# Patient Record
Sex: FEMALE | Race: WHITE | NOT HISPANIC OR LATINO | Employment: OTHER | URBAN - METROPOLITAN AREA
[De-identification: names, ages, dates, MRNs, and addresses within clinical notes are randomized per-mention and may not be internally consistent; named-entity substitution may affect disease eponyms.]

---

## 2017-03-30 ENCOUNTER — AMBULATORY - HEALTHEAST (OUTPATIENT)
Dept: LAB | Facility: CLINIC | Age: 42
End: 2017-03-30

## 2017-03-30 DIAGNOSIS — R53.83 FATIGUE: ICD-10-CM

## 2017-03-30 DIAGNOSIS — E55.9 VITAMIN D DEFICIENCY: ICD-10-CM

## 2017-03-30 DIAGNOSIS — D50.9 IRON DEFICIENCY ANEMIA: ICD-10-CM

## 2017-04-11 ENCOUNTER — OFFICE VISIT - HEALTHEAST (OUTPATIENT)
Dept: FAMILY MEDICINE | Facility: CLINIC | Age: 42
End: 2017-04-11

## 2017-04-11 DIAGNOSIS — R79.89 ELEVATED FERRITIN: ICD-10-CM

## 2017-04-11 DIAGNOSIS — R53.83 FATIGUE: ICD-10-CM

## 2017-04-11 DIAGNOSIS — F41.9 ANXIETY: ICD-10-CM

## 2017-04-18 ENCOUNTER — RECORDS - HEALTHEAST (OUTPATIENT)
Dept: ADMINISTRATIVE | Facility: OTHER | Age: 42
End: 2017-04-18

## 2017-04-19 ENCOUNTER — AMBULATORY - HEALTHEAST (OUTPATIENT)
Dept: FAMILY MEDICINE | Facility: CLINIC | Age: 42
End: 2017-04-19

## 2017-04-19 ENCOUNTER — COMMUNICATION - HEALTHEAST (OUTPATIENT)
Dept: FAMILY MEDICINE | Facility: CLINIC | Age: 42
End: 2017-04-19

## 2017-04-19 DIAGNOSIS — R63.4 WEIGHT LOSS: ICD-10-CM

## 2017-04-19 DIAGNOSIS — F41.9 ANXIETY: ICD-10-CM

## 2017-04-19 DIAGNOSIS — R19.7 DIARRHEA: ICD-10-CM

## 2017-04-28 ENCOUNTER — AMBULATORY - HEALTHEAST (OUTPATIENT)
Dept: LAB | Facility: CLINIC | Age: 42
End: 2017-04-28

## 2017-04-28 DIAGNOSIS — F41.9 ANXIETY: ICD-10-CM

## 2017-04-28 DIAGNOSIS — R74.01 ELEVATED ALT MEASUREMENT: ICD-10-CM

## 2017-04-28 DIAGNOSIS — R79.89 ELEVATED FERRITIN: ICD-10-CM

## 2017-04-28 DIAGNOSIS — R53.83 FATIGUE: ICD-10-CM

## 2017-05-03 ENCOUNTER — RECORDS - HEALTHEAST (OUTPATIENT)
Dept: ADMINISTRATIVE | Facility: OTHER | Age: 42
End: 2017-05-03

## 2017-05-18 ENCOUNTER — RECORDS - HEALTHEAST (OUTPATIENT)
Dept: ADMINISTRATIVE | Facility: OTHER | Age: 42
End: 2017-05-18

## 2017-05-19 ENCOUNTER — COMMUNICATION - HEALTHEAST (OUTPATIENT)
Dept: FAMILY MEDICINE | Facility: CLINIC | Age: 42
End: 2017-05-19

## 2017-06-13 ENCOUNTER — COMMUNICATION - HEALTHEAST (OUTPATIENT)
Dept: FAMILY MEDICINE | Facility: CLINIC | Age: 42
End: 2017-06-13

## 2017-06-13 DIAGNOSIS — F33.9 MAJOR DEPRESSION, RECURRENT (H): ICD-10-CM

## 2017-06-16 ENCOUNTER — COMMUNICATION - HEALTHEAST (OUTPATIENT)
Dept: FAMILY MEDICINE | Facility: CLINIC | Age: 42
End: 2017-06-16

## 2017-10-08 ENCOUNTER — HEALTH MAINTENANCE LETTER (OUTPATIENT)
Age: 42
End: 2017-10-08

## 2017-10-30 ENCOUNTER — COMMUNICATION - HEALTHEAST (OUTPATIENT)
Dept: FAMILY MEDICINE | Facility: CLINIC | Age: 42
End: 2017-10-30

## 2017-10-30 DIAGNOSIS — R53.83 FATIGUE: ICD-10-CM

## 2017-10-31 ENCOUNTER — HOSPITAL ENCOUNTER (OUTPATIENT)
Dept: MAMMOGRAPHY | Facility: HOSPITAL | Age: 42
Discharge: HOME | End: 2017-10-31
Attending: FAMILY MEDICINE

## 2017-10-31 DIAGNOSIS — Z12.31 VISIT FOR SCREENING MAMMOGRAM: ICD-10-CM

## 2017-11-02 ENCOUNTER — AMBULATORY - HEALTHEAST (OUTPATIENT)
Dept: LAB | Facility: CLINIC | Age: 42
End: 2017-11-02

## 2017-11-02 ENCOUNTER — COMMUNICATION - HEALTHEAST (OUTPATIENT)
Dept: FAMILY MEDICINE | Facility: CLINIC | Age: 42
End: 2017-11-02

## 2017-11-02 DIAGNOSIS — R53.83 FATIGUE: ICD-10-CM

## 2017-11-05 ENCOUNTER — COMMUNICATION - HEALTHEAST (OUTPATIENT)
Dept: FAMILY MEDICINE | Facility: CLINIC | Age: 42
End: 2017-11-05

## 2017-11-05 DIAGNOSIS — E55.9 VITAMIN D DEFICIENCY: ICD-10-CM

## 2018-01-08 ENCOUNTER — COMMUNICATION - HEALTHEAST (OUTPATIENT)
Dept: FAMILY MEDICINE | Facility: CLINIC | Age: 43
End: 2018-01-08

## 2018-01-14 ENCOUNTER — COMMUNICATION - HEALTHEAST (OUTPATIENT)
Dept: FAMILY MEDICINE | Facility: CLINIC | Age: 43
End: 2018-01-14

## 2018-01-19 ENCOUNTER — COMMUNICATION - HEALTHEAST (OUTPATIENT)
Dept: FAMILY MEDICINE | Facility: CLINIC | Age: 43
End: 2018-01-19

## 2018-01-24 ENCOUNTER — RECORDS - HEALTHEAST (OUTPATIENT)
Dept: ADMINISTRATIVE | Facility: OTHER | Age: 43
End: 2018-01-24

## 2018-01-30 ENCOUNTER — OFFICE VISIT - HEALTHEAST (OUTPATIENT)
Dept: FAMILY MEDICINE | Facility: CLINIC | Age: 43
End: 2018-01-30

## 2018-01-30 DIAGNOSIS — D64.9 ANEMIA: ICD-10-CM

## 2018-01-30 DIAGNOSIS — E55.9 VITAMIN D DEFICIENCY: ICD-10-CM

## 2018-01-30 DIAGNOSIS — Z00.00 HEALTHCARE MAINTENANCE: ICD-10-CM

## 2018-01-30 LAB
ALBUMIN SERPL-MCNC: 3.5 G/DL (ref 3.5–5)
ALP SERPL-CCNC: 72 U/L (ref 45–120)
ALT SERPL W P-5'-P-CCNC: 38 U/L (ref 0–45)
ANION GAP SERPL CALCULATED.3IONS-SCNC: 10 MMOL/L (ref 5–18)
AST SERPL W P-5'-P-CCNC: 32 U/L (ref 0–40)
BASOPHILS # BLD AUTO: 0 THOU/UL (ref 0–0.2)
BASOPHILS NFR BLD AUTO: 1 % (ref 0–2)
BILIRUB SERPL-MCNC: 0.6 MG/DL (ref 0–1)
BUN SERPL-MCNC: 15 MG/DL (ref 8–22)
CALCIUM SERPL-MCNC: 9.2 MG/DL (ref 8.5–10.5)
CHLORIDE BLD-SCNC: 105 MMOL/L (ref 98–107)
CHOLEST SERPL-MCNC: 239 MG/DL
CO2 SERPL-SCNC: 22 MMOL/L (ref 22–31)
CREAT SERPL-MCNC: 0.69 MG/DL (ref 0.6–1.1)
EOSINOPHIL # BLD AUTO: 0.2 THOU/UL (ref 0–0.4)
EOSINOPHIL NFR BLD AUTO: 3 % (ref 0–6)
ERYTHROCYTE [DISTWIDTH] IN BLOOD BY AUTOMATED COUNT: 12.5 % (ref 11–14.5)
FASTING STATUS PATIENT QL REPORTED: YES
FERRITIN SERPL-MCNC: 139 NG/ML (ref 10–130)
GFR SERPL CREATININE-BSD FRML MDRD: >60 ML/MIN/1.73M2
GLUCOSE BLD-MCNC: 84 MG/DL (ref 70–125)
HCT VFR BLD AUTO: 38.3 % (ref 35–47)
HDLC SERPL-MCNC: 67 MG/DL
HGB BLD-MCNC: 12.5 G/DL (ref 12–16)
LDLC SERPL CALC-MCNC: 141 MG/DL
LYMPHOCYTES # BLD AUTO: 1.9 THOU/UL (ref 0.8–4.4)
LYMPHOCYTES NFR BLD AUTO: 29 % (ref 20–40)
MCH RBC QN AUTO: 27.3 PG (ref 27–34)
MCHC RBC AUTO-ENTMCNC: 32.6 G/DL (ref 32–36)
MCV RBC AUTO: 84 FL (ref 80–100)
MONOCYTES # BLD AUTO: 0.6 THOU/UL (ref 0–0.9)
MONOCYTES NFR BLD AUTO: 10 % (ref 2–10)
NEUTROPHILS # BLD AUTO: 3.8 THOU/UL (ref 2–7.7)
NEUTROPHILS NFR BLD AUTO: 58 % (ref 50–70)
PLATELET # BLD AUTO: 205 THOU/UL (ref 140–440)
PMV BLD AUTO: 8.1 FL (ref 7–10)
POTASSIUM BLD-SCNC: 4.3 MMOL/L (ref 3.5–5)
PROT SERPL-MCNC: 6.9 G/DL (ref 6–8)
RBC # BLD AUTO: 4.57 MILL/UL (ref 3.8–5.4)
SODIUM SERPL-SCNC: 137 MMOL/L (ref 136–145)
TRIGL SERPL-MCNC: 153 MG/DL
WBC: 6.5 THOU/UL (ref 4–11)

## 2018-01-30 ASSESSMENT — MIFFLIN-ST. JEOR: SCORE: 1268.19

## 2018-01-31 LAB
25(OH)D3 SERPL-MCNC: 34.5 NG/ML (ref 30–80)
25(OH)D3 SERPL-MCNC: 34.5 NG/ML (ref 30–80)
HPV SOURCE: NORMAL
HUMAN PAPILLOMA VIRUS 16 DNA: NEGATIVE
HUMAN PAPILLOMA VIRUS 18 DNA: NEGATIVE
HUMAN PAPILLOMA VIRUS FINAL DIAGNOSIS: NORMAL
HUMAN PAPILLOMA VIRUS OTHER HR: NEGATIVE
SPECIMEN DESCRIPTION: NORMAL

## 2018-02-05 ENCOUNTER — AMBULATORY - HEALTHEAST (OUTPATIENT)
Dept: FAMILY MEDICINE | Facility: CLINIC | Age: 43
End: 2018-02-05

## 2018-02-05 DIAGNOSIS — E55.9 VITAMIN D DEFICIENCY: ICD-10-CM

## 2018-02-05 DIAGNOSIS — E03.9 HYPOTHYROIDISM: ICD-10-CM

## 2018-03-21 ENCOUNTER — AMBULATORY - HEALTHEAST (OUTPATIENT)
Dept: LAB | Facility: CLINIC | Age: 43
End: 2018-03-21

## 2018-03-21 ENCOUNTER — AMBULATORY - HEALTHEAST (OUTPATIENT)
Dept: FAMILY MEDICINE | Facility: CLINIC | Age: 43
End: 2018-03-21

## 2018-03-21 DIAGNOSIS — E55.9 VITAMIN D DEFICIENCY: ICD-10-CM

## 2018-03-21 DIAGNOSIS — R53.83 FATIGUE: ICD-10-CM

## 2018-03-21 DIAGNOSIS — E03.9 HYPOTHYROIDISM: ICD-10-CM

## 2018-03-21 LAB
ERYTHROCYTE [DISTWIDTH] IN BLOOD BY AUTOMATED COUNT: 12.8 % (ref 11–14.5)
HCT VFR BLD AUTO: 38.9 % (ref 35–47)
HGB BLD-MCNC: 12.9 G/DL (ref 12–16)
MCH RBC QN AUTO: 27.9 PG (ref 27–34)
MCHC RBC AUTO-ENTMCNC: 33.1 G/DL (ref 32–36)
MCV RBC AUTO: 84 FL (ref 80–100)
PLATELET # BLD AUTO: 229 THOU/UL (ref 140–440)
PMV BLD AUTO: 8.8 FL (ref 7–10)
RBC # BLD AUTO: 4.62 MILL/UL (ref 3.8–5.4)
TSH SERPL DL<=0.005 MIU/L-ACNC: 1.85 UIU/ML (ref 0.3–5)
WBC: 7.4 THOU/UL (ref 4–11)

## 2018-03-22 LAB
25(OH)D3 SERPL-MCNC: 47.6 NG/ML (ref 30–80)
25(OH)D3 SERPL-MCNC: 47.6 NG/ML (ref 30–80)

## 2018-04-12 ENCOUNTER — OFFICE VISIT - HEALTHEAST (OUTPATIENT)
Dept: FAMILY MEDICINE | Facility: CLINIC | Age: 43
End: 2018-04-12

## 2018-04-12 DIAGNOSIS — Z79.899 HIGH RISK MEDICATION USE: ICD-10-CM

## 2018-04-18 ENCOUNTER — COMMUNICATION - HEALTHEAST (OUTPATIENT)
Dept: FAMILY MEDICINE | Facility: CLINIC | Age: 43
End: 2018-04-18

## 2018-04-19 ENCOUNTER — COMMUNICATION - HEALTHEAST (OUTPATIENT)
Dept: FAMILY MEDICINE | Facility: CLINIC | Age: 43
End: 2018-04-19

## 2018-04-27 ENCOUNTER — COMMUNICATION - HEALTHEAST (OUTPATIENT)
Dept: FAMILY MEDICINE | Facility: CLINIC | Age: 43
End: 2018-04-27

## 2018-04-28 ENCOUNTER — AMBULATORY - HEALTHEAST (OUTPATIENT)
Dept: FAMILY MEDICINE | Facility: CLINIC | Age: 43
End: 2018-04-28

## 2018-04-30 ENCOUNTER — AMBULATORY - HEALTHEAST (OUTPATIENT)
Dept: FAMILY MEDICINE | Facility: CLINIC | Age: 43
End: 2018-04-30

## 2018-05-02 ENCOUNTER — AMBULATORY - HEALTHEAST (OUTPATIENT)
Dept: FAMILY MEDICINE | Facility: CLINIC | Age: 43
End: 2018-05-02

## 2018-05-02 ENCOUNTER — AMBULATORY - HEALTHEAST (OUTPATIENT)
Dept: LAB | Facility: CLINIC | Age: 43
End: 2018-05-02

## 2018-05-02 DIAGNOSIS — R53.83 FATIGUE: ICD-10-CM

## 2018-05-02 DIAGNOSIS — E55.9 VITAMIN D DEFICIENCY: ICD-10-CM

## 2018-05-03 LAB
25(OH)D3 SERPL-MCNC: 34.3 NG/ML (ref 30–80)
25(OH)D3 SERPL-MCNC: 34.3 NG/ML (ref 30–80)
TSH SERPL DL<=0.005 MIU/L-ACNC: 2.01 UIU/ML (ref 0.3–5)

## 2018-05-14 ENCOUNTER — COMMUNICATION - HEALTHEAST (OUTPATIENT)
Dept: FAMILY MEDICINE | Facility: CLINIC | Age: 43
End: 2018-05-14

## 2018-06-10 ENCOUNTER — COMMUNICATION - HEALTHEAST (OUTPATIENT)
Dept: FAMILY MEDICINE | Facility: CLINIC | Age: 43
End: 2018-06-10

## 2018-06-11 ENCOUNTER — COMMUNICATION - HEALTHEAST (OUTPATIENT)
Dept: FAMILY MEDICINE | Facility: CLINIC | Age: 43
End: 2018-06-11

## 2018-06-11 ENCOUNTER — AMBULATORY - HEALTHEAST (OUTPATIENT)
Dept: FAMILY MEDICINE | Facility: CLINIC | Age: 43
End: 2018-06-11

## 2018-07-10 ENCOUNTER — AMBULATORY - HEALTHEAST (OUTPATIENT)
Dept: FAMILY MEDICINE | Facility: CLINIC | Age: 43
End: 2018-07-10

## 2018-07-12 ENCOUNTER — OFFICE VISIT - HEALTHEAST (OUTPATIENT)
Dept: FAMILY MEDICINE | Facility: CLINIC | Age: 43
End: 2018-07-12

## 2018-07-12 DIAGNOSIS — E55.9 VITAMIN D DEFICIENCY: ICD-10-CM

## 2018-07-12 DIAGNOSIS — E03.8 OTHER SPECIFIED HYPOTHYROIDISM: ICD-10-CM

## 2018-07-12 DIAGNOSIS — Z86.0100 HISTORY OF COLONIC POLYPS: ICD-10-CM

## 2018-07-12 LAB — TSH SERPL DL<=0.005 MIU/L-ACNC: 1.8 UIU/ML (ref 0.3–5)

## 2018-07-13 LAB
25(OH)D3 SERPL-MCNC: 33.7 NG/ML (ref 30–80)
25(OH)D3 SERPL-MCNC: 33.7 NG/ML (ref 30–80)

## 2018-09-06 ENCOUNTER — COMMUNICATION - HEALTHEAST (OUTPATIENT)
Dept: FAMILY MEDICINE | Facility: CLINIC | Age: 43
End: 2018-09-06

## 2018-09-06 DIAGNOSIS — E03.9 HYPOTHYROIDISM: ICD-10-CM

## 2018-09-11 ENCOUNTER — COMMUNICATION - HEALTHEAST (OUTPATIENT)
Dept: FAMILY MEDICINE | Facility: CLINIC | Age: 43
End: 2018-09-11

## 2018-10-16 ENCOUNTER — COMMUNICATION - HEALTHEAST (OUTPATIENT)
Dept: FAMILY MEDICINE | Facility: CLINIC | Age: 43
End: 2018-10-16

## 2018-11-02 ENCOUNTER — HOSPITAL ENCOUNTER (OUTPATIENT)
Dept: MAMMOGRAPHY | Facility: CLINIC | Age: 43
Discharge: HOME | End: 2018-11-02
Attending: FAMILY MEDICINE

## 2018-11-02 DIAGNOSIS — Z12.31 VISIT FOR SCREENING MAMMOGRAM: ICD-10-CM

## 2018-11-21 ENCOUNTER — OFFICE VISIT - HEALTHEAST (OUTPATIENT)
Dept: FAMILY MEDICINE | Facility: CLINIC | Age: 43
End: 2018-11-21

## 2018-11-21 DIAGNOSIS — E03.8 OTHER SPECIFIED HYPOTHYROIDISM: ICD-10-CM

## 2018-11-21 DIAGNOSIS — E55.9 VITAMIN D DEFICIENCY: ICD-10-CM

## 2018-11-21 DIAGNOSIS — G43.709 CHRONIC MIGRAINE WITHOUT AURA WITHOUT STATUS MIGRAINOSUS, NOT INTRACTABLE: ICD-10-CM

## 2018-11-21 DIAGNOSIS — Z79.899 HIGH RISK MEDICATION USE: ICD-10-CM

## 2018-11-21 DIAGNOSIS — N92.6 ABNORMAL MENSES: ICD-10-CM

## 2018-11-21 DIAGNOSIS — Z82.49 FAMILY HISTORY OF ANEURYSM: ICD-10-CM

## 2018-11-21 LAB
ERYTHROCYTE [DISTWIDTH] IN BLOOD BY AUTOMATED COUNT: 13 % (ref 11–14.5)
HCT VFR BLD AUTO: 34.8 % (ref 35–47)
HGB BLD-MCNC: 11.7 G/DL (ref 12–16)
MCH RBC QN AUTO: 27.6 PG (ref 27–34)
MCHC RBC AUTO-ENTMCNC: 33.7 G/DL (ref 32–36)
MCV RBC AUTO: 82 FL (ref 80–100)
PLATELET # BLD AUTO: 205 THOU/UL (ref 140–440)
PMV BLD AUTO: 8.3 FL (ref 7–10)
RBC # BLD AUTO: 4.24 MILL/UL (ref 3.8–5.4)
WBC: 6 THOU/UL (ref 4–11)

## 2018-11-23 LAB
FERRITIN SERPL-MCNC: 91 NG/ML (ref 10–130)
IRON SATN MFR SERPL: 11 % (ref 20–50)
IRON SERPL-MCNC: 37 UG/DL (ref 42–175)
PROLACTIN SERPL-MCNC: 13.7 NG/ML (ref 0–20)
TIBC SERPL-MCNC: 344 UG/DL (ref 313–563)
TRANSFERRIN SERPL-MCNC: 275 MG/DL (ref 212–360)
TSH SERPL DL<=0.005 MIU/L-ACNC: 2.17 UIU/ML (ref 0.3–5)

## 2018-11-26 ENCOUNTER — AMBULATORY - HEALTHEAST (OUTPATIENT)
Dept: FAMILY MEDICINE | Facility: CLINIC | Age: 43
End: 2018-11-26

## 2018-11-26 LAB
25(OH)D3 SERPL-MCNC: 62.3 NG/ML (ref 30–80)
25(OH)D3 SERPL-MCNC: 62.3 NG/ML (ref 30–80)

## 2018-11-30 ENCOUNTER — COMMUNICATION - HEALTHEAST (OUTPATIENT)
Dept: FAMILY MEDICINE | Facility: CLINIC | Age: 43
End: 2018-11-30

## 2018-11-30 DIAGNOSIS — I67.1 BRAIN ANEURYSM: ICD-10-CM

## 2018-12-02 ENCOUNTER — COMMUNICATION - HEALTHEAST (OUTPATIENT)
Dept: FAMILY MEDICINE | Facility: CLINIC | Age: 43
End: 2018-12-02

## 2018-12-03 ENCOUNTER — COMMUNICATION - HEALTHEAST (OUTPATIENT)
Dept: FAMILY MEDICINE | Facility: CLINIC | Age: 43
End: 2018-12-03

## 2018-12-11 ENCOUNTER — RECORDS - HEALTHEAST (OUTPATIENT)
Dept: ADMINISTRATIVE | Facility: OTHER | Age: 43
End: 2018-12-11

## 2018-12-12 ENCOUNTER — RECORDS - HEALTHEAST (OUTPATIENT)
Dept: ADMINISTRATIVE | Facility: OTHER | Age: 43
End: 2018-12-12

## 2019-01-02 ENCOUNTER — RECORDS - HEALTHEAST (OUTPATIENT)
Dept: ADMINISTRATIVE | Facility: OTHER | Age: 44
End: 2019-01-02

## 2019-01-08 ENCOUNTER — OFFICE VISIT - HEALTHEAST (OUTPATIENT)
Dept: NEUROSURGERY | Facility: CLINIC | Age: 44
End: 2019-01-08

## 2019-01-08 DIAGNOSIS — I67.1 NONRUPTURED CEREBRAL ANEURYSM: ICD-10-CM

## 2019-01-25 ENCOUNTER — COMMUNICATION - HEALTHEAST (OUTPATIENT)
Dept: FAMILY MEDICINE | Facility: CLINIC | Age: 44
End: 2019-01-25

## 2019-03-09 ENCOUNTER — COMMUNICATION - HEALTHEAST (OUTPATIENT)
Dept: FAMILY MEDICINE | Facility: CLINIC | Age: 44
End: 2019-03-09

## 2019-03-14 ENCOUNTER — COMMUNICATION - HEALTHEAST (OUTPATIENT)
Dept: FAMILY MEDICINE | Facility: CLINIC | Age: 44
End: 2019-03-14

## 2019-03-25 ENCOUNTER — AMBULATORY - HEALTHEAST (OUTPATIENT)
Dept: FAMILY MEDICINE | Facility: CLINIC | Age: 44
End: 2019-03-25

## 2019-03-25 DIAGNOSIS — D50.0 IRON DEFICIENCY ANEMIA DUE TO CHRONIC BLOOD LOSS: ICD-10-CM

## 2019-03-27 ENCOUNTER — COMMUNICATION - HEALTHEAST (OUTPATIENT)
Dept: FAMILY MEDICINE | Facility: CLINIC | Age: 44
End: 2019-03-27

## 2019-03-27 DIAGNOSIS — D50.0 IRON DEFICIENCY ANEMIA DUE TO CHRONIC BLOOD LOSS: ICD-10-CM

## 2019-03-27 DIAGNOSIS — N93.9 ABNORMAL VAGINAL BLEEDING: ICD-10-CM

## 2019-04-01 ENCOUNTER — HOSPITAL ENCOUNTER (OUTPATIENT)
Dept: ULTRASOUND IMAGING | Facility: HOSPITAL | Age: 44
Discharge: HOME | End: 2019-04-01
Attending: FAMILY MEDICINE

## 2019-04-01 DIAGNOSIS — D50.0 IRON DEFICIENCY ANEMIA DUE TO CHRONIC BLOOD LOSS: ICD-10-CM

## 2019-04-08 ENCOUNTER — AMBULATORY - HEALTHEAST (OUTPATIENT)
Dept: LAB | Facility: CLINIC | Age: 44
End: 2019-04-08

## 2019-04-08 DIAGNOSIS — D50.0 IRON DEFICIENCY ANEMIA DUE TO CHRONIC BLOOD LOSS: ICD-10-CM

## 2019-04-08 LAB
ERYTHROCYTE [DISTWIDTH] IN BLOOD BY AUTOMATED COUNT: 12 % (ref 11–14.5)
HCT VFR BLD AUTO: 36.7 % (ref 35–47)
HGB BLD-MCNC: 12.3 G/DL (ref 12–16)
IRON SATN MFR SERPL: 15 % (ref 20–50)
IRON SERPL-MCNC: 56 UG/DL (ref 42–175)
MCH RBC QN AUTO: 28 PG (ref 27–34)
MCHC RBC AUTO-ENTMCNC: 33.4 G/DL (ref 32–36)
MCV RBC AUTO: 84 FL (ref 80–100)
PLATELET # BLD AUTO: 211 THOU/UL (ref 140–440)
PMV BLD AUTO: 7.9 FL (ref 7–10)
RBC # BLD AUTO: 4.37 MILL/UL (ref 3.8–5.4)
TIBC SERPL-MCNC: 369 UG/DL (ref 313–563)
TRANSFERRIN SERPL-MCNC: 295 MG/DL (ref 212–360)
WBC: 8.3 THOU/UL (ref 4–11)

## 2019-04-10 ENCOUNTER — RECORDS - HEALTHEAST (OUTPATIENT)
Dept: ADMINISTRATIVE | Facility: OTHER | Age: 44
End: 2019-04-10

## 2019-04-18 ENCOUNTER — COMMUNICATION - HEALTHEAST (OUTPATIENT)
Dept: NEUROSURGERY | Facility: CLINIC | Age: 44
End: 2019-04-18

## 2019-05-07 ENCOUNTER — COMMUNICATION - HEALTHEAST (OUTPATIENT)
Dept: NEUROSURGERY | Facility: CLINIC | Age: 44
End: 2019-05-07

## 2019-05-15 ENCOUNTER — OFFICE VISIT - HEALTHEAST (OUTPATIENT)
Dept: FAMILY MEDICINE | Facility: CLINIC | Age: 44
End: 2019-05-15

## 2019-05-15 DIAGNOSIS — Z01.818 PREOP EXAMINATION: ICD-10-CM

## 2019-05-15 DIAGNOSIS — I67.1 BRAIN ANEURYSM: ICD-10-CM

## 2019-05-15 LAB
HCG UR QL: NEGATIVE
HGB BLD-MCNC: 11.5 G/DL (ref 12–16)

## 2019-05-15 ASSESSMENT — MIFFLIN-ST. JEOR: SCORE: 1277.75

## 2019-05-20 ENCOUNTER — HOSPITAL ENCOUNTER (OUTPATIENT)
Dept: INTERVENTIONAL RADIOLOGY/VASCULAR | Facility: CLINIC | Age: 44
Discharge: HOME | End: 2019-05-20
Attending: NEUROLOGICAL SURGERY | Admitting: RADIOLOGY

## 2019-05-20 DIAGNOSIS — I67.1 NONRUPTURED CEREBRAL ANEURYSM: ICD-10-CM

## 2019-05-20 LAB
CREAT SERPL-MCNC: 0.75 MG/DL (ref 0.6–1.1)
GFR SERPL CREATININE-BSD FRML MDRD: >60 ML/MIN/1.73M2
PLATELET # BLD AUTO: 209 THOU/UL (ref 140–440)

## 2019-05-20 ASSESSMENT — MIFFLIN-ST. JEOR: SCORE: 1272.72

## 2019-05-22 ENCOUNTER — COMMUNICATION - HEALTHEAST (OUTPATIENT)
Dept: NEUROSURGERY | Facility: CLINIC | Age: 44
End: 2019-05-22

## 2019-06-25 ENCOUNTER — OFFICE VISIT - HEALTHEAST (OUTPATIENT)
Dept: NEUROSURGERY | Facility: CLINIC | Age: 44
End: 2019-06-25

## 2019-06-25 DIAGNOSIS — I67.1 NONRUPTURED CEREBRAL ANEURYSM: ICD-10-CM

## 2019-07-31 ENCOUNTER — APPOINTMENT (OUTPATIENT)
Age: 44
Setting detail: DERMATOLOGY
End: 2019-07-31

## 2019-07-31 DIAGNOSIS — Z41.9 ENCOUNTER FOR PROCEDURE FOR PURPOSES OTHER THAN REMEDYING HEALTH STATE, UNSPECIFIED: ICD-10-CM

## 2019-07-31 PROCEDURE — OTHER JUVEDERM ULTRA XC INJECTION: OTHER

## 2019-07-31 NOTE — PROCEDURE: JUVEDERM ULTRA XC INJECTION
Post-Care Instructions: -Ice provided post treatment for 2 to 5 minutes and or to take home. Patient instructed to apply ice 20 minutes on, 20 minutes off while awake for one to 1 to 2 days to reduce swelling. Avoid massage in the area. An over-the-counter antihistamine may be beneficial for the first 72 hours and was recommended to help with swelling.  Patient instructed to notify clinic if any bruising worsens, travels or becomes painful.

## 2019-07-31 NOTE — PROCEDURE: JUVEDERM ULTRA XC INJECTION
Price (Use Numbers Only, No Special Characters Or $): 897 Price (Use Numbers Only, No Special Characters Or $): 851

## 2019-07-31 NOTE — PROCEDURE: JUVEDERM ULTRA XC INJECTION
Consent: -Assessment \"Marli Aesthetics Scales\":\\nWrinkles: mild, Lines present at rest: none to mild, Folds: mild, Volume loss/skin laxity: mild.\\n-Treatment areas reviewed with patient holding a hand held mirror. The patient has realistic expectations.  \\n-Written consent obtained.  Questions answered. Patient verbalized understanding of the content.  See written informed consent on file. \\n-Topical anesthesia was achieved with 23% lidocaine, 7% tetracaine for 30 minutes.\\n-Topical anesthesia(lot#[id number]@3 exp. 12/27/19) was removed. \\n-Listerine mouth rinse provided.\\n-The skin was prepped with with alcohol and chlorhexadine prep. \\n-Aspetic technique maintained throughout procedure.\\n-The filler was administered to the treatment areas noted above. \\n-A 31g BD insuline syringe was back filled with JuveDerm Ultra to deliver micro-droplet technique where necessary.\\n-The patient tolerated the procedure well w/o incident. \\n-Vaseline applied to lips\\n-Additional product (hyaluronic ) may be necessary for optimal correction following treatment and/or maintenance. Consent: -Assessment \"Marli Aesthetics Scales\":\\nWrinkles: mild, Lines present at rest: none to mild, Folds: mild, Volume loss/skin laxity: mild.\\n-Treatment areas reviewed with patient holding a hand held mirror. The patient has realistic expectations.  \\n-Written consent obtained.  Questions answered. Patient verbalized understanding of the content.  See written informed consent on file. \\n-Topical anesthesia was achieved with 23% lidocaine, 7% tetracaine for 30 minutes.\\n-Topical anesthesia(lot#[id number]@3 exp. 12/27/19) was removed. \\n-Listerine mouth rinse provided.\\n-The skin was prepped with with alcohol and chlorhexadine prep. \\n-Aspetic technique maintained throughout procedure.\\n-The filler was administered to the treatment areas noted above. \\n-A 31g BD insuline syringe was back filled with JuveDerm Ultra to deliver micro-droplet technique where necessary.\\n-The patient tolerated the procedure well w/o incident. \\n-Vaseline applied to lips\\n-Additional product (hyaluronic ) may be necessary for optimal correction following treatment and/or maintenance.

## 2019-08-21 ENCOUNTER — APPOINTMENT (OUTPATIENT)
Age: 44
Setting detail: DERMATOLOGY
End: 2019-08-23

## 2019-08-21 DIAGNOSIS — Z41.9 ENCOUNTER FOR PROCEDURE FOR PURPOSES OTHER THAN REMEDYING HEALTH STATE, UNSPECIFIED: ICD-10-CM

## 2019-08-21 PROCEDURE — OTHER JUVEDERM ULTRA XC INJECTION: OTHER

## 2019-08-21 NOTE — PROCEDURE: JUVEDERM ULTRA XC INJECTION
Consent: -Assessment \"Marli Aesthetics Scales\":\\nWrinkles: mild, Lines present at rest: none to mild, Folds: mild, Volume loss/skin laxity: mild.\\n-Treatment areas reviewed with patient holding a hand held mirror. The patient has realistic expectations.  \\n-Written consent obtained.  Questions answered. Patient verbalized understanding of the content.  See written informed consent on file. \\n-Topical anesthesia was achieved with 23% lidocaine, 7% tetracaine for 30 minutes.\\n-Topical anesthesia(lot#[id number]@3 exp. 12/27/19) was removed. \\n-Listerine mouth rinse provided.\\n-The skin was prepped with with alcohol and chlorhexadine prep. \\n-Aspetic technique maintained throughout procedure.\\n-The filler was administered to the treatment areas noted above. \\n-A 31g BD insuline syringe was back filled with JuveDerm Ultra to deliver micro-droplet technique where necessary.\\n-The patient tolerated the procedure well w/o incident. \\n-Vaseline applied to lips\\n-Additional product (hyaluronic ) may be necessary for optimal correction following treatment and/or maintenance. Consent: -Assessment \"Marli Aesthetics Scales\":\\nWrinkles: mild, Lines present at rest: none to mild, Folds: mild, Volume loss/skin laxity: mild.\\n-Treatment areas reviewed with patient holding a hand held mirror. The patient has realistic expectations.  \\n-Written consent obtained.  Questions answered. Patient verbalized understanding of the content.  See written informed consent on file. \\n-Topical anesthesia was achieved with 23% lidocaine, 7% tetracaine for 30 minutes.\\n-Topical anesthesia(lot#[id number]@3 exp. 12/27/19) was removed. \\n-Listerine mouth rinse provided.\\n-The skin was prepped with with alcohol and chlorhexadine prep. \\n-Aspetic technique maintained throughout procedure.\\n-The filler was administered to the treatment areas noted above. \\n-A 31g BD insuline syringe was back filled with JuveDerm Ultra to deliver micro-droplet technique where necessary.\\n-The patient tolerated the procedure well w/o incident. \\n-Vaseline applied to lips\\n-Additional product (hyaluronic ) may be necessary for optimal correction following treatment and/or maintenance.

## 2019-08-21 NOTE — HPI: COSMETIC FOLLOW UP
How Did You Tolerate The Procedure?: well, without problems
What Condition Are We Treating?: treatment 7/31/19
What Procedure Did We Perform At The Last Visit?: JuveDerm Ultra lips and OCs

## 2019-09-09 ENCOUNTER — RECORDS - HEALTHEAST (OUTPATIENT)
Dept: LAB | Facility: HOSPITAL | Age: 44
End: 2019-09-09

## 2019-09-09 ENCOUNTER — RECORDS - HEALTHEAST (OUTPATIENT)
Dept: ADMINISTRATIVE | Facility: OTHER | Age: 44
End: 2019-09-09

## 2019-09-09 LAB — FERRITIN SERPL-MCNC: 67 NG/ML (ref 10–130)

## 2019-10-08 ENCOUNTER — AMBULATORY - HEALTHEAST (OUTPATIENT)
Dept: NURSING | Facility: CLINIC | Age: 44
End: 2019-10-08

## 2019-11-05 ENCOUNTER — RECORDS - HEALTHEAST (OUTPATIENT)
Dept: ADMINISTRATIVE | Facility: OTHER | Age: 44
End: 2019-11-05

## 2019-11-06 ENCOUNTER — COMMUNICATION - HEALTHEAST (OUTPATIENT)
Dept: FAMILY MEDICINE | Facility: CLINIC | Age: 44
End: 2019-11-06

## 2019-11-06 DIAGNOSIS — Z30.011 INITIATION OF OCP (BCP): ICD-10-CM

## 2019-11-12 ENCOUNTER — RECORDS - HEALTHEAST (OUTPATIENT)
Dept: MAMMOGRAPHY | Facility: CLINIC | Age: 44
End: 2019-11-12

## 2019-11-12 DIAGNOSIS — Z12.31 ENCOUNTER FOR SCREENING MAMMOGRAM FOR MALIGNANT NEOPLASM OF BREAST: ICD-10-CM

## 2019-12-27 ENCOUNTER — RECORDS - HEALTHEAST (OUTPATIENT)
Dept: ADMINISTRATIVE | Facility: OTHER | Age: 44
End: 2019-12-27

## 2019-12-31 ENCOUNTER — RECORDS - HEALTHEAST (OUTPATIENT)
Dept: ADMINISTRATIVE | Facility: OTHER | Age: 44
End: 2019-12-31

## 2020-02-23 ENCOUNTER — HEALTH MAINTENANCE LETTER (OUTPATIENT)
Age: 45
End: 2020-02-23

## 2020-02-27 ENCOUNTER — OFFICE VISIT - HEALTHEAST (OUTPATIENT)
Dept: FAMILY MEDICINE | Facility: CLINIC | Age: 45
End: 2020-02-27

## 2020-02-27 DIAGNOSIS — E55.9 VITAMIN D DEFICIENCY: ICD-10-CM

## 2020-02-27 DIAGNOSIS — Z00.00 ROUTINE HISTORY AND PHYSICAL EXAMINATION OF ADULT: ICD-10-CM

## 2020-02-27 DIAGNOSIS — Z86.0100 HISTORY OF COLONIC POLYPS: ICD-10-CM

## 2020-02-27 DIAGNOSIS — D50.8 OTHER IRON DEFICIENCY ANEMIA: ICD-10-CM

## 2020-02-27 DIAGNOSIS — Z30.011 INITIATION OF OCP (BCP): ICD-10-CM

## 2020-02-27 DIAGNOSIS — E03.9 HYPOTHYROIDISM, UNSPECIFIED TYPE: ICD-10-CM

## 2020-02-27 DIAGNOSIS — Z00.00 ROUTINE GENERAL MEDICAL EXAMINATION AT A HEALTH CARE FACILITY: ICD-10-CM

## 2020-02-27 LAB
ALBUMIN SERPL-MCNC: 3.6 G/DL (ref 3.5–5)
ALP SERPL-CCNC: 72 U/L (ref 45–120)
ALT SERPL W P-5'-P-CCNC: 13 U/L (ref 0–45)
ANION GAP SERPL CALCULATED.3IONS-SCNC: 11 MMOL/L (ref 5–18)
AST SERPL W P-5'-P-CCNC: 17 U/L (ref 0–40)
BILIRUB SERPL-MCNC: 0.4 MG/DL (ref 0–1)
BUN SERPL-MCNC: 10 MG/DL (ref 8–22)
CALCIUM SERPL-MCNC: 9.3 MG/DL (ref 8.5–10.5)
CHLORIDE BLD-SCNC: 104 MMOL/L (ref 98–107)
CHOLEST SERPL-MCNC: 213 MG/DL
CO2 SERPL-SCNC: 22 MMOL/L (ref 22–31)
CREAT SERPL-MCNC: 0.72 MG/DL (ref 0.6–1.1)
ERYTHROCYTE [DISTWIDTH] IN BLOOD BY AUTOMATED COUNT: 12.8 % (ref 11–14.5)
FASTING STATUS PATIENT QL REPORTED: YES
FERRITIN SERPL-MCNC: 48 NG/ML (ref 10–130)
GFR SERPL CREATININE-BSD FRML MDRD: >60 ML/MIN/1.73M2
GLUCOSE BLD-MCNC: 77 MG/DL (ref 70–125)
HBV SURFACE AB SERPL IA-ACNC: NEGATIVE M[IU]/ML
HCT VFR BLD AUTO: 37.1 % (ref 35–47)
HDLC SERPL-MCNC: 69 MG/DL
HGB BLD-MCNC: 12.4 G/DL (ref 12–16)
IRON SATN MFR SERPL: 16 % (ref 20–50)
IRON SERPL-MCNC: 61 UG/DL (ref 42–175)
LDLC SERPL CALC-MCNC: 106 MG/DL
MCH RBC QN AUTO: 27.3 PG (ref 27–34)
MCHC RBC AUTO-ENTMCNC: 33.4 G/DL (ref 32–36)
MCV RBC AUTO: 82 FL (ref 80–100)
PLATELET # BLD AUTO: 228 THOU/UL (ref 140–440)
PMV BLD AUTO: 8.1 FL (ref 7–10)
POTASSIUM BLD-SCNC: 4.6 MMOL/L (ref 3.5–5)
PROT SERPL-MCNC: 7 G/DL (ref 6–8)
RBC # BLD AUTO: 4.54 MILL/UL (ref 3.8–5.4)
SODIUM SERPL-SCNC: 137 MMOL/L (ref 136–145)
TIBC SERPL-MCNC: 388 UG/DL (ref 313–563)
TRANSFERRIN SERPL-MCNC: 310 MG/DL (ref 212–360)
TRIGL SERPL-MCNC: 188 MG/DL
TSH SERPL DL<=0.005 MIU/L-ACNC: 2.82 UIU/ML (ref 0.3–5)
WBC: 7.1 THOU/UL (ref 4–11)

## 2020-02-27 ASSESSMENT — MIFFLIN-ST. JEOR: SCORE: 1256.85

## 2020-02-27 ASSESSMENT — PATIENT HEALTH QUESTIONNAIRE - PHQ9: SUM OF ALL RESPONSES TO PHQ QUESTIONS 1-9: 0

## 2020-02-28 LAB
25(OH)D3 SERPL-MCNC: 63.6 NG/ML (ref 30–80)
25(OH)D3 SERPL-MCNC: 63.6 NG/ML (ref 30–80)

## 2020-05-19 ENCOUNTER — AMBULATORY - HEALTHEAST (OUTPATIENT)
Dept: INTERVENTIONAL RADIOLOGY/VASCULAR | Facility: CLINIC | Age: 45
End: 2020-05-19

## 2020-05-19 DIAGNOSIS — Z11.59 ENCOUNTER FOR SCREENING FOR OTHER VIRAL DISEASES: ICD-10-CM

## 2020-05-20 ENCOUNTER — COMMUNICATION - HEALTHEAST (OUTPATIENT)
Dept: FAMILY MEDICINE | Facility: CLINIC | Age: 45
End: 2020-05-20

## 2020-05-26 ENCOUNTER — AMBULATORY - HEALTHEAST (OUTPATIENT)
Dept: FAMILY MEDICINE | Facility: CLINIC | Age: 45
End: 2020-05-26

## 2020-05-26 DIAGNOSIS — Z11.59 ENCOUNTER FOR SCREENING FOR OTHER VIRAL DISEASES: ICD-10-CM

## 2020-05-27 ENCOUNTER — OFFICE VISIT - HEALTHEAST (OUTPATIENT)
Dept: FAMILY MEDICINE | Facility: CLINIC | Age: 45
End: 2020-05-27

## 2020-05-27 DIAGNOSIS — I67.1 BRAIN ANEURYSM: ICD-10-CM

## 2020-05-27 DIAGNOSIS — Z01.818 PRE-OP EXAM: ICD-10-CM

## 2020-05-29 ENCOUNTER — HOSPITAL ENCOUNTER (OUTPATIENT)
Dept: INTERVENTIONAL RADIOLOGY/VASCULAR | Facility: CLINIC | Age: 45
Discharge: HOME | End: 2020-05-29
Attending: NEUROLOGICAL SURGERY | Admitting: RADIOLOGY

## 2020-05-29 DIAGNOSIS — I67.1 NONRUPTURED CEREBRAL ANEURYSM: ICD-10-CM

## 2020-05-29 LAB
CREAT SERPL-MCNC: 0.73 MG/DL (ref 0.6–1.1)
GFR SERPL CREATININE-BSD FRML MDRD: >60 ML/MIN/1.73M2
HCG UR QL: NEGATIVE
HGB BLD-MCNC: 11.6 G/DL (ref 12–16)
PLATELET # BLD AUTO: 253 THOU/UL (ref 140–440)
POTASSIUM BLD-SCNC: 4.9 MMOL/L (ref 3.5–5)

## 2020-08-04 ENCOUNTER — COMMUNICATION - HEALTHEAST (OUTPATIENT)
Dept: FAMILY MEDICINE | Facility: CLINIC | Age: 45
End: 2020-08-04

## 2020-08-05 ENCOUNTER — AMBULATORY - HEALTHEAST (OUTPATIENT)
Dept: FAMILY MEDICINE | Facility: CLINIC | Age: 45
End: 2020-08-05

## 2020-08-05 DIAGNOSIS — R53.83 FATIGUE, UNSPECIFIED TYPE: ICD-10-CM

## 2020-08-11 ENCOUNTER — AMBULATORY - HEALTHEAST (OUTPATIENT)
Dept: LAB | Facility: CLINIC | Age: 45
End: 2020-08-11

## 2020-08-11 DIAGNOSIS — D50.8 OTHER IRON DEFICIENCY ANEMIA: ICD-10-CM

## 2020-08-11 DIAGNOSIS — R53.83 FATIGUE, UNSPECIFIED TYPE: ICD-10-CM

## 2020-08-11 LAB
FERRITIN SERPL-MCNC: 27 NG/ML (ref 10–130)
HGB BLD-MCNC: 12.2 G/DL (ref 12–16)

## 2020-08-13 ENCOUNTER — COMMUNICATION - HEALTHEAST (OUTPATIENT)
Dept: ADMINISTRATIVE | Facility: HOSPITAL | Age: 45
End: 2020-08-13

## 2020-09-03 ENCOUNTER — OFFICE VISIT - HEALTHEAST (OUTPATIENT)
Dept: ONCOLOGY | Facility: HOSPITAL | Age: 45
End: 2020-09-03

## 2020-09-03 DIAGNOSIS — D50.8 OTHER IRON DEFICIENCY ANEMIA: ICD-10-CM

## 2020-09-04 ENCOUNTER — INFUSION - HEALTHEAST (OUTPATIENT)
Dept: INFUSION THERAPY | Facility: HOSPITAL | Age: 45
End: 2020-09-04

## 2020-09-04 DIAGNOSIS — D50.8 OTHER IRON DEFICIENCY ANEMIA: ICD-10-CM

## 2020-09-14 ENCOUNTER — INFUSION - HEALTHEAST (OUTPATIENT)
Dept: INFUSION THERAPY | Facility: HOSPITAL | Age: 45
End: 2020-09-14

## 2020-09-14 DIAGNOSIS — D50.8 OTHER IRON DEFICIENCY ANEMIA: ICD-10-CM

## 2020-09-18 RX ORDER — SUMATRIPTAN 50 MG/1
50 TABLET, FILM COATED ORAL
Qty: 18 TABLET | Refills: 11 | Status: SHIPPED | OUTPATIENT
Start: 2020-09-18 | End: 2020-10-02

## 2020-09-18 NOTE — TELEPHONE ENCOUNTER
Pt left msg stating she was returning your call. She's asking if you would call her on her cell phone instead of the home. Cell: [phone]

## 2020-09-22 ENCOUNTER — AMBULATORY - HEALTHEAST (OUTPATIENT)
Dept: FAMILY MEDICINE | Facility: CLINIC | Age: 45
End: 2020-09-22

## 2020-09-22 ENCOUNTER — AMBULATORY - HEALTHEAST (OUTPATIENT)
Dept: NURSING | Facility: CLINIC | Age: 45
End: 2020-09-22

## 2020-09-22 DIAGNOSIS — Z23 NEED FOR HEPATITIS B VACCINATION: ICD-10-CM

## 2020-10-02 ENCOUNTER — VIRTUAL VISIT (OUTPATIENT)
Dept: NEUROLOGY | Facility: CLINIC | Age: 45
End: 2020-10-02
Payer: COMMERCIAL

## 2020-10-02 VITALS — BODY MASS INDEX: 24.8 KG/M2 | WEIGHT: 140 LBS | HEIGHT: 63 IN

## 2020-10-02 DIAGNOSIS — G47.00 INSOMNIA, UNSPECIFIED TYPE: Primary | ICD-10-CM

## 2020-10-02 DIAGNOSIS — I67.1 CEREBRAL ANEURYSM, NONRUPTURED: ICD-10-CM

## 2020-10-02 PROBLEM — R53.83 FATIGUE: Status: ACTIVE | Noted: 2020-10-02

## 2020-10-02 PROBLEM — E55.9 VITAMIN D DEFICIENCY: Status: ACTIVE | Noted: 2020-10-02

## 2020-10-02 PROCEDURE — 99214 OFFICE O/P EST MOD 30 MIN: CPT | Mod: 95 | Performed by: PSYCHIATRY & NEUROLOGY

## 2020-10-02 RX ORDER — DROSPIRENONE AND ETHINYL ESTRADIOL 0.03MG-3MG
1 KIT ORAL DAILY
COMMUNITY
End: 2021-11-24

## 2020-10-02 RX ORDER — SUMATRIPTAN 6 MG/.5ML
6 INJECTION, SOLUTION SUBCUTANEOUS PRN
Qty: 6 ML | Refills: 11 | Status: SHIPPED | OUTPATIENT
Start: 2020-10-02 | End: 2022-07-26

## 2020-10-02 RX ORDER — CYCLOBENZAPRINE HCL 5 MG
5 TABLET ORAL 3 TIMES DAILY PRN
Qty: 90 TABLET | Refills: 1 | Status: SHIPPED | OUTPATIENT
Start: 2020-10-02 | End: 2022-03-29

## 2020-10-02 RX ORDER — SUMATRIPTAN 50 MG/1
50 TABLET, FILM COATED ORAL
Qty: 18 TABLET | Refills: 11 | Status: SHIPPED | OUTPATIENT
Start: 2020-10-02 | End: 2021-10-15

## 2020-10-02 RX ORDER — ERGOCALCIFEROL 1.25 MG/1
CAPSULE, LIQUID FILLED ORAL
COMMUNITY
Start: 2020-09-24 | End: 2021-12-13

## 2020-10-02 SDOH — HEALTH STABILITY: MENTAL HEALTH: HOW OFTEN DO YOU HAVE A DRINK CONTAINING ALCOHOL?: NOT ASKED

## 2020-10-02 SDOH — HEALTH STABILITY: MENTAL HEALTH: HOW MANY STANDARD DRINKS CONTAINING ALCOHOL DO YOU HAVE ON A TYPICAL DAY?: NOT ASKED

## 2020-10-02 SDOH — HEALTH STABILITY: MENTAL HEALTH: HOW OFTEN DO YOU HAVE 6 OR MORE DRINKS ON ONE OCCASION?: NOT ASKED

## 2020-10-02 ASSESSMENT — MIFFLIN-ST. JEOR: SCORE: 1239.17

## 2020-10-02 NOTE — LETTER
"    10/2/2020         RE: Shyann Jurado  [address] [place] [id number]        Dear Colleague,    Thank you for referring your patient, Shyann Jurado, to the [facility] [place]. Please see a copy of my visit note below.    NEUROLOGY OUTPATIENT PROGRESS NOTE (VIDEO)  Oct 2, 2020     CHIEF COMPLAINT/REASON FOR VISIT/REASON FOR CONSULT  Patient presents with:  Headache    REASON FOR CONSULTATION- Headaches    Video Visit Consent  Patient is being evaluated via a billable video visit. The patient has been notified of following:   \"This video visit will be conducted via a call between you and your physician/provider. We have found that certain health care needs can be provided without the need for an in-person physical exam. This service lets us provide the care you need with a video conversation. If a prescription is necessary we can send it directly to your pharmacy. If lab work is needed we can place an order for that and you can then stop by our lab to have the test done at a later time.  If during the course of the call the physician/provider feels a video visit is not appropriate, you will not be charged for this service.  Physician has received verbal consent for a Video Visit from the patient? YES  Patient would like the video invitation sent by: Email/SMS    Video Visit Details  Type of service: Video Visit  Video Start Time: 12:28  Video End Time (time video stopped): 12:47  Originating Location (pt. Location): Patient's Home  Distant Location (provider location): [facility] [place]   Mode of Communication: Video Conference via [email or web address]      HISTORY OF PRESENT ILLNESS  Shyann Jurado is a 45 year old female seen today for headaches. She reports that her headache started about 2-1/2 years ago. They have always been sporadic once or twice a month. No clear triggers for the headaches have been identified. To be related to birth control and she was switched " from Irma to Sarah. This did not significantly help the headaches. There is some neck tension involved which could be leading to the headaches. Headaches generally are behind the right eye with some sharp pain photophobia and phonophobia. She does have a history of a cerebral aneurysm identified on the MRI done for headaches. Headaches are thought to be unrelated to the aneurysm. She is currently followed by Dr. Álvarez who plans to do yearly MRI brain to evaluate for aneurysms. Imitrex 50 mg has been beneficial in the past. She is also been using Flexeril intermittently. There is some history of iron deficiency. She has difficulty sleeping and uses melatonin and Unisom. Tizanidine did not provide any benefit and patient was switched to Flexeril. She has had some hair loss side effects with the Topamax. Propanolol tried last time could not be tolerated because of side effects and she stopped the medication. Imitrex injections have also been prescribed for abortive therapy though she mainly uses the oral tablets.    Patient returns today reports that she is using 1 tablet of Flexeril every night. Headaches have significantly improved and she only has one headache in the last 6 weeks since I saw her. She used a combination of propanolol Flexeril Imitrex and caffeine as abortive therapy which worked well for her. Has gained some weight on the propanolol. Currently is off the propanolol on every day basis. Reports that her headache was at the time of the menstrual cycle. She is planning on seeing her OB/GYN may be change of birth control. Complains of some spotting. She is sleeping better with the Flexeril.    10/4/20  Patient returns today.  She reports that in the summer months she did not have any headaches no recently headaches have come back.  Headaches unchanged in nature.  The headache is still behind the right eye.  She wonders if the headaches might be related to her cycle being a bit off though is not  "completely sure what caused it.  There was some stress involved because her daughter tested positive for COVID though that should have made the headaches worse during summertime.  She did have one headache in September this started with right-sided neck tension and was slightly more severe than her baseline headaches.  She is using propanolol Flexeril Imitrex and caffeine for abortive therapy.  This combination seems to work for her.  Is not taking any preventive medication at this point.  Is using Flexeril to help with sleep.  Has rarely (about 2 times) needed to use the Imitrex injection.    She did receive a diagnostic angiogram and her aneurysm is stable.  This plans to do another angiogram in 1 year.    Previous history is reviewed and this is unchanged.    PAST MEDICAL/SURGICAL HISTORY  No past medical history on file.  Patient Active Problem List   Diagnosis     Vitamin D deficiency     Iron deficiency anemia     Insomnia     Hypothyroidism     History of colonic polyps     Fatigue     Chronic migraine     Brain aneurysm   Significant for high cholesterol, migraine headaches, depression, iron deficiency      FAMILY HISTORY  Family History   Problem Relation Age of Onset     Migraines Mother    Reviewed and negative for neurological conditions    SOCIAL HISTORY  Social History     Tobacco Use     Smoking status: Never Smoker     Smokeless tobacco: Never Used   Substance Use Topics     Alcohol use: Not Currently     Drug use: None       SYSTEMS REVIEW  Twelve-system ROS was done and other than the HPI this was negative.     MEDICATIONS       drospirenone-ethinyl estradiol (ADRIÁN) 3-0.03 MG tablet,        vitamin D2 (ERGOCALCIFEROL) 84640 units (1250 mcg) capsule, TK 1 C PO WEEKLY    No current facility-administered medications on file prior to visit.        PHYSICAL EXAMINATION  VITALS: Ht 1.6 m (5' 3\")   Wt 63.5 kg (140 lb)   BMI 24.80 kg/m    PHYSICAL EXAM  Exam was limited due to video " encounter.    Vitals-Unable to do on video  GENERAL -Health appearing, No apparent distress  EYES- No scleral icterus, no eyelid droop, Pupils symmetric  HEENT - Normocephalic, atraumatic, Hearing grossly intact; Oral mucosa moist and pink in color. External Ears and nose intact.   Neck - soft/flexible with normal ROM on visual inspection.  PULM - Good spontaneous respiratory effort;  CV- No edema on visual inspection  MSK- Gait - see Neuro section; Strength and tone- see Neuro section; Range of motion grossly intact.  Psych- Normal mood and affect. Good judgment and insight.     Neurological  Mental status - Patient is awake and oriented. Attention span is normal. Language is fluent and follows commands appropriately.   Cranial nerves - Pupils are symmetric; EOMI, NLF symmetric  Motor - There is no pronator drift. Antigravity in all 4 ext.  Tone - No evidence of rigidity on visual inspection. No tremor.  Reflexes - Unable to do on video  Sensation - Unable to do on Video  Coordination - Finger to nose without dysmetria.   Gait and station - Romberg is negative. Gait is steady      PREVIOUS EXAM  GENERAL: Healthy appearing, alert, no acute distress, normal habitus.  CARDIOVASCULAR: Ext warm and well perfused. Pulses present. NEUROLOGICAL: Patient is awake and oriented to self, place and time. Attention span is normal. Memory grossly intact. Language is fluent and follows commands appropriately. Appropriate fund of knowledge. Cranial nerves 2-12 are intact. There is no pronator drift. Motor exam shows 5/5 strength in all extremities. Tone is symmetric bilaterally in upper and lower extremities. Finger to nose is without dysmetria. Gait is normal and the patient is able to do tandem walk.    DIAGNOSTICS  DSA  Conically-shaped right internal carotid artery superior hypophyseal   aneurysm measures 1.3 mm deep by 1.7 mm x 2.1 mm across. The aneurysm neck   measures up to 2.1 mm in maximum dimension. There is very mild  fusiform   ectasia of the paraclinoid segment   giving rise to this more focal saccular aneurysm.    DSA 2020  1. 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    2. No evidence of new intracranial aneurysm.    Results were discussed with the patient and conveyed to the neurosurgical team immediately following the procedure.    Evaluation and stenosis determination based on NASCET criteria.    MRI  HEAD MRI:  1.  Negative brain MRI. No acute intracranial finding. No evidence for recent  ischemia, intracranial hemorrhage, or mass.     HEAD MRA:  1.  Approximately 2 mm outpouching in the region of the right carotid cave may  reflect a small aneurysm. This is near the dural rings and could be intradural  in location.  2.  Otherwise negative brain MRA.        RELEVANT LABS  Ferritin 67    IMPRESSION/REPORT/PLAN  Insomnia, unspecified type  Chronic migraine  Cerebral aneurysm, nonruptured      This is a 45 year old female chronic migraines, unrelated cerebral aneurysm, insomnia. Headaches have improved to less than one headache a month with using Flexeril at nighttime. She can continue that treatment. She can use Imitrex, Flexeril, propanolol, caffeine for abortive therapy. I am not sure how much the propanolol is helping as abortive therapy.  We will refill her medications.  Encouraged her to keep a log to see if she can find a pattern to see why the headaches might be getting worse at this point.  I will see her back in 6 months.  Further cerebral aneurysm she would need yearly scans though I think a MRA would be sufficient and she does not need a full diagnostic angiogram.  She is following up with neurosurgery regarding these aneurysms.    Other recommendations in the past include:-In terms of her birth control encouraged her to talk to her OB/GYN doctor. The progesterone is probably what is causing  the spotting that she is having. We did discuss the changes in both control might make the headaches worse.  Patient seems to be sensitive to medication and Botox could be an option. CGRP antagonist could also be used if her headaches are more often.      -     SUMAtriptan (IMITREX) 6 MG/0.5ML injection; Inject 0.5 mLs (6 mg) Subcutaneous as needed for migraine (Headache)  -     SUMAtriptan (IMITREX) 50 MG tablet; Take 1 tablet (50 mg) by mouth at onset of headache for migraine  -     cyclobenzaprine (FLEXERIL) 5 MG tablet; Take 1 tablet (5 mg) by mouth 3 times daily as needed for muscle spasms      Return in about 6 months (around 4/2/2021) for Virtual or clinic.    Over 25 minutes were spent coordinating the care for the patient today.  More than 50% of the time was spent counseling, educating the patient.    King Taylor MD  Neurologist  [facility]  Tel:- [phone]    This note was dictated using voice recognition software.  Any grammatical or context distortions are unintentional and inherent to the software.        Again, thank you for allowing me to participate in the care of your patient.        Sincerely,        King Taylor MD

## 2020-10-30 ENCOUNTER — TELEPHONE (OUTPATIENT)
Dept: NEUROLOGY | Facility: CLINIC | Age: 45
End: 2020-10-30

## 2020-10-30 NOTE — TELEPHONE ENCOUNTER
Pt left msg requesting a call back to discuss Botox. She states nothing else is working for her. [phone]

## 2020-11-02 RX ORDER — NORTRIPTYLINE HCL 10 MG
CAPSULE ORAL
Qty: 180 CAPSULE | Refills: 1 | Status: SHIPPED | OUTPATIENT
Start: 2020-11-02 | End: 2020-11-23

## 2020-11-02 NOTE — TELEPHONE ENCOUNTER
I have prescribed nortriptyline. Ok to proceed with Botox. Can see her once PA is done.   If she has side effects to nortriptyline she can stop it.

## 2020-11-05 ENCOUNTER — AMBULATORY - HEALTHEAST (OUTPATIENT)
Dept: NURSING | Facility: CLINIC | Age: 45
End: 2020-11-05

## 2020-11-23 ENCOUNTER — OFFICE VISIT (OUTPATIENT)
Dept: NEUROLOGY | Facility: CLINIC | Age: 45
End: 2020-11-23
Payer: COMMERCIAL

## 2020-11-23 VITALS
HEIGHT: 63 IN | BODY MASS INDEX: 25.69 KG/M2 | HEART RATE: 79 BPM | DIASTOLIC BLOOD PRESSURE: 81 MMHG | SYSTOLIC BLOOD PRESSURE: 120 MMHG | WEIGHT: 145 LBS

## 2020-11-23 DIAGNOSIS — I67.1 CEREBRAL ANEURYSM, NONRUPTURED: ICD-10-CM

## 2020-11-23 DIAGNOSIS — G47.00 INSOMNIA, UNSPECIFIED TYPE: ICD-10-CM

## 2020-11-23 PROCEDURE — 64615 CHEMODENERV MUSC MIGRAINE: CPT | Performed by: PSYCHIATRY & NEUROLOGY

## 2020-11-23 PROCEDURE — 99214 OFFICE O/P EST MOD 30 MIN: CPT | Mod: 25 | Performed by: PSYCHIATRY & NEUROLOGY

## 2020-11-23 ASSESSMENT — MIFFLIN-ST. JEOR: SCORE: 1261.85

## 2020-11-23 NOTE — LETTER
11/23/2020         RE: Shyann Jurado  [address] [place] [id number]        Dear Colleague,    Thank you for referring your patient, Shyann Jurado, to the [facility] [place]. Please see a copy of my visit note below.    NEUROLOGY OUTPATIENT PROGRESS NOTE  Nov 23, 2020     CHIEF COMPLAINT/REASON FOR VISIT/REASON FOR CONSULT  Patient presents with:  Botox    REASON FOR CONSULTATION- Headaches    HISTORY OF PRESENT ILLNESS  Shyann Jurado is a 45 year old female seen today for headaches. She reports that her headache started about 2-1/2 years ago. They have always been sporadic once or twice a month. No clear triggers for the headaches have been identified. To be related to birth control and she was switched from Irma to Sarah. This did not significantly help the headaches. There is some neck tension involved which could be leading to the headaches. Headaches generally are behind the right eye with some sharp pain photophobia and phonophobia. She does have a history of a cerebral aneurysm identified on the MRI done for headaches. Headaches are thought to be unrelated to the aneurysm. She is currently followed by Dr. Álvarez who plans to do yearly MRI brain to evaluate for aneurysms. Imitrex 50 mg has been beneficial in the past. She is also been using Flexeril intermittently. There is some history of iron deficiency. She has difficulty sleeping and uses melatonin and Unisom. Tizanidine did not provide any benefit and patient was switched to Flexeril. She has had some hair loss side effects with the Topamax. Propanolol tried last time could not be tolerated because of side effects and she stopped the medication. Imitrex injections have also been prescribed for abortive therapy though she mainly uses the oral tablets.    Patient returns today reports that she is using 1 tablet of Flexeril every night. Headaches have significantly improved and she only has one headache in the last  6 weeks since I saw her. She used a combination of propanolol Flexeril Imitrex and caffeine as abortive therapy which worked well for her. Has gained some weight on the propanolol. Currently is off the propanolol on every day basis. Reports that her headache was at the time of the menstrual cycle. She is planning on seeing her OB/GYN may be change of birth control. Complains of some spotting. She is sleeping better with the Flexeril.    10/4/20  Patient returns today.  She reports that in the summer months she did not have any headaches no recently headaches have come back.  Headaches unchanged in nature.  The headache is still behind the right eye.  She wonders if the headaches might be related to her cycle being a bit off though is not completely sure what caused it.  There was some stress involved because her daughter tested positive for COVID though that should have made the headaches worse during summertime.  She did have one headache in September this started with right-sided neck tension and was slightly more severe than her baseline headaches.  She is using propanolol Flexeril Imitrex and caffeine for abortive therapy.  This combination seems to work for her.  Is not taking any preventive medication at this point.  Is using Flexeril to help with sleep.  Has rarely (about 2 times) needed to use the Imitrex injection.    She did receive a diagnostic angiogram and her aneurysm is stable.  This plans to do another angiogram in 1 year.    11/23/20  Patient returns today.  Reports that for the last 2 weeks she has been having a continuous headache.  She was prescribed nortriptyline when she had called the clinic which she has not tried so far.  She does not want to be on medications.  Is interested in getting the Botox done today.  She also gets cosmetic Botox for her forehead.  She reports this was a month ago when she got 10 units.  She thinks her some of her headaches might be hormonal in nature.  She continues  to use Leksell and Imitrex for abortive therapy.  We talked about her being premenopause.  She is going to stop her birth control.  She is thinking about hysterectomy.  Her mother needed to get hysterectomy done.  We talked about that the migraines might get worse stopping the birth control or after menopause.  She does not need to get the hysterectomy just because of the headaches.  There is no clear evidence that the hysterectomy will help the headaches.  Headaches otherwise unchanged nature.  She has a few bad headaches since she was last seen.    She is sleeping well at this point with the Flexeril at nighttime.  No aneurysm symptoms.    Previous history is reviewed and this is unchanged.    PAST MEDICAL/SURGICAL HISTORY  History reviewed. No pertinent past medical history.  Patient Active Problem List   Diagnosis     Vitamin D deficiency     Iron deficiency anemia     Insomnia     Hypothyroidism     History of colonic polyps     Fatigue     Chronic migraine     Brain aneurysm   Significant for high cholesterol, migraine headaches, depression, iron deficiency      FAMILY HISTORY  Family History   Problem Relation Age of Onset     Migraines Mother    Reviewed and negative for neurological conditions    SOCIAL HISTORY  Social History     Tobacco Use     Smoking status: Never Smoker     Smokeless tobacco: Never Used   Substance Use Topics     Alcohol use: Not Currently     Drug use: None       SYSTEMS REVIEW  Twelve-system ROS was done and other than the HPI this was negative.     MEDICATIONS       cyclobenzaprine (FLEXERIL) 5 MG tablet, Take 1 tablet (5 mg) by mouth 3 times daily as needed for muscle spasms       drospirenone-ethinyl estradiol (ADRIÁN) 3-0.03 MG tablet,        SUMAtriptan (IMITREX) 50 MG tablet, Take 1 tablet (50 mg) by mouth at onset of headache for migraine       SUMAtriptan (IMITREX) 6 MG/0.5ML injection, Inject 0.5 mLs (6 mg) Subcutaneous as needed for migraine (Headache)       vitamin D2  "(ERGOCALCIFEROL) 51328 units (1250 mcg) capsule, TK 1 C PO WEEKLY    No current facility-administered medications on file prior to visit.        PHYSICAL EXAMINATION  VITALS: /81   Pulse 79   Ht 1.6 m (5' 3\")   Wt 65.8 kg (145 lb)   BMI 25.69 kg/m    GENERAL: Healthy appearing, alert, no acute distress, normal habitus.  CARDIOVASCULAR: Ext warm and well perfused. Pulses present. NEUROLOGICAL: Patient is awake and oriented to self, place and time. Attention span is normal. Memory grossly intact. Language is fluent and follows commands appropriately. Appropriate fund of knowledge. Cranial nerves 2-12 are intact. There is no pronator drift. Motor exam shows 5/5 strength in all extremities. Tone is symmetric bilaterally in upper and lower extremities. Finger to nose is without dysmetria. Gait is normal and the patient is able to do tandem walk.    DIAGNOSTICS  DSA  Conically-shaped right internal carotid artery superior hypophyseal   aneurysm measures 1.3 mm deep by 1.7 mm x 2.1 mm across. The aneurysm neck   measures up to 2.1 mm in maximum dimension. There is very mild fusiform   ectasia of the paraclinoid segment   giving rise to this more focal saccular aneurysm.    DSA 2020  1. 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    2. No evidence of new intracranial aneurysm.    Results were discussed with the patient and conveyed to the neurosurgical team immediately following the procedure.    Evaluation and stenosis determination based on NASCET criteria.    MRI  HEAD MRI:  1.  Negative brain MRI. No acute intracranial finding. No evidence for recent  ischemia, intracranial hemorrhage, or mass.     HEAD MRA:  1.  Approximately 2 mm outpouching in the region of the right carotid cave may  reflect a small aneurysm. This is near the dural rings and could be intradural  in location.  2.  " Otherwise negative brain MRA.        RELEVANT LABS  Ferritin 67    IMPRESSION/REPORT/PLAN  Insomnia, unspecified type  Chronic migraine  Cerebral aneurysm, nonruptured      This is a 45 year old female chronic migraines, unrelated cerebral aneurysm, insomnia.  Headaches have worsened again and this could be hormonal.  She can use Imitrex, Flexeril, propanolol, caffeine for abortive therapy. I am not sure how much the propanolol is helping as abortive therapy.  We will start with Botox today to see how she does.  She does get cosmetic Botox and encouraged her to not do it at different dates to prevent clogging of resistance.  She should keep a log of her headaches to see if the Botox is helping or not.  Can use Flexeril for insomnia nighttime.  Further cerebral aneurysm she would need yearly scans though I think a MRA would be sufficient and she does not need a full diagnostic angiogram.  She is following up with neurosurgery regarding these aneurysms.    Other recommendations in the past include:-In terms of her birth control encouraged her to talk to her OB/GYN doctor. The progesterone is probably what is causing the spotting that she is having. We did discuss the changes in both control might make the headaches worse.  Patient seems to be sensitive to medication and Botox could be an option. CGRP antagonist could also be used if her headaches are more often.  She should discuss the hysterectomy with her gynecologist.  It is not needed for the migraines.      -     SUMAtriptan (IMITREX) 6 MG/0.5ML injection; Inject 0.5 mLs (6 mg) Subcutaneous as needed for migraine (Headache)  -     SUMAtriptan (IMITREX) 50 MG tablet; Take 1 tablet (50 mg) by mouth at onset of headache for migraine  -     cyclobenzaprine (FLEXERIL) 5 MG tablet; Take 1 tablet (5 mg) by mouth 3 times daily as needed for muscle spasms  - Propanolol as needed for abortive therapy of headaches.      Return in about 3 months (around 2/23/2021) for  Botox.    Over 25 minutes were spent coordinating the care for the patient today.  More than 50% of the time was spent counseling, educating the patient.    King Taylor MD  Neurologist  [facility] [place] [facility]  Tel:- [phone]    This note was dictated using voice recognition software.  Any grammatical or context distortions are unintentional and inherent to the software.        Again, thank you for allowing me to participate in the care of your patient.        Sincerely,        King Taylor MD

## 2020-11-23 NOTE — PROGRESS NOTES
NEUROLOGY OUTPATIENT PROGRESS NOTE  Nov 23, 2020     CHIEF COMPLAINT/REASON FOR VISIT/REASON FOR CONSULT  Patient presents with:  Botox    REASON FOR CONSULTATION- Headaches    HISTORY OF PRESENT ILLNESS  Shyann Jurado is a 45 year old female seen today for headaches. She reports that her headache started about 2-1/2 years ago. They have always been sporadic once or twice a month. No clear triggers for the headaches have been identified. To be related to birth control and she was switched from Irma to Sarah. This did not significantly help the headaches. There is some neck tension involved which could be leading to the headaches. Headaches generally are behind the right eye with some sharp pain photophobia and phonophobia. She does have a history of a cerebral aneurysm identified on the MRI done for headaches. Headaches are thought to be unrelated to the aneurysm. She is currently followed by Dr. Álvarez who plans to do yearly MRI brain to evaluate for aneurysms. Imitrex 50 mg has been beneficial in the past. She is also been using Flexeril intermittently. There is some history of iron deficiency. She has difficulty sleeping and uses melatonin and Unisom. Tizanidine did not provide any benefit and patient was switched to Flexeril. She has had some hair loss side effects with the Topamax. Propanolol tried last time could not be tolerated because of side effects and she stopped the medication. Imitrex injections have also been prescribed for abortive therapy though she mainly uses the oral tablets.    Patient returns today reports that she is using 1 tablet of Flexeril every night. Headaches have significantly improved and she only has one headache in the last 6 weeks since I saw her. She used a combination of propanolol Flexeril Imitrex and caffeine as abortive therapy which worked well for her. Has gained some weight on the propanolol. Currently is off the propanolol on every day basis. Reports that her headache  was at the time of the menstrual cycle. She is planning on seeing her OB/GYN may be change of birth control. Complains of some spotting. She is sleeping better with the Flexeril.    10/4/20  Patient returns today.  She reports that in the summer months she did not have any headaches no recently headaches have come back.  Headaches unchanged in nature.  The headache is still behind the right eye.  She wonders if the headaches might be related to her cycle being a bit off though is not completely sure what caused it.  There was some stress involved because her daughter tested positive for COVID though that should have made the headaches worse during summertime.  She did have one headache in September this started with right-sided neck tension and was slightly more severe than her baseline headaches.  She is using propanolol Flexeril Imitrex and caffeine for abortive therapy.  This combination seems to work for her.  Is not taking any preventive medication at this point.  Is using Flexeril to help with sleep.  Has rarely (about 2 times) needed to use the Imitrex injection.    She did receive a diagnostic angiogram and her aneurysm is stable.  This plans to do another angiogram in 1 year.    11/23/20  Patient returns today.  Reports that for the last 2 weeks she has been having a continuous headache.  She was prescribed nortriptyline when she had called the clinic which she has not tried so far.  She does not want to be on medications.  Is interested in getting the Botox done today.  She also gets cosmetic Botox for her forehead.  She reports this was a month ago when she got 10 units.  She thinks her some of her headaches might be hormonal in nature.  She continues to use Leksell and Imitrex for abortive therapy.  We talked about her being premenopause.  She is going to stop her birth control.  She is thinking about hysterectomy.  Her mother needed to get hysterectomy done.  We talked about that the migraines might get  "worse stopping the birth control or after menopause.  She does not need to get the hysterectomy just because of the headaches.  There is no clear evidence that the hysterectomy will help the headaches.  Headaches otherwise unchanged nature.  She has a few bad headaches since she was last seen.    She is sleeping well at this point with the Flexeril at nighttime.  No aneurysm symptoms.    Previous history is reviewed and this is unchanged.    PAST MEDICAL/SURGICAL HISTORY  History reviewed. No pertinent past medical history.  Patient Active Problem List   Diagnosis     Vitamin D deficiency     Iron deficiency anemia     Insomnia     Hypothyroidism     History of colonic polyps     Fatigue     Chronic migraine     Brain aneurysm   Significant for high cholesterol, migraine headaches, depression, iron deficiency      FAMILY HISTORY  Family History   Problem Relation Age of Onset     Migraines Mother    Reviewed and negative for neurological conditions    SOCIAL HISTORY  Social History     Tobacco Use     Smoking status: Never Smoker     Smokeless tobacco: Never Used   Substance Use Topics     Alcohol use: Not Currently     Drug use: None       SYSTEMS REVIEW  Twelve-system ROS was done and other than the HPI this was negative.     MEDICATIONS       cyclobenzaprine (FLEXERIL) 5 MG tablet, Take 1 tablet (5 mg) by mouth 3 times daily as needed for muscle spasms       drospirenone-ethinyl estradiol (ADRIÁN) 3-0.03 MG tablet,        SUMAtriptan (IMITREX) 50 MG tablet, Take 1 tablet (50 mg) by mouth at onset of headache for migraine       SUMAtriptan (IMITREX) 6 MG/0.5ML injection, Inject 0.5 mLs (6 mg) Subcutaneous as needed for migraine (Headache)       vitamin D2 (ERGOCALCIFEROL) 13063 units (1250 mcg) capsule, TK 1 C PO WEEKLY    No current facility-administered medications on file prior to visit.        PHYSICAL EXAMINATION  VITALS: /81   Pulse 79   Ht 1.6 m (5' 3\")   Wt 65.8 kg (145 lb)   BMI 25.69 kg/m  "   GENERAL: Healthy appearing, alert, no acute distress, normal habitus.  CARDIOVASCULAR: Ext warm and well perfused. Pulses present. NEUROLOGICAL: Patient is awake and oriented to self, place and time. Attention span is normal. Memory grossly intact. Language is fluent and follows commands appropriately. Appropriate fund of knowledge. Cranial nerves 2-12 are intact. There is no pronator drift. Motor exam shows 5/5 strength in all extremities. Tone is symmetric bilaterally in upper and lower extremities. Finger to nose is without dysmetria. Gait is normal and the patient is able to do tandem walk.    DIAGNOSTICS  DSA  Conically-shaped right internal carotid artery superior hypophyseal   aneurysm measures 1.3 mm deep by 1.7 mm x 2.1 mm across. The aneurysm neck   measures up to 2.1 mm in maximum dimension. There is very mild fusiform   ectasia of the paraclinoid segment   giving rise to this more focal saccular aneurysm.    DSA 2020  1. 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    2. No evidence of new intracranial aneurysm.    Results were discussed with the patient and conveyed to the neurosurgical team immediately following the procedure.    Evaluation and stenosis determination based on NASCET criteria.    MRI  HEAD MRI:  1.  Negative brain MRI. No acute intracranial finding. No evidence for recent  ischemia, intracranial hemorrhage, or mass.     HEAD MRA:  1.  Approximately 2 mm outpouching in the region of the right carotid cave may  reflect a small aneurysm. This is near the dural rings and could be intradural  in location.  2.  Otherwise negative brain MRA.        RELEVANT LABS  Ferritin 67    IMPRESSION/REPORT/PLAN  Insomnia, unspecified type  Chronic migraine  Cerebral aneurysm, nonruptured      This is a 45 year old female chronic migraines, unrelated cerebral aneurysm, insomnia.   Headaches have worsened again and this could be hormonal.  She can use Imitrex, Flexeril, propanolol, caffeine for abortive therapy. I am not sure how much the propanolol is helping as abortive therapy.  We will start with Botox today to see how she does.  She does get cosmetic Botox and encouraged her to not do it at different dates to prevent clogging of resistance.  She should keep a log of her headaches to see if the Botox is helping or not.  Can use Flexeril for insomnia nighttime.  Further cerebral aneurysm she would need yearly scans though I think a MRA would be sufficient and she does not need a full diagnostic angiogram.  She is following up with neurosurgery regarding these aneurysms.    Other recommendations in the past include:-In terms of her birth control encouraged her to talk to her OB/GYN doctor. The progesterone is probably what is causing the spotting that she is having. We did discuss the changes in both control might make the headaches worse.  Patient seems to be sensitive to medication and Botox could be an option. CGRP antagonist could also be used if her headaches are more often.  She should discuss the hysterectomy with her gynecologist.  It is not needed for the migraines.      -     SUMAtriptan (IMITREX) 6 MG/0.5ML injection; Inject 0.5 mLs (6 mg) Subcutaneous as needed for migraine (Headache)  -     SUMAtriptan (IMITREX) 50 MG tablet; Take 1 tablet (50 mg) by mouth at onset of headache for migraine  -     cyclobenzaprine (FLEXERIL) 5 MG tablet; Take 1 tablet (5 mg) by mouth 3 times daily as needed for muscle spasms  - Propanolol as needed for abortive therapy of headaches.      Return in about 3 months (around 2/23/2021) for Botox.    Over 25 minutes were spent coordinating the care for the patient today.  More than 50% of the time was spent counseling, educating the patient.    King Taylor MD  Neurologist  [facility]  Tel:- [phone]    This  note was dictated using voice recognition software.  Any grammatical or context distortions are unintentional and inherent to the software.

## 2020-11-23 NOTE — NURSING NOTE
Chief Complaint   Patient presents with     Botox     Gricelda Rowley CMA on 11/23/2020 at 8:51 AM

## 2020-11-30 NOTE — PROCEDURES
NEUROLOGY PROCEDURE NOTE  Nov 23, 2020      Chronic migraine Botox injection    CONSENT: The procedure was explained to the patient. The risks and benefits of the procedure and the alternatives were discussed with the patient. The patient was given an opportunity to ask any questions she had about the procedure. A verbal consent was obtained from the patient. A written consent is available in the chart.    PROCEDURE SUMMARY:   The following muscles were identified after palpating for landmarks and the overlying skin was cleansed with alcohol. These muscles were then injected using a 30 guage- half  inch needle with the following doses of onabotulinumtoxin A. A 5 unit/ 0.1 ml dilution was used for the injections. A 2 x 100 unit vial was used.    1. Corrugators 5 units each side (not done).  2. Procerus 5 units (not done).  3. Frontalis 10 units each side (not done).  4. Temporalis 20 units each side.  5. Occipitalis 15 units each side.  6. Paraspinals 10 units each side.  7. Trapezius 15 units each side.    Botox not injected in the forehead as she also gets cosmetic Botox in the forehead.     Units Injected: 120 Units  Units Discarded: 80 Units  Lot Number and Expiration: [id number]; 8/2023    The patient tolerated the procedure without any immediate complaints and will call the Neurology clinic if she has any problems or side effects from the medication. The patient will follow up in the Neurology clinic as previously decided.      King Taylor MD  Neurologist  [facility]  [phone]

## 2020-12-12 ENCOUNTER — COMMUNICATION - HEALTHEAST (OUTPATIENT)
Dept: FAMILY MEDICINE | Facility: CLINIC | Age: 45
End: 2020-12-12

## 2020-12-12 DIAGNOSIS — E55.9 VITAMIN D DEFICIENCY: ICD-10-CM

## 2021-01-19 ENCOUNTER — HOSPITAL ENCOUNTER (OUTPATIENT)
Dept: MAMMOGRAPHY | Facility: CLINIC | Age: 46
Discharge: HOME | End: 2021-01-19
Attending: FAMILY MEDICINE

## 2021-01-19 DIAGNOSIS — Z12.31 VISIT FOR SCREENING MAMMOGRAM: ICD-10-CM

## 2021-01-25 ENCOUNTER — TELEPHONE (OUTPATIENT)
Dept: NEUROLOGY | Facility: CLINIC | Age: 46
End: 2021-01-25

## 2021-01-25 NOTE — TELEPHONE ENCOUNTER
Prior Authorization Retail Medication Request    Medication/Dose:   ICD code (if different than what is on RX):   Previously Tried and Failed:    Rationale:      Insurance Name:    Insurance ID:        Pharmacy Information (if different than what is on RX)  Name:    Phone:

## 2021-01-27 NOTE — TELEPHONE ENCOUNTER
PA Initiation    Medication: Rimegepant Sulfate (NURTEC) 75 MG TBDP   Insurance Company: [employer] - Phone [phone] Fax [phone]  Pharmacy Filling the Rx: [id number] - [place], [place] - [address] [place] [address] E AT [place] [address] & [facility]  Filling Pharmacy Phone: [phone]  Filling Pharmacy Fax: [phone]  Start Date: 1/27/2021

## 2021-01-28 NOTE — TELEPHONE ENCOUNTER
Prior Authorization Approval    Authorization Effective Date: 1/25/2021  Authorization Expiration Date: 1/25/2022  Medication: Rimegepant Sulfate (NURTEC) 75 MG TBDP--APPROVED  Approved Dose/Quantity:   Reference #: [id number], [id number]   Insurance Company: [employer] - Phone [phone] Fax [phone]  Expected CoPay:       CoPay Card Available:      Foundation Assistance Needed:    Which Pharmacy is filling the prescription (Not needed for infusion/clinic administered): [id number] - [place], [place] - [address] [place] [address] [address] [facility] [address] [facility]  Pharmacy Notified: Yes  Patient Notified: Yes **Instructed pharmacy to notify patient when script is ready to /ship.**

## 2021-02-08 ENCOUNTER — COMMUNICATION - HEALTHEAST (OUTPATIENT)
Dept: FAMILY MEDICINE | Facility: CLINIC | Age: 46
End: 2021-02-08

## 2021-02-16 DIAGNOSIS — Z11.59 ENCOUNTER FOR SCREENING FOR OTHER VIRAL DISEASES: ICD-10-CM

## 2021-02-21 ENCOUNTER — HEALTH MAINTENANCE LETTER (OUTPATIENT)
Age: 46
End: 2021-02-21

## 2021-02-22 ENCOUNTER — OFFICE VISIT - HEALTHEAST (OUTPATIENT)
Dept: FAMILY MEDICINE | Facility: CLINIC | Age: 46
End: 2021-02-22

## 2021-02-22 ENCOUNTER — AMBULATORY - HEALTHEAST (OUTPATIENT)
Dept: LAB | Facility: CLINIC | Age: 46
End: 2021-02-22

## 2021-02-22 DIAGNOSIS — Z11.59 ENCOUNTER FOR SCREENING FOR OTHER VIRAL DISEASES: ICD-10-CM

## 2021-02-22 DIAGNOSIS — I67.1 BRAIN ANEURYSM: ICD-10-CM

## 2021-02-22 DIAGNOSIS — Z01.818 PREOP EXAMINATION: ICD-10-CM

## 2021-02-22 DIAGNOSIS — H02.403 PTOSIS OF EYELID, BILATERAL: ICD-10-CM

## 2021-02-22 DIAGNOSIS — Z30.011 INITIATION OF OCP (BCP): ICD-10-CM

## 2021-02-22 LAB
HCG UR QL: NEGATIVE
HGB BLD-MCNC: 11.8 G/DL (ref 12–16)

## 2021-02-22 ASSESSMENT — MIFFLIN-ST. JEOR: SCORE: 1276.69

## 2021-02-22 ASSESSMENT — PATIENT HEALTH QUESTIONNAIRE - PHQ9: SUM OF ALL RESPONSES TO PHQ QUESTIONS 1-9: 0

## 2021-02-24 ENCOUNTER — OFFICE VISIT (OUTPATIENT)
Dept: NEUROLOGY | Facility: CLINIC | Age: 46
End: 2021-02-24
Payer: COMMERCIAL

## 2021-02-24 VITALS
HEIGHT: 63 IN | SYSTOLIC BLOOD PRESSURE: 106 MMHG | BODY MASS INDEX: 25.69 KG/M2 | DIASTOLIC BLOOD PRESSURE: 61 MMHG | HEART RATE: 82 BPM | WEIGHT: 145 LBS

## 2021-02-24 PROCEDURE — 99214 OFFICE O/P EST MOD 30 MIN: CPT | Performed by: PSYCHIATRY & NEUROLOGY

## 2021-02-24 ASSESSMENT — MIFFLIN-ST. JEOR: SCORE: 1261.85

## 2021-02-24 NOTE — PROCEDURES
NEUROLOGY PROCEDURE NOTE  Feb 24, 2021      Chronic migraine Botox injection    CONSENT: The procedure was explained to the patient. The risks and benefits of the procedure and the alternatives were discussed with the patient. The patient was given an opportunity to ask any questions she had about the procedure. A verbal consent was obtained from the patient. A written consent is available in the chart.    PROCEDURE SUMMARY:   The following muscles were identified after palpating for landmarks and the overlying skin was cleansed with alcohol. These muscles were then injected using a 30 guage- half  inch needle with the following doses of onabotulinumtoxin A. A 5 unit/ 0.1 ml dilution was used for the injections. A 2 x 100 unit vial was used.    1. Corrugators 5 units each side (not done).  2. Procerus 5 units (not done).  3. Frontalis 10 units each side (not done).  4. Temporalis 20 units each side.  5. Occipitalis 15 units each side.  6. Paraspinals 10 units each side.  7. Trapezius 15 units each side.    Botox is getting a blepharoplasty and will hold off injecting in the forehead.    Units Injected: 120 Units  Units Discarded: 80 Units  Lot Number and Expiration: [id number]; 10/2023    The patient tolerated the procedure without any immediate complaints and will call the Neurology clinic if she has any problems or side effects from the medication. The patient will follow up in the Neurology clinic as previously decided.      King Taylor MD  Neurologist  [facility]  [phone]

## 2021-02-24 NOTE — NURSING NOTE
Chief Complaint   Patient presents with     Botox     Botox is through buy and bill.     Gricelda Rowley CMA on 2/24/2021 at 7:59 AM

## 2021-02-24 NOTE — LETTER
2/24/2021         RE: Shyann Jurado  [address] [place] [id number]        Dear Colleague,    Thank you for referring your patient, Shyann Jurado, to the [facility] [place]. Please see a copy of my visit note below.    NEUROLOGY OUTPATIENT PROGRESS NOTE  Feb 24, 2021     CHIEF COMPLAINT/REASON FOR VISIT/REASON FOR CONSULT  Patient presents with:  Botox    REASON FOR CONSULTATION- Headaches    HISTORY OF PRESENT ILLNESS  Shyann Jurado is a 45 year old female seen today for headaches. She reports that her headache started about 2-1/2 years ago. They have always been sporadic once or twice a month. No clear triggers for the headaches have been identified. To be related to birth control and she was switched from Irma to Sarah. This did not significantly help the headaches. There is some neck tension involved which could be leading to the headaches. Headaches generally are behind the right eye with some sharp pain photophobia and phonophobia. She does have a history of a cerebral aneurysm identified on the MRI done for headaches. Headaches are thought to be unrelated to the aneurysm. She is currently followed by Dr. Álvarez who plans to do yearly MRI brain to evaluate for aneurysms. Imitrex 50 mg has been beneficial in the past. She is also been using Flexeril intermittently. There is some history of iron deficiency. She has difficulty sleeping and uses melatonin and Unisom. Tizanidine did not provide any benefit and patient was switched to Flexeril. She has had some hair loss side effects with the Topamax. Propanolol tried last time could not be tolerated because of side effects and she stopped the medication. Imitrex injections have also been prescribed for abortive therapy though she mainly uses the oral tablets.    Patient returns today reports that she is using 1 tablet of Flexeril every night. Headaches have significantly improved and she only has one headache in the last  6 weeks since I saw her. She used a combination of propanolol Flexeril Imitrex and caffeine as abortive therapy which worked well for her. Has gained some weight on the propanolol. Currently is off the propanolol on every day basis. Reports that her headache was at the time of the menstrual cycle. She is planning on seeing her OB/GYN may be change of birth control. Complains of some spotting. She is sleeping better with the Flexeril.    10/4/20  Patient returns today.  She reports that in the summer months she did not have any headaches no recently headaches have come back.  Headaches unchanged in nature.  The headache is still behind the right eye.  She wonders if the headaches might be related to her cycle being a bit off though is not completely sure what caused it.  There was some stress involved because her daughter tested positive for COVID though that should have made the headaches worse during summertime.  She did have one headache in September this started with right-sided neck tension and was slightly more severe than her baseline headaches.  She is using propanolol Flexeril Imitrex and caffeine for abortive therapy.  This combination seems to work for her.  Is not taking any preventive medication at this point.  Is using Flexeril to help with sleep.  Has rarely (about 2 times) needed to use the Imitrex injection.    She did receive a diagnostic angiogram and her aneurysm is stable.  This plans to do another angiogram in 1 year.    11/23/20  Patient returns today.  Reports that for the last 2 weeks she has been having a continuous headache.  She was prescribed nortriptyline when she had called the clinic which she has not tried so far.  She does not want to be on medications.  Is interested in getting the Botox done today.  She also gets cosmetic Botox for her forehead.  She reports this was a month ago when she got 10 units.  She thinks her some of her headaches might be hormonal in nature.  She continues  to use [place]ell and Imitrex for abortive therapy.  We talked about her being premenopause.  She is going to stop her birth control.  She is thinking about hysterectomy.  Her mother needed to get hysterectomy done.  We talked about that the migraines might get worse stopping the birth control or after menopause.  She does not need to get the hysterectomy just because of the headaches.  There is no clear evidence that the hysterectomy will help the headaches.  Headaches otherwise unchanged nature.  She has a few bad headaches since she was last seen.    She is sleeping well at this point with the Flexeril at nighttime.  No aneurysm symptoms.    2/24/21  Patient returns today.  Reports that she is having 2 headache days a month with the Botox.  Denies any side effects.  Feels that her neck is less tight with the Botox.  She has stopped the propanolol.  She is no longer using the Imitrex oral tablets.  She still needs to use injections and has to use that twice.  Nurtec has been working well for abortive therapy.  Reports no aneurysm-like symptoms  Scheduled for bilateral blepharoplasty surgery next week.  Does not want us injecting in the forehead.  Is using her cyclobenzaprine at night and on as-needed basis.  Has talked to her OB/GYN and feels that hysterectomy will make things worse and that is on hold for right now.    Previous history is reviewed and this is unchanged.    PAST MEDICAL/SURGICAL HISTORY  History reviewed. No pertinent past medical history.  Patient Active Problem List   Diagnosis     Vitamin D deficiency     Iron deficiency anemia     Insomnia     Hypothyroidism     History of colonic polyps     Fatigue     Chronic migraine     Brain aneurysm   Significant for high cholesterol, migraine headaches, depression, iron deficiency      FAMILY HISTORY  Family History   Problem Relation Age of Onset     Migraines Mother    Reviewed and negative for neurological conditions    SOCIAL HISTORY  Social History  "    Tobacco Use     Smoking status: Never Smoker     Smokeless tobacco: Never Used   Substance Use Topics     Alcohol use: Not Currently     Drug use: None       SYSTEMS REVIEW  Twelve-system ROS was done and other than the HPI this was negative.     MEDICATIONS  cyclobenzaprine (FLEXERIL) 5 MG tablet, Take 1 tablet (5 mg) by mouth 3 times daily as needed for muscle spasms  drospirenone-ethinyl estradiol (ADRIÁN) 3-0.03 MG tablet,   SUMAtriptan (IMITREX) 50 MG tablet, Take 1 tablet (50 mg) by mouth at onset of headache for migraine  SUMAtriptan (IMITREX) 6 MG/0.5ML injection, Inject 0.5 mLs (6 mg) Subcutaneous as needed for migraine (Headache)  vitamin D2 (ERGOCALCIFEROL) 18917 units (1250 mcg) capsule, TK 1 C PO WEEKLY    Botulinum Toxin Type A (BOTOX) 200 units injection 200 Units         PHYSICAL EXAMINATION  VITALS: /61   Pulse 82   Ht 1.6 m (5' 3\")   Wt 65.8 kg (145 lb)   BMI 25.69 kg/m    GENERAL: Healthy appearing, alert, no acute distress, normal habitus.  CARDIOVASCULAR: Ext warm and well perfused. Pulses present. NEUROLOGICAL: Patient is awake and oriented to self, place and time. Attention span is normal. Memory grossly intact. Language is fluent and follows commands appropriately. Appropriate fund of knowledge. Cranial nerves 2-12 are intact. There is no pronator drift. Motor exam shows 5/5 strength in all extremities. Tone is symmetric bilaterally in upper and lower extremities. Finger to nose is without dysmetria. Gait is normal and the patient is able to do tandem walk.    DIAGNOSTICS  DSA  Conically-shaped right internal carotid artery superior hypophyseal   aneurysm measures 1.3 mm deep by 1.7 mm x 2.1 mm across. The aneurysm neck   measures up to 2.1 mm in maximum dimension. There is very mild fusiform   ectasia of the paraclinoid segment   giving rise to this more focal saccular aneurysm.    DSA 2020 1. Stable appearance of the 1.3 x 1.7 x 2.1 mm right internal carotid artery superior " hypophyseal aneurysm with a 2.1 mm neck with adjacent mild parent vessel ectasia.    2. No evidence of new intracranial aneurysm.    Results were discussed with the patient and conveyed to the neurosurgical team immediately following the procedure.    Evaluation and stenosis determination based on NASCET criteria.    MRI  HEAD MRI:  1.  Negative brain MRI. No acute intracranial finding. No evidence for recent  ischemia, intracranial hemorrhage, or mass.     HEAD MRA:  1.  Approximately 2 mm outpouching in the region of the right carotid cave may  reflect a small aneurysm. This is near the dural rings and could be intradural  in location.  2.  Otherwise negative brain MRA.        RELEVANT LABS  Ferritin 67    IMPRESSION/REPORT/PLAN  Insomnia, unspecified type  Chronic migraine  Cerebral aneurysm, nonruptured  Blepharoplasty surgery  Neck tightness.  Question hormonal migraines    This is a 45 year old female chronic migraines, unrelated cerebral aneurysm, insomnia.  Headaches have improved with the Botox.  There might be a hormonal component as previously discussed with the patient.  She is using Nurtec and has stopped using Imitrex, Flexeril, propanolol, caffeine combination.  She still needs the Imitrex injections.  I did refill her Nurtec.  We will continue the Botox today.  We will not inject in the forehead since she is getting blepharoplasty surgery next week.    Cosmetic Botox and therapeutic Botox needs to be done the same day to prevent resistance.  She can use Flexeril at nighttime on as-needed basis for insomnia.    Further cerebral aneurysm she would need yearly scans though I think a MRA would be sufficient and she does not need a full diagnostic angiogram.  She is following up with neurosurgery regarding these aneurysms.    Her headaches might be hormonal and previously options were discussed with the patient.  She can further follow-up with her OB/GYN regarding this.    -     SUMAtriptan (IMITREX) 6  MG/0.5ML injection; Inject 0.5 mLs (6 mg) Subcutaneous as needed for migraine (Headache)  -     cyclobenzaprine (FLEXERIL) 5 MG tablet; Take 1 tablet (5 mg) by mouth 3 times daily as needed for muscle spasms  -     Rimegepant Sulfate 75 MG TBDP; Take 1 tablet by mouth as needed (Headaches)    Return in about 2 months (around 4/24/2021) for Virtual or clinic.    Over 30 minutes were spent coordinating the care for the patient on the day of the encounter.  This includes previsit, during visit and post visit activities as documented above.  (Activities include but not inclusive of reviewing chart, reviewing outside records, reviewing labs and imaging study results as well as the images, patient visit time including getting history and exam,  use if applicable, review of test results with the patient and coming up with a plan in a shared model, counseling patient and family, education and answering patient questions, EMR , EMR diagnosis entry and problem list management, medication reconciliation and prescription management if applicable, paperwork if applicable, printing documents and documentation of the visit activities.)  MDM: -2 chronic problems with prescription management      King Taylor MD  Neurologist  [facility]  Tel:- [phone]    This note was dictated using voice recognition software.  Any grammatical or context distortions are unintentional and inherent to the software.        Again, thank you for allowing me to participate in the care of your patient.        Sincerely,        King Taylor MD

## 2021-02-24 NOTE — PROGRESS NOTES
NEUROLOGY OUTPATIENT PROGRESS NOTE  Feb 24, 2021     CHIEF COMPLAINT/REASON FOR VISIT/REASON FOR CONSULT  Patient presents with:  Botox    REASON FOR CONSULTATION- Headaches    HISTORY OF PRESENT ILLNESS  Shyann Jurado is a 45 year old female seen today for headaches. She reports that her headache started about 2-1/2 years ago. They have always been sporadic once or twice a month. No clear triggers for the headaches have been identified. To be related to birth control and she was switched from Irma to Sarah. This did not significantly help the headaches. There is some neck tension involved which could be leading to the headaches. Headaches generally are behind the right eye with some sharp pain photophobia and phonophobia. She does have a history of a cerebral aneurysm identified on the MRI done for headaches. Headaches are thought to be unrelated to the aneurysm. She is currently followed by Dr. Álvarez who plans to do yearly MRI brain to evaluate for aneurysms. Imitrex 50 mg has been beneficial in the past. She is also been using Flexeril intermittently. There is some history of iron deficiency. She has difficulty sleeping and uses melatonin and Unisom. Tizanidine did not provide any benefit and patient was switched to Flexeril. She has had some hair loss side effects with the Topamax. Propanolol tried last time could not be tolerated because of side effects and she stopped the medication. Imitrex injections have also been prescribed for abortive therapy though she mainly uses the oral tablets.    Patient returns today reports that she is using 1 tablet of Flexeril every night. Headaches have significantly improved and she only has one headache in the last 6 weeks since I saw her. She used a combination of propanolol Flexeril Imitrex and caffeine as abortive therapy which worked well for her. Has gained some weight on the propanolol. Currently is off the propanolol on every day basis. Reports that her headache  was at the time of the menstrual cycle. She is planning on seeing her OB/GYN may be change of birth control. Complains of some spotting. She is sleeping better with the Flexeril.    10/4/20  Patient returns today.  She reports that in the summer months she did not have any headaches no recently headaches have come back.  Headaches unchanged in nature.  The headache is still behind the right eye.  She wonders if the headaches might be related to her cycle being a bit off though is not completely sure what caused it.  There was some stress involved because her daughter tested positive for COVID though that should have made the headaches worse during summertime.  She did have one headache in September this started with right-sided neck tension and was slightly more severe than her baseline headaches.  She is using propanolol Flexeril Imitrex and caffeine for abortive therapy.  This combination seems to work for her.  Is not taking any preventive medication at this point.  Is using Flexeril to help with sleep.  Has rarely (about 2 times) needed to use the Imitrex injection.    She did receive a diagnostic angiogram and her aneurysm is stable.  This plans to do another angiogram in 1 year.    11/23/20  Patient returns today.  Reports that for the last 2 weeks she has been having a continuous headache.  She was prescribed nortriptyline when she had called the clinic which she has not tried so far.  She does not want to be on medications.  Is interested in getting the Botox done today.  She also gets cosmetic Botox for her forehead.  She reports this was a month ago when she got 10 units.  She thinks her some of her headaches might be hormonal in nature.  She continues to use Le[place]ell and Imitrex for abortive therapy.  We talked about her being premenopause.  She is going to stop her birth control.  She is thinking about hysterectomy.  Her mother needed to get hysterectomy done.  We talked about that the migraines might get  worse stopping the birth control or after menopause.  She does not need to get the hysterectomy just because of the headaches.  There is no clear evidence that the hysterectomy will help the headaches.  Headaches otherwise unchanged nature.  She has a few bad headaches since she was last seen.    She is sleeping well at this point with the Flexeril at nighttime.  No aneurysm symptoms.    2/24/21  Patient returns today.  Reports that she is having 2 headache days a month with the Botox.  Denies any side effects.  Feels that her neck is less tight with the Botox.  She has stopped the propanolol.  She is no longer using the Imitrex oral tablets.  She still needs to use injections and has to use that twice.  Nurtec has been working well for abortive therapy.  Reports no aneurysm-like symptoms  Scheduled for bilateral blepharoplasty surgery next week.  Does not want us injecting in the forehead.  Is using her cyclobenzaprine at night and on as-needed basis.  Has talked to her OB/GYN and feels that hysterectomy will make things worse and that is on hold for right now.    Previous history is reviewed and this is unchanged.    PAST MEDICAL/SURGICAL HISTORY  History reviewed. No pertinent past medical history.  Patient Active Problem List   Diagnosis     Vitamin D deficiency     Iron deficiency anemia     Insomnia     Hypothyroidism     History of colonic polyps     Fatigue     Chronic migraine     Brain aneurysm   Significant for high cholesterol, migraine headaches, depression, iron deficiency      FAMILY HISTORY  Family History   Problem Relation Age of Onset     Migraines Mother    Reviewed and negative for neurological conditions    SOCIAL HISTORY  Social History     Tobacco Use     Smoking status: Never Smoker     Smokeless tobacco: Never Used   Substance Use Topics     Alcohol use: Not Currently     Drug use: None       SYSTEMS REVIEW  Twelve-system ROS was done and other than the HPI this was negative.  "    MEDICATIONS  cyclobenzaprine (FLEXERIL) 5 MG tablet, Take 1 tablet (5 mg) by mouth 3 times daily as needed for muscle spasms  drospirenone-ethinyl estradiol (ADRIÁN) 3-0.03 MG tablet,   SUMAtriptan (IMITREX) 50 MG tablet, Take 1 tablet (50 mg) by mouth at onset of headache for migraine  SUMAtriptan (IMITREX) 6 MG/0.5ML injection, Inject 0.5 mLs (6 mg) Subcutaneous as needed for migraine (Headache)  vitamin D2 (ERGOCALCIFEROL) 35898 units (1250 mcg) capsule, TK 1 C PO WEEKLY    Botulinum Toxin Type A (BOTOX) 200 units injection 200 Units         PHYSICAL EXAMINATION  VITALS: /61   Pulse 82   Ht 1.6 m (5' 3\")   Wt 65.8 kg (145 lb)   BMI 25.69 kg/m    GENERAL: Healthy appearing, alert, no acute distress, normal habitus.  CARDIOVASCULAR: Ext warm and well perfused. Pulses present. NEUROLOGICAL: Patient is awake and oriented to self, place and time. Attention span is normal. Memory grossly intact. Language is fluent and follows commands appropriately. Appropriate fund of knowledge. Cranial nerves 2-12 are intact. There is no pronator drift. Motor exam shows 5/5 strength in all extremities. Tone is symmetric bilaterally in upper and lower extremities. Finger to nose is without dysmetria. Gait is normal and the patient is able to do tandem walk.    DIAGNOSTICS  DSA  Conically-shaped right internal carotid artery superior hypophyseal   aneurysm measures 1.3 mm deep by 1.7 mm x 2.1 mm across. The aneurysm neck   measures up to 2.1 mm in maximum dimension. There is very mild fusiform   ectasia of the paraclinoid segment   giving rise to this more focal saccular aneurysm.    DSA 2020  1. 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    2. No evidence of new intracranial aneurysm.    Results were discussed with the patient and conveyed to the neurosurgical team immediately following the " procedure.    Evaluation and stenosis determination based on NASCET criteria.    MRI  HEAD MRI:  1.  Negative brain MRI. No acute intracranial finding. No evidence for recent  ischemia, intracranial hemorrhage, or mass.     HEAD MRA:  1.  Approximately 2 mm outpouching in the region of the right carotid cave may  reflect a small aneurysm. This is near the dural rings and could be intradural  in location.  2.  Otherwise negative brain MRA.        RELEVANT LABS  Ferritin 67    IMPRESSION/REPORT/PLAN  Insomnia, unspecified type  Chronic migraine  Cerebral aneurysm, nonruptured  Blepharoplasty surgery  Neck tightness.  Question hormonal migraines    This is a 45 year old female chronic migraines, unrelated cerebral aneurysm, insomnia.  Headaches have improved with the Botox.  There might be a hormonal component as previously discussed with the patient.  She is using Nurtec and has stopped using Imitrex, Flexeril, propanolol, caffeine combination.  She still needs the Imitrex injections.  I did refill her Nurtec.  We will continue the Botox today.  We will not inject in the forehead since she is getting blepharoplasty surgery next week.    Cosmetic Botox and therapeutic Botox needs to be done the same day to prevent resistance.  She can use Flexeril at nighttime on as-needed basis for insomnia.    Further cerebral aneurysm she would need yearly scans though I think a MRA would be sufficient and she does not need a full diagnostic angiogram.  She is following up with neurosurgery regarding these aneurysms.    Her headaches might be hormonal and previously options were discussed with the patient.  She can further follow-up with her OB/GYN regarding this.    -     SUMAtriptan (IMITREX) 6 MG/0.5ML injection; Inject 0.5 mLs (6 mg) Subcutaneous as needed for migraine (Headache)  -     cyclobenzaprine (FLEXERIL) 5 MG tablet; Take 1 tablet (5 mg) by mouth 3 times daily as needed for muscle spasms  -     Rimegepant Sulfate 75 MG  TBDP; Take 1 tablet by mouth as needed (Headaches)    Return in about 2 months (around 4/24/2021) for Virtual or clinic.    Over 30 minutes were spent coordinating the care for the patient on the day of the encounter.  This includes previsit, during visit and post visit activities as documented above.  (Activities include but not inclusive of reviewing chart, reviewing outside records, reviewing labs and imaging study results as well as the images, patient visit time including getting history and exam,  use if applicable, review of test results with the patient and coming up with a plan in a shared model, counseling patient and family, education and answering patient questions, EMR , EMR diagnosis entry and problem list management, medication reconciliation and prescription management if applicable, paperwork if applicable, printing documents and documentation of the visit activities.)  MDM: -2 chronic problems with prescription management      King Taylor MD  Neurologist  [facility]  Tel:- [phone]    This note was dictated using voice recognition software.  Any grammatical or context distortions are unintentional and inherent to the software.

## 2021-02-26 ENCOUNTER — COMMUNICATION - HEALTHEAST (OUTPATIENT)
Dept: ONCOLOGY | Facility: HOSPITAL | Age: 46
End: 2021-02-26

## 2021-02-26 ENCOUNTER — AMBULATORY - HEALTHEAST (OUTPATIENT)
Dept: LAB | Facility: CLINIC | Age: 46
End: 2021-02-26

## 2021-02-26 DIAGNOSIS — Z11.59 ENCOUNTER FOR SCREENING FOR OTHER VIRAL DISEASES: ICD-10-CM

## 2021-02-27 LAB
SARS-COV-2 PCR COMMENT: NORMAL
SARS-COV-2 RNA SPEC QL NAA+PROBE: NEGATIVE
SARS-COV-2 VIRUS SPECIMEN SOURCE: NORMAL

## 2021-02-27 RX ORDER — IBUPROFEN 800 MG/1
800 TABLET, FILM COATED ORAL EVERY 8 HOURS PRN
COMMUNITY

## 2021-02-27 RX ORDER — LANOLIN ALCOHOL/MO/W.PET/CERES
1 CREAM (GRAM) TOPICAL
COMMUNITY

## 2021-02-28 ENCOUNTER — COMMUNICATION - HEALTHEAST (OUTPATIENT)
Dept: SCHEDULING | Facility: CLINIC | Age: 46
End: 2021-02-28

## 2021-03-01 ENCOUNTER — COMMUNICATION - HEALTHEAST (OUTPATIENT)
Dept: SCHEDULING | Facility: CLINIC | Age: 46
End: 2021-03-01

## 2021-03-01 ENCOUNTER — ANESTHESIA EVENT (OUTPATIENT)
Dept: SURGERY | Facility: CLINIC | Age: 46
End: 2021-03-01
Payer: COMMERCIAL

## 2021-03-01 NOTE — ANESTHESIA PREPROCEDURE EVALUATION
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   Allergies   Allergen Reactions     Erythromycin Nausea and Vomiting and Other (See Comments)     Comment: Stomach Pain, Description:        Topiramate      Hair loss      Social History     Tobacco Use     Smoking status: Never Smoker     Smokeless tobacco: Never Used   Substance Use Topics     Alcohol use: Yes     Comment: 1-2/ month      Wt Readings from Last 1 Encounters:   21 65.8 kg (145 lb)        Anesthesia Evaluation   Pt has had prior anesthetic.     History of anesthetic complications   awareness under anesthesia .    ROS/MED HX  ENT/Pulmonary:    (-) asthma and sleep apnea   Neurologic: Comment: 1. 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    2. No evidence of new intracranial aneurysm.    Results were discussed with the patient and conveyed to the neurosurgical team immediately following the procedure.    Evaluation and stenosis determination based on NASCET criteria.  _____________________________________________  Emigdio Crooks M.D.  Interventional Neuroradiologist  [facility]  Office: [phone]  Pager: [phone]  Cell: [phone]    CPT codes included for physician reference only: 75629 (50 modifier), 15760 (50 modifier), 53774, 99153 x1  Result  Narrative  [facility]  INTERVENTIONAL NEURORADIOLOGY  5/29/2020 1:42 PM        (+) migraines,     Cardiovascular:     (+) Dyslipidemia -----    METS/Exercise Tolerance: >4 METS    Hematologic:       Musculoskeletal:       GI/Hepatic:    (-) GERD   Renal/Genitourinary:       Endo:     (+) thyroid problem,     Psychiatric/Substance Use:     (+) psychiatric history depression     Infectious Disease:       Malignancy:       Other:            Physical Exam    Airway        Mallampati: II   TM distance: > 3 FB   Neck ROM: full   Mouth opening: > 3 cm    Respiratory Devices and Support         Dental       (+) caps      Cardiovascular          Rhythm and rate: regular     Pulmonary           breath sounds clear to auscultation           OUTSIDE LABS:  CBC: No results found for: WBC, HGB, HCT, PLT  BMP: No results found for: NA, POTASSIUM, CHLORIDE, CO2, BUN, CR, GLC  COAGS: No results found for: PTT, INR, FIBR  POC: No results found for: BGM, HCG, HCGS  HEPATIC: No results found for: ALBUMIN, PROTTOTAL, ALT, AST, GGT, ALKPHOS, BILITOTAL, BILIDIRECT, MARY  OTHER: No results found for: PH, LACT, A1C, DEMETRIUS, PHOS, MAG, LIPASE, AMYLASE, TSH, T4, T3, CRP, SED    Anesthesia Plan    ASA Status:  2      Anesthesia Type: General.     - Airway: LMA              Consents    Anesthesia Plan(s) and associated risks, benefits, and realistic alternatives discussed. Questions answered and patient/representative(s) expressed understanding.     - Discussed with:  Patient         Postoperative Care       PONV prophylaxis: Ondansetron (or other 5HT-3), Dexamethasone or Solumedrol     Comments:    Primary propofol anesthesia             Julia Moody MD

## 2021-03-02 ENCOUNTER — HOSPITAL ENCOUNTER (OUTPATIENT)
Facility: CLINIC | Age: 46
Discharge: HOME | End: 2021-03-02
Attending: OPHTHALMOLOGY | Admitting: OPHTHALMOLOGY
Payer: COMMERCIAL

## 2021-03-02 ENCOUNTER — ANESTHESIA (OUTPATIENT)
Dept: SURGERY | Facility: CLINIC | Age: 46
End: 2021-03-02
Payer: COMMERCIAL

## 2021-03-02 VITALS
SYSTOLIC BLOOD PRESSURE: 110 MMHG | DIASTOLIC BLOOD PRESSURE: 76 MMHG | RESPIRATION RATE: 12 BRPM | BODY MASS INDEX: 26.58 KG/M2 | WEIGHT: 150 LBS | HEIGHT: 63 IN | TEMPERATURE: 96.4 F | HEART RATE: 71 BPM | OXYGEN SATURATION: 100 %

## 2021-03-02 DIAGNOSIS — H57.813 BROW PTOSIS, BILATERAL: Primary | ICD-10-CM

## 2021-03-02 LAB — HCG UR QL: NEGATIVE

## 2021-03-02 PROCEDURE — 250N000011 HC RX IP 250 OP 636: Performed by: NURSE ANESTHETIST, CERTIFIED REGISTERED

## 2021-03-02 PROCEDURE — 710N000009 HC RECOVERY PHASE 1, LEVEL 1, PER MIN: Performed by: OPHTHALMOLOGY

## 2021-03-02 PROCEDURE — 250N000009 HC RX 250: Performed by: NURSE ANESTHETIST, CERTIFIED REGISTERED

## 2021-03-02 PROCEDURE — 250N000025 HC SEVOFLURANE, PER MIN: Performed by: OPHTHALMOLOGY

## 2021-03-02 PROCEDURE — 272N000001 HC OR GENERAL SUPPLY STERILE: Performed by: OPHTHALMOLOGY

## 2021-03-02 PROCEDURE — 250N000009 HC RX 250: Performed by: OPHTHALMOLOGY

## 2021-03-02 PROCEDURE — 370N000017 HC ANESTHESIA TECHNICAL FEE, PER MIN: Performed by: OPHTHALMOLOGY

## 2021-03-02 PROCEDURE — 258N000003 HC RX IP 258 OP 636: Performed by: NURSE ANESTHETIST, CERTIFIED REGISTERED

## 2021-03-02 PROCEDURE — 710N000012 HC RECOVERY PHASE 2, PER MINUTE: Performed by: OPHTHALMOLOGY

## 2021-03-02 PROCEDURE — 250N000011 HC RX IP 250 OP 636: Performed by: ANESTHESIOLOGY

## 2021-03-02 PROCEDURE — 360N000076 HC SURGERY LEVEL 3, PER MIN: Performed by: OPHTHALMOLOGY

## 2021-03-02 PROCEDURE — 81025 URINE PREGNANCY TEST: CPT | Performed by: ANESTHESIOLOGY

## 2021-03-02 PROCEDURE — 250N000013 HC RX MED GY IP 250 OP 250 PS 637: Performed by: OPHTHALMOLOGY

## 2021-03-02 PROCEDURE — 999N000141 HC STATISTIC PRE-PROCEDURE NURSING ASSESSMENT: Performed by: OPHTHALMOLOGY

## 2021-03-02 RX ORDER — NALOXONE HYDROCHLORIDE 0.4 MG/ML
0.4 INJECTION, SOLUTION INTRAMUSCULAR; INTRAVENOUS; SUBCUTANEOUS
Status: DISCONTINUED | OUTPATIENT
Start: 2021-03-02 | End: 2021-03-02 | Stop reason: HOSPADM

## 2021-03-02 RX ORDER — BUPIVACAINE HYDROCHLORIDE 5 MG/ML
INJECTION, SOLUTION EPIDURAL; INTRACAUDAL
Status: DISCONTINUED
Start: 2021-03-02 | End: 2021-03-02 | Stop reason: HOSPADM

## 2021-03-02 RX ORDER — MEPERIDINE HYDROCHLORIDE 25 MG/ML
12.5 INJECTION INTRAMUSCULAR; INTRAVENOUS; SUBCUTANEOUS
Status: DISCONTINUED | OUTPATIENT
Start: 2021-03-02 | End: 2021-03-02 | Stop reason: HOSPADM

## 2021-03-02 RX ORDER — SODIUM CHLORIDE, SODIUM LACTATE, POTASSIUM CHLORIDE, CALCIUM CHLORIDE 600; 310; 30; 20 MG/100ML; MG/100ML; MG/100ML; MG/100ML
INJECTION, SOLUTION INTRAVENOUS CONTINUOUS
Status: DISCONTINUED | OUTPATIENT
Start: 2021-03-02 | End: 2021-03-02 | Stop reason: HOSPADM

## 2021-03-02 RX ORDER — ERYTHROMYCIN 5 MG/G
OINTMENT OPHTHALMIC PRN
Status: DISCONTINUED | OUTPATIENT
Start: 2021-03-02 | End: 2021-03-02 | Stop reason: HOSPADM

## 2021-03-02 RX ORDER — NALOXONE HYDROCHLORIDE 0.4 MG/ML
0.2 INJECTION, SOLUTION INTRAMUSCULAR; INTRAVENOUS; SUBCUTANEOUS
Status: DISCONTINUED | OUTPATIENT
Start: 2021-03-02 | End: 2021-03-02 | Stop reason: HOSPADM

## 2021-03-02 RX ORDER — FENTANYL CITRATE 50 UG/ML
25-50 INJECTION, SOLUTION INTRAMUSCULAR; INTRAVENOUS
Status: DISCONTINUED | OUTPATIENT
Start: 2021-03-02 | End: 2021-03-02 | Stop reason: HOSPADM

## 2021-03-02 RX ORDER — HYDROMORPHONE HYDROCHLORIDE 1 MG/ML
.3-.5 INJECTION, SOLUTION INTRAMUSCULAR; INTRAVENOUS; SUBCUTANEOUS EVERY 10 MIN PRN
Status: DISCONTINUED | OUTPATIENT
Start: 2021-03-02 | End: 2021-03-02 | Stop reason: HOSPADM

## 2021-03-02 RX ORDER — ERYTHROMYCIN 5 MG/G
OINTMENT OPHTHALMIC
Status: DISCONTINUED
Start: 2021-03-02 | End: 2021-03-02 | Stop reason: HOSPADM

## 2021-03-02 RX ORDER — PROPOFOL 10 MG/ML
INJECTION, EMULSION INTRAVENOUS CONTINUOUS PRN
Status: DISCONTINUED | OUTPATIENT
Start: 2021-03-02 | End: 2021-03-02

## 2021-03-02 RX ORDER — SODIUM CHLORIDE, SODIUM LACTATE, POTASSIUM CHLORIDE, CALCIUM CHLORIDE 600; 310; 30; 20 MG/100ML; MG/100ML; MG/100ML; MG/100ML
INJECTION, SOLUTION INTRAVENOUS CONTINUOUS PRN
Status: DISCONTINUED | OUTPATIENT
Start: 2021-03-02 | End: 2021-03-02

## 2021-03-02 RX ORDER — TETRACAINE HYDROCHLORIDE 5 MG/ML
SOLUTION OPHTHALMIC
Status: DISCONTINUED
Start: 2021-03-02 | End: 2021-03-02 | Stop reason: HOSPADM

## 2021-03-02 RX ORDER — ONDANSETRON 2 MG/ML
4 INJECTION INTRAMUSCULAR; INTRAVENOUS EVERY 30 MIN PRN
Status: DISCONTINUED | OUTPATIENT
Start: 2021-03-02 | End: 2021-03-02 | Stop reason: HOSPADM

## 2021-03-02 RX ORDER — ONDANSETRON 2 MG/ML
INJECTION INTRAMUSCULAR; INTRAVENOUS PRN
Status: DISCONTINUED | OUTPATIENT
Start: 2021-03-02 | End: 2021-03-02

## 2021-03-02 RX ORDER — ACETAMINOPHEN 500 MG
500 TABLET ORAL EVERY 6 HOURS PRN
COMMUNITY
End: 2024-01-31

## 2021-03-02 RX ORDER — PROPOFOL 10 MG/ML
INJECTION, EMULSION INTRAVENOUS PRN
Status: DISCONTINUED | OUTPATIENT
Start: 2021-03-02 | End: 2021-03-02

## 2021-03-02 RX ORDER — ONDANSETRON 4 MG/1
4 TABLET, ORALLY DISINTEGRATING ORAL EVERY 30 MIN PRN
Status: DISCONTINUED | OUTPATIENT
Start: 2021-03-02 | End: 2021-03-02 | Stop reason: HOSPADM

## 2021-03-02 RX ORDER — ERYTHROMYCIN 5 MG/G
OINTMENT OPHTHALMIC 3 TIMES DAILY
Qty: 3.5 G | Refills: 1 | Status: SHIPPED | OUTPATIENT
Start: 2021-03-02 | End: 2021-09-23

## 2021-03-02 RX ORDER — DEXAMETHASONE SODIUM PHOSPHATE 4 MG/ML
INJECTION, SOLUTION INTRA-ARTICULAR; INTRALESIONAL; INTRAMUSCULAR; INTRAVENOUS; SOFT TISSUE PRN
Status: DISCONTINUED | OUTPATIENT
Start: 2021-03-02 | End: 2021-03-02

## 2021-03-02 RX ORDER — LIDOCAINE HYDROCHLORIDE 20 MG/ML
INJECTION, SOLUTION INFILTRATION; PERINEURAL PRN
Status: DISCONTINUED | OUTPATIENT
Start: 2021-03-02 | End: 2021-03-02

## 2021-03-02 RX ORDER — MAGNESIUM HYDROXIDE 1200 MG/15ML
LIQUID ORAL PRN
Status: DISCONTINUED | OUTPATIENT
Start: 2021-03-02 | End: 2021-03-02 | Stop reason: HOSPADM

## 2021-03-02 RX ADMIN — PHENYLEPHRINE HYDROCHLORIDE 100 MCG: 10 INJECTION INTRAVENOUS at 09:38

## 2021-03-02 RX ADMIN — LIDOCAINE HYDROCHLORIDE 20 MG: 20 INJECTION, SOLUTION INFILTRATION; PERINEURAL at 08:53

## 2021-03-02 RX ADMIN — ACETAMINOPHEN AND CODEINE PHOSPHATE 1 TABLET: 300; 30 TABLET ORAL at 11:05

## 2021-03-02 RX ADMIN — ONDANSETRON 4 MG: 2 INJECTION INTRAMUSCULAR; INTRAVENOUS at 09:18

## 2021-03-02 RX ADMIN — PHENYLEPHRINE HYDROCHLORIDE 50 MCG: 10 INJECTION INTRAVENOUS at 09:28

## 2021-03-02 RX ADMIN — FENTANYL CITRATE 50 MCG: 0.05 INJECTION, SOLUTION INTRAMUSCULAR; INTRAVENOUS at 10:54

## 2021-03-02 RX ADMIN — MIDAZOLAM 2 MG: 1 INJECTION INTRAMUSCULAR; INTRAVENOUS at 08:52

## 2021-03-02 RX ADMIN — SODIUM CHLORIDE, POTASSIUM CHLORIDE, SODIUM LACTATE AND CALCIUM CHLORIDE: 600; 310; 30; 20 INJECTION, SOLUTION INTRAVENOUS at 08:51

## 2021-03-02 RX ADMIN — PROPOFOL 200 MCG/KG/MIN: 10 INJECTION, EMULSION INTRAVENOUS at 08:56

## 2021-03-02 RX ADMIN — DEXAMETHASONE SODIUM PHOSPHATE 4 MG: 4 INJECTION, SOLUTION INTRA-ARTICULAR; INTRALESIONAL; INTRAMUSCULAR; INTRAVENOUS; SOFT TISSUE at 09:39

## 2021-03-02 RX ADMIN — PHENYLEPHRINE HYDROCHLORIDE 100 MCG: 10 INJECTION INTRAVENOUS at 09:46

## 2021-03-02 RX ADMIN — PROPOFOL 200 MG: 10 INJECTION, EMULSION INTRAVENOUS at 08:53

## 2021-03-02 ASSESSMENT — MIFFLIN-ST. JEOR: SCORE: 1276.59

## 2021-03-02 NOTE — DISCHARGE INSTRUCTIONS
Same Day Surgery Discharge Instructions for  Sedation and General Anesthesia       It's not unusual to feel dizzy, light-headed or faint for up to 24 hours after surgery or while taking pain medication.  If you have these symptoms: sit for a few minutes before standing and have someone assist you when you get up to walk or use the bathroom.      You should rest and relax for the next 24 hours. We recommend you make arrangements to have an adult stay with you for at least 24 hours after your discharge.  Avoid hazardous and strenuous activity.      DO NOT DRIVE any vehicle or operate mechanical equipment for 24 hours following the end of your surgery.  Even though you may feel normal, your reactions may be affected by the medication you have received.      Do not drink alcoholic beverages for 24 hours following surgery.       Slowly progress to your regular diet as you feel able. It's not unusual to feel nauseated and/or vomit after receiving anesthesia.  If you develop these symptoms, drink clear liquids (apple juice, ginger ale, broth, 7-up, etc. ) until you feel better.  If your nausea and vomiting persists for 24 hours, please notify your surgeon.        All narcotic pain medications, along with inactivity and anesthesia, can cause constipation. Drinking plenty of liquids and increasing fiber intake will help.      For any questions of a medical nature, call your surgeon.      Do not make important decisions for 24 hours.      If you had general anesthesia, you may have a sore throat for a couple of days related to the breathing tube used during surgery.  You may use Cepacol lozenges to help with this discomfort.  If it worsens or if you develop a fever, contact your surgeon.       If you feel your pain is not well managed with the pain medications prescribed by your surgeon, please contact your surgeon's office to let them know so they can address your concerns.       CoVid 19 Information    We want to give you  information regarding Covid. Please consult your primary care provider with any questions you might have.     Patient who have symptoms (cough, fever, or shortness of breath), need to isolate for 7 days from when symptoms started OR 72 hours after fever resolves (without fever reducing medications) AND improvement of respiratory symptoms (whichever is longer).      Isolate yourself at home (in own room/own bathroom if possible)    Do Not allow any visitors    Do Not go to work or school    Do Not go to [employer],  centers, shopping, or other public places.    Do Not shake hands.    Avoid close and intimate contact with others (hugging, kissing).    Follow CDC recommendations for household cleaning of frequently touched services.     After the initial 7 days, continue to isolate yourself from household members as much as possible. To continue decrease the risk of community spread and exposure, you and any members of your household should limit activities in public for 14 days after starting home isolation.     You can reference the following CDC link for helpful home isolation/care tips:  https://www.cdc.gov/coronavirus/2019-ncov/downloads/10Things.pdf    Protect Others:    Cover Your Mouth and Nose with a mask, disposable tissue or wash cloth to avoid spreading germs to others.    Wash your hands and face frequently with soap and water    Call Your Primary Doctor If: Breathing difficulty develops or you become worse.    For more information about COVID19 and options for caring for yourself at home, please visit the CDC website at https://www.cdc.gov/coronavirus/2019-ncov/about/steps-when-sick.html  For more options for care at [facility], please visit our website at [email or web address]          [facility]   Eyelid/Orbital Surgery Discharge Instructions  Dr. Chayo Peña     ICE COMPRESSES  Immediately following surgery, you should begin to apply ice  compresses.  Apply a cold gel pack or wrap a clean washcloth around a cup of crushed ice in a plastic bag (a bag of frozen peas also works well) and hold the cold compresses directly against the closed eyelid (s).Apply cold pack for a minimum of six times daily for no longer than 15 minutes at a time. Continue cold compresses every day until the bruising and swelling begin to subside.  This can vary for each patient, but three days may be common.    HOT COMPRESSES  After your swelling and bruising have begun to subside, hot compresses should be applied.  Take a clean washcloth and wring it out in hot water (as warm as you can tolerate comfortably).  Hold this warm compress against the closed eyelid(s) at least six times per day for 15 minutes.  This should be continued for about two weeks.    OINTMENT  You may be given some ointment when you leave the hospital.  Apply this ointment as directed per pharmacy label for 7 days.  Expect some blurring of vision from the ointment.     ACTIVITY  Avoid heavy lifting or vigorous exercise for one week after surgery.  You may resume regular activities as tolerated.  You may shower and wash your hair on the day after surgery; be careful to avoid soaking the wounds or getting shampoo in your eyes. While your eyes are still swollen, it is recommended you sleep on your back and elevate your head with 2-3 pillows.    MEDICATION  If the doctor has given you some medications to take after surgery, please take these according to the instructions on the bottle.  Pain medications may make you drowsy so do not drive, operate heavy machinery, or use alcohol while taking it.  When you feel that you do not need the prescription pain medication, you may substitute Extra Strength Tylenol for mild pain by also following the directions on the bottle.    If you were taking Aspirin prior to your surgery, you may resume this medication tomorrow.    If you were on an anticoagulant, you may resume  taking it with the next scheduled dose.      WHAT TO EXPECT  You should expect some slight oozing of blood from the incision site over the first two to three days after surgery.  Swelling and bruising will occur for one to two weeks or longer.  You may also experience itching and tearing during the first several weeks after surgery.  This is part of the normal healing process    QUESTIONS  Please feel free to contact the office, should you have any questions that are not answered above.  The phone number is [phone].  Please call immediately if you are unable to establish vision in the operative eye, you are experiencing heavy bleeding that will not stop with gentle pressure or you have any signs of an infection (greenish/yellow discharge or progressive redness).    [facility]  [address]  [place], [place] [address] [phone]    **If you have questions or concerns about your procedure,  call Dr. Peña at [phone]**

## 2021-03-02 NOTE — ANESTHESIA CARE TRANSFER NOTE
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    Anesthesia Type:   General     Note:    Oropharynx: oropharynx clear of all foreign objects  Level of Consciousness: drowsy  Oxygen Supplementation: nasal cannula  Level of Supplemental Oxygen (L/min / FiO2): 2  Independent Airway: airway patency satisfactory and stable  Dentition: dentition unchanged  Vital Signs Stable: post-procedure vital signs reviewed and stable  Report to RN Given: handoff report given  Patient transferred to: PACU    Handoff Report: Identifed the Patient, Identified the Reponsible Provider, Reviewed the pertinent medical history, Discussed the surgical course, Reviewed Intra-OP anesthesia mangement and issues during anesthesia, Set expectations for post-procedure period and Allowed opportunity for questions and acknowledgement of understanding      Vitals: (Last set prior to Anesthesia Care Transfer)  CRNA VITALS  3/2/2021 0947 - 3/2/2021 1028      3/2/2021             Pulse:  87    SpO2:  100 %    Resp Rate (observed):  15        Electronically Signed By: MARINA Lui CRNA  March 2, 2021  10:28 AM

## 2021-03-02 NOTE — OP NOTE
Pre Operative Diagnosis:   1. Mechanical ptosis related to brow ptosis  2. Mechanical ptosis related to dermatochalasis  3. Right uper eyelid ptosis (involutional ptosis) (please with result from upneeq)      Post-operative Diagnosis: Same as above      Procedure(s):   1. Brow ptosis repair (scalp incision temporal browplasty)  2. Upper eyelid blepharoplasty  3. Internal ptosis repair (MMCR 8.0 right)    Surgeon: Chayo Peña MD      Anesthesia: General anesthesia care with local anesthetic      Blood loss: 5 cc    Complications: None      Specimens: None      Drains: None    Findings: See operative report     Operative Procedure:    The patient has bilateral upper dermatochalasis, upper eyelid ptosis as well as mild to moderate temporal brow ptosis. After the sites were marked and informed consent obtained, the patient was brought to the operating room.  Anesthesia was administered.  The conjoint tendon, upper eyelid creases, and supraorbital notches had been marked pre-operatively.  Blepharoplasty incisions were also marked with a surgical marking pen and calipers with care taken to leave adequate anterior lamella to prevent post operatively lagophthalmos. Scalp incisions (approximately at the hairline) straddling the conjoint tendon were also marked bilaterally. Local anesthetic was injected into the planned incision lines and upper eyelids and forehead.     An incision was made along the blepharoplasty markings using a #15 blade, and the monopolar cautery was used to excise a flap of skin and orbicularis. The temporal orbicularis was excised as this has a tendency to pull down the temporal brow. The same was performed on the opposite side. Dissection was then carried out between the orbicularis muscle and the orbital septum to the superior orbital rim, temporally. Dissection was carried out along the surface of the orbital septum superiorly, beneath the brow fat. The periosteum was then incised. The freer  periosteal elevator was then used to elevate the periosteum superior to the superior orbital rim to provide release in this area. This was performed medial to the conjoint tendon with the periosteal elevator. The same dissection was then carried out on the opposite side between the orbicularis muscle and the orbital septum to the superior orbital rim, staying beneath the brow fat. The superior orbital rim was then identified and the needle tip cautery is used to make an incision through the periosteum. The freer periosteal elevator is then used to elevate the periosteum superior to the superior orbital rim, medial to the conjoint tendon.     Attention was then turned to the scalp marking.  An incision was made along the hairline incision in the area that straddles the conjoint tendon. Dissection was carried out with Adames and Metzenbaum scissors to the deep temporalis fascia. Dissection was carried out medial to the conjoint tendon to enter the subperiosteal space. The Metzenbaum scissors were then use to dissect along the surface of the deep temporalis fascia inferiorly toward the superior orbital rim. The conjoint tendon was then released between these two dissection planes staying beneath the branches of the facial nerve.  The same dissection was then performed on the opposite side. The incision was made with the 15 blade, and then dissection was carried out with Metzenbaum scissors bluntly to the deep temporalis fascia. Dissection was then carried out medial to the conjoint tendon to reach the subperiosteal space. The Metzenbaum scissors were then used to dissect along the surface of the deep temporalis fascia. This provides release of the conjoint tendon as well as the attachments of the brow to the superior orbital rim. A subperiosteal plane was also creased along the forehead medial the conjoint tendon each side.     A 4-0 Vicryl suture was then used to engage the superficial temporalis fascia inferiorly.  This then engaged the deep temporalis fascia superiorly. Tying this suture elevated the brow. The same was performed on the other side.       Attention was then turned to the internal ptosis repair.  Attention was then directed to the internal ptosis repair. A 4-0 silk suture was placed through the gray line of the upper eyelid.  The eyelid was everted over a Desmarres retractor.  The cautery was used to marlen 4.0 mm from the tarsal plate at the medial and lateral 1/3 of the tarsal plate.  The conjunctiva was elevated with two toothed forceps at the marks, and the Putterman clamp was placed to allow for a total of 8.0 mm of resection.  A 6-0 double armed suture was passed beneath the clamp from lateral to medial and reversed.  A #15 blade was used in a metal on metal fashion to excise the conjunctiva.  The suture were confirmed to be intact and were tied.  Hemostasis was confirmed. This was performed on the right.     The blepharoplasty incisions were then closed with a combination of interrupted and running 6-0 Prolene sutures.  The scalp incisions were closed with  5-0 Prolene in a running fashion. At the conclusion of the case, the brows appear to be in good position. Antibiotic ointment is placed over the incisions.The patient will return in one week for suture removal. The patient was extubated and transferred to recovery in stable condition.      Chayo Peña

## 2021-03-18 ENCOUNTER — COMMUNICATION - HEALTHEAST (OUTPATIENT)
Dept: NEUROSURGERY | Facility: CLINIC | Age: 46
End: 2021-03-18

## 2021-03-18 ENCOUNTER — AMBULATORY - HEALTHEAST (OUTPATIENT)
Dept: NEUROSURGERY | Facility: CLINIC | Age: 46
End: 2021-03-18

## 2021-03-18 DIAGNOSIS — I67.1 NONRUPTURED CEREBRAL ANEURYSM: ICD-10-CM

## 2021-03-19 ENCOUNTER — COMMUNICATION - HEALTHEAST (OUTPATIENT)
Dept: NEUROSURGERY | Facility: CLINIC | Age: 46
End: 2021-03-19

## 2021-03-21 ENCOUNTER — COMMUNICATION - HEALTHEAST (OUTPATIENT)
Dept: FAMILY MEDICINE | Facility: CLINIC | Age: 46
End: 2021-03-21

## 2021-03-24 ENCOUNTER — AMBULATORY - HEALTHEAST (OUTPATIENT)
Dept: NURSING | Facility: CLINIC | Age: 46
End: 2021-03-24

## 2021-03-25 ENCOUNTER — AMBULATORY - HEALTHEAST (OUTPATIENT)
Dept: INFUSION THERAPY | Facility: HOSPITAL | Age: 46
End: 2021-03-25

## 2021-03-25 DIAGNOSIS — D50.8 OTHER IRON DEFICIENCY ANEMIA: ICD-10-CM

## 2021-03-25 LAB
BASOPHILS # BLD AUTO: 0 THOU/UL (ref 0–0.2)
BASOPHILS NFR BLD AUTO: 1 % (ref 0–2)
EOSINOPHIL # BLD AUTO: 0.1 THOU/UL (ref 0–0.4)
EOSINOPHIL NFR BLD AUTO: 2 % (ref 0–6)
ERYTHROCYTE [DISTWIDTH] IN BLOOD BY AUTOMATED COUNT: 12.6 % (ref 11–14.5)
FERRITIN SERPL-MCNC: 666 NG/ML (ref 10–130)
HCT VFR BLD AUTO: 39.6 % (ref 35–47)
HGB BLD-MCNC: 12.7 G/DL (ref 12–16)
IMM GRANULOCYTES # BLD: 0.1 THOU/UL
IMM GRANULOCYTES NFR BLD: 1 %
LYMPHOCYTES # BLD AUTO: 2.2 THOU/UL (ref 0.8–4.4)
LYMPHOCYTES NFR BLD AUTO: 26 % (ref 20–40)
MCH RBC QN AUTO: 28.8 PG (ref 27–34)
MCHC RBC AUTO-ENTMCNC: 32.1 G/DL (ref 32–36)
MCV RBC AUTO: 90 FL (ref 80–100)
MONOCYTES # BLD AUTO: 0.8 THOU/UL (ref 0–0.9)
MONOCYTES NFR BLD AUTO: 9 % (ref 2–10)
NEUTROPHILS # BLD AUTO: 5.3 THOU/UL (ref 2–7.7)
NEUTROPHILS NFR BLD AUTO: 62 % (ref 50–70)
PLATELET # BLD AUTO: 234 THOU/UL (ref 140–440)
PMV BLD AUTO: 10.8 FL (ref 8.5–12.5)
RBC # BLD AUTO: 4.41 MILL/UL (ref 3.8–5.4)
WBC: 8.6 THOU/UL (ref 4–11)

## 2021-03-26 ENCOUNTER — COMMUNICATION - HEALTHEAST (OUTPATIENT)
Dept: ONCOLOGY | Facility: HOSPITAL | Age: 46
End: 2021-03-26

## 2021-04-14 ENCOUNTER — AMBULATORY - HEALTHEAST (OUTPATIENT)
Dept: NURSING | Facility: CLINIC | Age: 46
End: 2021-04-14

## 2021-04-17 ENCOUNTER — HEALTH MAINTENANCE LETTER (OUTPATIENT)
Age: 46
End: 2021-04-17

## 2021-04-20 ENCOUNTER — VIRTUAL VISIT (OUTPATIENT)
Dept: NEUROLOGY | Facility: CLINIC | Age: 46
End: 2021-04-20
Payer: COMMERCIAL

## 2021-04-20 VITALS — HEIGHT: 63 IN | BODY MASS INDEX: 25.52 KG/M2 | WEIGHT: 144 LBS

## 2021-04-20 DIAGNOSIS — G47.00 INSOMNIA, UNSPECIFIED TYPE: ICD-10-CM

## 2021-04-20 DIAGNOSIS — I67.1 CEREBRAL ANEURYSM, NONRUPTURED: ICD-10-CM

## 2021-04-20 PROCEDURE — 99214 OFFICE O/P EST MOD 30 MIN: CPT | Mod: 95 | Performed by: PSYCHIATRY & NEUROLOGY

## 2021-04-20 RX ORDER — CEFUROXIME AXETIL 250 MG/1
TABLET ORAL
COMMUNITY
Start: 2020-07-14 | End: 2022-09-07

## 2021-04-20 ASSESSMENT — MIFFLIN-ST. JEOR: SCORE: 1252.31

## 2021-04-20 NOTE — PROGRESS NOTES
"NEUROLOGY OUTPATIENT PROGRESS NOTE (VIDEO)  Apr 20, 2021     CHIEF COMPLAINT/REASON FOR VISIT/REASON FOR CONSULT  Patient presents with:  Migraines: Pt states migraines are better, no having migraines as frequent.  Video Visit: Nolviat/If difficulties call [phone]    REASON FOR CONSULTATION- Headaches    Video Visit Consent  Patient is being evaluated via a billable video visit. The patient has been notified of following:   \"This video visit will be conducted via a call between you and your physician/provider. We have found that certain health care needs can be provided without the need for an in-person physical exam. This service lets us provide the care you need with a video conversation. If a prescription is necessary we can send it directly to your pharmacy. If lab work is needed we can place an order for that and you can then stop by our lab to have the test done at a later time.  If during the course of the call the physician/provider feels a video visit is not appropriate, you will not be charged for this service.  Physician has received verbal consent for a Video Visit from the patient? YES  Patient would like the video invitation sent by: Email/DriverTech    Video Visit Details  Type of service: Video Visit  Video Start Time: 1:00  Video End Time (time video stopped): 1:15  Originating Location (pt. Location): Patient's Home  Distant Location (provider location): [facility] [place]   Mode of Communication: Video Conference via [employer] User [place]    HISTORY OF PRESENT ILLNESS  Shyann Jurado is a 46 year old female seen today for headaches. She reports that her headache started about 2-1/2 years ago. They have always been sporadic once or twice a month. No clear triggers for the headaches have been identified. To be related to birth control and she was switched from Irma to Sarah. This did not significantly help the headaches. There is some neck tension involved which could be leading to the " headaches. Headaches generally are behind the right eye with some sharp pain photophobia and phonophobia. She does have a history of a cerebral aneurysm identified on the MRI done for headaches. Headaches are thought to be unrelated to the aneurysm. She is currently followed by Dr. Álvarez who plans to do yearly MRI brain to evaluate for aneurysms. Imitrex 50 mg has been beneficial in the past. She is also been using Flexeril intermittently. There is some history of iron deficiency. She has difficulty sleeping and uses melatonin and Unisom. Tizanidine did not provide any benefit and patient was switched to Flexeril. She has had some hair loss side effects with the Topamax. Propanolol tried last time could not be tolerated because of side effects and she stopped the medication. Imitrex injections have also been prescribed for abortive therapy though she mainly uses the oral tablets.    Patient returns today reports that she is using 1 tablet of Flexeril every night. Headaches have significantly improved and she only has one headache in the last 6 weeks since I saw her. She used a combination of propanolol Flexeril Imitrex and caffeine as abortive therapy which worked well for her. Has gained some weight on the propanolol. Currently is off the propanolol on every day basis. Reports that her headache was at the time of the menstrual cycle. She is planning on seeing her OB/GYN may be change of birth control. Complains of some spotting. She is sleeping better with the Flexeril.    10/4/20  Patient returns today.  She reports that in the summer months she did not have any headaches no recently headaches have come back.  Headaches unchanged in nature.  The headache is still behind the right eye.  She wonders if the headaches might be related to her cycle being a bit off though is not completely sure what caused it.  There was some stress involved because her daughter tested positive for COVID though that should have made  the headaches worse during summertime.  She did have one headache in September this started with right-sided neck tension and was slightly more severe than her baseline headaches.  She is using propanolol Flexeril Imitrex and caffeine for abortive therapy.  This combination seems to work for her.  Is not taking any preventive medication at this point.  Is using Flexeril to help with sleep.  Has rarely (about 2 times) needed to use the Imitrex injection.    She did receive a diagnostic angiogram and her aneurysm is stable.  This plans to do another angiogram in 1 year.    11/23/20  Patient returns today.  Reports that for the last 2 weeks she has been having a continuous headache.  She was prescribed nortriptyline when she had called the clinic which she has not tried so far.  She does not want to be on medications.  Is interested in getting the Botox done today.  She also gets cosmetic Botox for her forehead.  She reports this was a month ago when she got 10 units.  She thinks her some of her headaches might be hormonal in nature.  She continues to use Leksell and Imitrex for abortive therapy.  We talked about her being premenopause.  She is going to stop her birth control.  She is thinking about hysterectomy.  Her mother needed to get hysterectomy done.  We talked about that the migraines might get worse stopping the birth control or after menopause.  She does not need to get the hysterectomy just because of the headaches.  There is no clear evidence that the hysterectomy will help the headaches.  Headaches otherwise unchanged nature.  She has a few bad headaches since she was last seen.    She is sleeping well at this point with the Flexeril at nighttime.  No aneurysm symptoms.    2/24/21  Patient returns today.  Reports that she is having 2 headache days a month with the Botox.  Denies any side effects.  Feels that her neck is less tight with the Botox.  She has stopped the propanolol.  She is no longer using the  Imitrex oral tablets.  She still needs to use injections and has to use that twice.  Nurtec has been working well for abortive therapy.  Reports no aneurysm-like symptoms  Scheduled for bilateral blepharoplasty surgery next week.  Does not want us injecting in the forehead.  Is using her cyclobenzaprine at night and on as-needed basis.  Has talked to her OB/GYN and feels that hysterectomy will make things worse and that is on hold for right now.    4/20/21  Patient returns today.  She reports one bad migraine and 3 minor headaches in the last 2 months.  Overall Botox has been working really well.  Reports no side effects.  She has had a blepharoplasty surgery reports some scalp numbness behind her hairline.  Nurtec works really well for abortive therapy.  Imitrex injections occasionally she will have to use for more severe headaches.  Reports no aneurysm symptoms.  Has MRI scheduled for May.  Flexeril has been used as needed.  She stopped the propanolol.  Denies any other hormonal issues.    Previous history is reviewed and this is unchanged.    PAST MEDICAL/SURGICAL HISTORY  Past Medical History:   Diagnosis Date     Brain aneurysm      Carotid artery aneurysm (H)      Chronic headaches      Fatigue      Hx of colonic polyps      Hypothyroid      Iron deficiency anemia      Ptosis of eyelid, bilateral      Vitamin D deficiency      Patient Active Problem List   Diagnosis     Vitamin D deficiency     Iron deficiency anemia     Insomnia     Hypothyroidism     History of colonic polyps     Fatigue     Chronic migraine     Brain aneurysm   Significant for high cholesterol, migraine headaches, depression, iron deficiency      FAMILY HISTORY  Family History   Problem Relation Age of Onset     Migraines Mother    Reviewed and negative for neurological conditions    SOCIAL HISTORY  Social History     Tobacco Use     Smoking status: Never Smoker     Smokeless tobacco: Never Used   Substance Use Topics     Alcohol use: Yes      Comment: rare     Drug use: Never       SYSTEMS REVIEW  Twelve-system ROS was done and other than the HPI this was negative.     MEDICATIONS  acetaminophen (TYLENOL) 500 MG tablet, Take 500 mg by mouth every 6 hours as needed for mild pain  cyclobenzaprine (FLEXERIL) 5 MG tablet, Take 1 tablet (5 mg) by mouth 3 times daily as needed for muscle spasms  doxylamine (UNISOM) 25 MG TABS tablet, Take 25 mg by mouth nightly as needed  drospirenone-ethinyl estradiol (ADRIÁN) 3-0.03 MG tablet, Take 1 tablet by mouth daily   erythromycin (ROMYCIN) 5 MG/GM ophthalmic ointment, Place into both eyes 3 times daily  ibuprofen (ADVIL/MOTRIN) 800 MG tablet, Take 800 mg by mouth every 8 hours as needed for moderate pain  melatonin 3 MG tablet, Take 1 mg by mouth nightly as needed for sleep  Rimegepant Sulfate 75 MG TBDP, Take 1 tablet by mouth as needed (Headaches)  SUMAtriptan (IMITREX STATDOSE) 6 MG/0.5ML pen injector kit,   SUMAtriptan (IMITREX) 50 MG tablet, Take 1 tablet (50 mg) by mouth at onset of headache for migraine  SUMAtriptan (IMITREX) 6 MG/0.5ML injection, Inject 0.5 mLs (6 mg) Subcutaneous as needed for migraine (Headache)  vitamin D2 (ERGOCALCIFEROL) 00066 units (1250 mcg) capsule, TK 1 C PO WEEKLY    Botulinum Toxin Type A (BOTOX) 200 units injection 200 Units         PHYSICAL EXAM  Exam was limited due to video encounter.    Vitals-Unable to do on video  GENERAL -Health appearing, No apparent distress  EYES- No scleral icterus, no eyelid droop, Pupils symmetric  HEENT - Normocephalic, atraumatic, Hearing grossly intact; Oral mucosa moist and pink in color. External Ears and nose intact.   Neck - soft/flexible with normal ROM on visual inspection.  PULM - Good spontaneous respiratory effort;  CV- No edema on visual inspection  MSK- Gait - see Neuro section; Strength and tone- see Neuro section; Range of motion grossly intact.  Psych- Normal mood and affect. Good judgment and insight.     Neurological  Mental  "status - Patient is awake and oriented. Attention span is normal. Language is fluent and follows commands appropriately.   Cranial nerves - Pupils are symmetric; EOMI, NLF symmetric  Motor - There is no pronator drift. Antigravity in all 4 ext.  Tone - No evidence of rigidity on visual inspection. No tremor.  Reflexes - Unable to do on video  Sensation - Unable to do on Video  Coordination - Finger to nose without dysmetria.   Gait and station - Romberg is negative. Gait is steady      PREVIOUS PHYSICAL EXAMINATION  VITALS: Ht 1.6 m (5' 3\")   Wt 65.3 kg (144 lb)   BMI 25.51 kg/m    GENERAL: Healthy appearing, alert, no acute distress, normal habitus.  CARDIOVASCULAR: Ext warm and well perfused. Pulses present. NEUROLOGICAL: Patient is awake and oriented to self, place and time. Attention span is normal. Memory grossly intact. Language is fluent and follows commands appropriately. Appropriate fund of knowledge. Cranial nerves 2-12 are intact. There is no pronator drift. Motor exam shows 5/5 strength in all extremities. Tone is symmetric bilaterally in upper and lower extremities. Finger to nose is without dysmetria. Gait is normal and the patient is able to do tandem walk.    DIAGNOSTICS  DSA  Conically-shaped right internal carotid artery superior hypophyseal   aneurysm measures 1.3 mm deep by 1.7 mm x 2.1 mm across. The aneurysm neck   measures up to 2.1 mm in maximum dimension. There is very mild fusiform   ectasia of the paraclinoid segment   giving rise to this more focal saccular aneurysm.    DSA 2020  1. 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    2. No evidence of new intracranial aneurysm.    Results were discussed with the patient and conveyed to the neurosurgical team immediately following the procedure.    Evaluation and stenosis determination based on NASCET criteria.    MRI  HEAD " MRI:  1.  Negative brain MRI. No acute intracranial finding. No evidence for recent  ischemia, intracranial hemorrhage, or mass.     HEAD MRA:  1.  Approximately 2 mm outpouching in the region of the right carotid cave may  reflect a small aneurysm. This is near the dural rings and could be intradural  in location.  2.  Otherwise negative brain MRA.        RELEVANT LABS  Ferritin 67    IMPRESSION/REPORT/PLAN  Insomnia, unspecified type  Chronic migraine  Cerebral aneurysm, nonruptured  Blepharoplasty surgery  Neck tightness.  Question hormonal migraines    This is a 46 year old female chronic migraines, unrelated cerebral aneurysm, insomnia.  Headaches have improved with the Botox.  There might be a hormonal component as previously discussed with the patient.  She is using Nurtec and has stopped using Imitrex, Flexeril, propanolol, caffeine combination.  She still needs the Imitrex injections.  I did refill her Nurtec.  We will continue the Botox during next visit since beneficial.  Could consider injecting the forehead, since she is already had a blepharoplasty surgery,  to see if she can get even more benefit, though I will let her decide.    Cosmetic Botox and therapeutic Botox needs to be done the same day to prevent resistance.  She can use Flexeril at nighttime on as-needed basis for insomnia and neck stiffness.    Further cerebral aneurysm she would need yearly scans though I think a MRA would be sufficient and she does not need a full diagnostic angiogram.  MRA is scheduled in May.  She is following up with neurosurgery regarding these aneurysms.    Her headaches might be hormonal and previously options were discussed with the patient.  She can further follow-up with her OB/GYN regarding this.    -     SUMAtriptan (IMITREX) 6 MG/0.5ML injection; Inject 0.5 mLs (6 mg) Subcutaneous as needed for migraine (Headache)  -     cyclobenzaprine (FLEXERIL) 5 MG tablet; Take 1 tablet (5 mg) by mouth 3 times daily as  needed for muscle spasms  -     Rimegepant Sulfate 75 MG TBDP; Take 1 tablet by mouth as needed (Headaches)    Return for 1 month Botox and 3 months meds, Virtual or clinic.    Over 30 minutes were spent coordinating the care for the patient on the day of the encounter.  This includes previsit, during visit and post visit activities as documented above.  (Activities include but not inclusive of reviewing chart, reviewing outside records, reviewing labs and imaging study results as well as the images, patient visit time including getting history and exam,  use if applicable, review of test results with the patient and coming up with a plan in a shared model, counseling patient and family, education and answering patient questions, EMR , EMR diagnosis entry and problem list management, medication reconciliation and prescription management if applicable, paperwork if applicable, printing documents and documentation of the visit activities.)  MDM: -2 chronic problems with prescription management      King Taylor MD  Neurologist  [facility]  Tel:- [phone]    This note was dictated using voice recognition software.  Any grammatical or context distortions are unintentional and inherent to the software.

## 2021-04-20 NOTE — NURSING NOTE
Chief Complaint   Patient presents with     Migraines     Pt states migraines are better, no having migraines as frequent.     Video Visit     Mychart/If difficulties call [phone]     Radha Khan LPN on 4/20/2021 at 12:50 PM

## 2021-04-20 NOTE — LETTER
"    4/20/2021         RE: Shyann Jurado  [address] [place] [id number]        Dear Colleague,    Thank you for referring your patient, Shyann Jurado, to the [facility] [place]. Please see a copy of my visit note below.    NEUROLOGY OUTPATIENT PROGRESS NOTE (VIDEO)  Apr 20, 2021     CHIEF COMPLAINT/REASON FOR VISIT/REASON FOR CONSULT  Patient presents with:  Migraines: Pt states migraines are better, no having migraines as frequent.  Video Visit: Mychart/If difficulties call [phone]    REASON FOR CONSULTATION- Headaches    Video Visit Consent  Patient is being evaluated via a billable video visit. The patient has been notified of following:   \"This video visit will be conducted via a call between you and your physician/provider. We have found that certain health care needs can be provided without the need for an in-person physical exam. This service lets us provide the care you need with a video conversation. If a prescription is necessary we can send it directly to your pharmacy. If lab work is needed we can place an order for that and you can then stop by our lab to have the test done at a later time.  If during the course of the call the physician/provider feels a video visit is not appropriate, you will not be charged for this service.  Physician has received verbal consent for a Video Visit from the patient? YES  Patient would like the video invitation sent by: Email/Waspit    Video Visit Details  Type of service: Video Visit  Video Start Time: 1:00  Video End Time (time video stopped): 1:15  Originating Location (pt. Location): Patient's Home  Distant Location (provider location): [facility] [place]   Mode of Communication: Video Conference via Genalyte    HISTORY OF PRESENT ILLNESS  Shyann Jurado is a 46 year old female seen today for headaches. She reports that her headache started about 2-1/2 years ago. They have always been sporadic once or " twice a month. No clear triggers for the headaches have been identified. To be related to birth control and she was switched from Irma to Sarah. This did not significantly help the headaches. There is some neck tension involved which could be leading to the headaches. Headaches generally are behind the right eye with some sharp pain photophobia and phonophobia. She does have a history of a cerebral aneurysm identified on the MRI done for headaches. Headaches are thought to be unrelated to the aneurysm. She is currently followed by Dr. Álvarez who plans to do yearly MRI brain to evaluate for aneurysms. Imitrex 50 mg has been beneficial in the past. She is also been using Flexeril intermittently. There is some history of iron deficiency. She has difficulty sleeping and uses melatonin and Unisom. Tizanidine did not provide any benefit and patient was switched to Flexeril. She has had some hair loss side effects with the Topamax. Propanolol tried last time could not be tolerated because of side effects and she stopped the medication. Imitrex injections have also been prescribed for abortive therapy though she mainly uses the oral tablets.    Patient returns today reports that she is using 1 tablet of Flexeril every night. Headaches have significantly improved and she only has one headache in the last 6 weeks since I saw her. She used a combination of propanolol Flexeril Imitrex and caffeine as abortive therapy which worked well for her. Has gained some weight on the propanolol. Currently is off the propanolol on every day basis. Reports that her headache was at the time of the menstrual cycle. She is planning on seeing her OB/GYN may be change of birth control. Complains of some spotting. She is sleeping better with the Flexeril.    10/4/20  Patient returns today.  She reports that in the summer months she did not have any headaches no recently headaches have come back.  Headaches unchanged in nature.  The headache is  still behind the right eye.  She wonders if the headaches might be related to her cycle being a bit off though is not completely sure what caused it.  There was some stress involved because her daughter tested positive for COVID though that should have made the headaches worse during summertime.  She did have one headache in September this started with right-sided neck tension and was slightly more severe than her baseline headaches.  She is using propanolol Flexeril Imitrex and caffeine for abortive therapy.  This combination seems to work for her.  Is not taking any preventive medication at this point.  Is using Flexeril to help with sleep.  Has rarely (about 2 times) needed to use the Imitrex injection.    She did receive a diagnostic angiogram and her aneurysm is stable.  This plans to do another angiogram in 1 year.    11/23/20  Patient returns today.  Reports that for the last 2 weeks she has been having a continuous headache.  She was prescribed nortriptyline when she had called the clinic which she has not tried so far.  She does not want to be on medications.  Is interested in getting the Botox done today.  She also gets cosmetic Botox for her forehead.  She reports this was a month ago when she got 10 units.  She thinks her some of her headaches might be hormonal in nature.  She continues to use Leksell and Imitrex for abortive therapy.  We talked about her being premenopause.  She is going to stop her birth control.  She is thinking about hysterectomy.  Her mother needed to get hysterectomy done.  We talked about that the migraines might get worse stopping the birth control or after menopause.  She does not need to get the hysterectomy just because of the headaches.  There is no clear evidence that the hysterectomy will help the headaches.  Headaches otherwise unchanged nature.  She has a few bad headaches since she was last seen.    She is sleeping well at this point with the Flexeril at nighttime.  No  aneurysm symptoms.    2/24/21  Patient returns today.  Reports that she is having 2 headache days a month with the Botox.  Denies any side effects.  Feels that her neck is less tight with the Botox.  She has stopped the propanolol.  She is no longer using the Imitrex oral tablets.  She still needs to use injections and has to use that twice.  Nurtec has been working well for abortive therapy.  Reports no aneurysm-like symptoms  Scheduled for bilateral blepharoplasty surgery next week.  Does not want us injecting in the forehead.  Is using her cyclobenzaprine at night and on as-needed basis.  Has talked to her OB/GYN and feels that hysterectomy will make things worse and that is on hold for right now.    4/20/21  Patient returns today.  She reports one bad migraine and 3 minor headaches in the last 2 months.  Overall Botox has been working really well.  Reports no side effects.  She has had a blepharoplasty surgery reports some scalp numbness behind her hairline.  Nurtec works really well for abortive therapy.  Imitrex injections occasionally she will have to use for more severe headaches.  Reports no aneurysm symptoms.  Has MRI scheduled for May.  Flexeril has been used as needed.  She stopped the propanolol.  Denies any other hormonal issues.    Previous history is reviewed and this is unchanged.    PAST MEDICAL/SURGICAL HISTORY  Past Medical History:   Diagnosis Date     Brain aneurysm      Carotid artery aneurysm (H)      Chronic headaches      Fatigue      Hx of colonic polyps      Hypothyroid      Iron deficiency anemia      Ptosis of eyelid, bilateral      Vitamin D deficiency      Patient Active Problem List   Diagnosis     Vitamin D deficiency     Iron deficiency anemia     Insomnia     Hypothyroidism     History of colonic polyps     Fatigue     Chronic migraine     Brain aneurysm   Significant for high cholesterol, migraine headaches, depression, iron deficiency      FAMILY HISTORY  Family History    Problem Relation Age of Onset     Migraines Mother    Reviewed and negative for neurological conditions    SOCIAL HISTORY  Social History     Tobacco Use     Smoking status: Never Smoker     Smokeless tobacco: Never Used   Substance Use Topics     Alcohol use: Yes     Comment: rare     Drug use: Never       SYSTEMS REVIEW  Twelve-system ROS was done and other than the HPI this was negative.     MEDICATIONS  acetaminophen (TYLENOL) 500 MG tablet, Take 500 mg by mouth every 6 hours as needed for mild pain  cyclobenzaprine (FLEXERIL) 5 MG tablet, Take 1 tablet (5 mg) by mouth 3 times daily as needed for muscle spasms  doxylamine (UNISOM) 25 MG TABS tablet, Take 25 mg by mouth nightly as needed  drospirenone-ethinyl estradiol (ADRIÁN) 3-0.03 MG tablet, Take 1 tablet by mouth daily   erythromycin (ROMYCIN) 5 MG/GM ophthalmic ointment, Place into both eyes 3 times daily  ibuprofen (ADVIL/MOTRIN) 800 MG tablet, Take 800 mg by mouth every 8 hours as needed for moderate pain  melatonin 3 MG tablet, Take 1 mg by mouth nightly as needed for sleep  Rimegepant Sulfate 75 MG TBDP, Take 1 tablet by mouth as needed (Headaches)  SUMAtriptan (IMITREX STATDOSE) 6 MG/0.5ML pen injector kit,   SUMAtriptan (IMITREX) 50 MG tablet, Take 1 tablet (50 mg) by mouth at onset of headache for migraine  SUMAtriptan (IMITREX) 6 MG/0.5ML injection, Inject 0.5 mLs (6 mg) Subcutaneous as needed for migraine (Headache)  vitamin D2 (ERGOCALCIFEROL) 45725 units (1250 mcg) capsule, TK 1 C PO WEEKLY    Botulinum Toxin Type A (BOTOX) 200 units injection 200 Units         PHYSICAL EXAM  Exam was limited due to video encounter.    Vitals-Unable to do on video  GENERAL -Health appearing, No apparent distress  EYES- No scleral icterus, no eyelid droop, Pupils symmetric  HEENT - Normocephalic, atraumatic, Hearing grossly intact; Oral mucosa moist and pink in color. External Ears and nose intact.   Neck - soft/flexible with normal ROM on visual  "inspection.  PULM - Good spontaneous respiratory effort;  CV- No edema on visual inspection  MSK- Gait - see Neuro section; Strength and tone- see Neuro section; Range of motion grossly intact.  Psych- Normal mood and affect. Good judgment and insight.     Neurological  Mental status - Patient is awake and oriented. Attention span is normal. Language is fluent and follows commands appropriately.   Cranial nerves - Pupils are symmetric; EOMI, NLF symmetric  Motor - There is no pronator drift. Antigravity in all 4 ext.  Tone - No evidence of rigidity on visual inspection. No tremor.  Reflexes - Unable to do on video  Sensation - Unable to do on Video  Coordination - Finger to nose without dysmetria.   Gait and station - Romberg is negative. Gait is steady      PREVIOUS PHYSICAL EXAMINATION  VITALS: Ht 1.6 m (5' 3\")   Wt 65.3 kg (144 lb)   BMI 25.51 kg/m    GENERAL: Healthy appearing, alert, no acute distress, normal habitus.  CARDIOVASCULAR: Ext warm and well perfused. Pulses present. NEUROLOGICAL: Patient is awake and oriented to self, place and time. Attention span is normal. Memory grossly intact. Language is fluent and follows commands appropriately. Appropriate fund of knowledge. Cranial nerves 2-12 are intact. There is no pronator drift. Motor exam shows 5/5 strength in all extremities. Tone is symmetric bilaterally in upper and lower extremities. Finger to nose is without dysmetria. Gait is normal and the patient is able to do tandem walk.    DIAGNOSTICS  DSA  Conically-shaped right internal carotid artery superior hypophyseal   aneurysm measures 1.3 mm deep by 1.7 mm x 2.1 mm across. The aneurysm neck   measures up to 2.1 mm in maximum dimension. There is very mild fusiform   ectasia of the paraclinoid segment   giving rise to this more focal saccular aneurysm.    DSA 2020  1. Stable appearance of the 1.3 x 1.7 x 2.1 mm right internal carotid artery superior hypophyseal aneurysm with a 2.1 mm neck with " adjacent mild parent vessel ectasia.    2. No evidence of new intracranial aneurysm.    Results were discussed with the patient and conveyed to the neurosurgical team immediately following the procedure.    Evaluation and stenosis determination based on NASCET criteria.    MRI  HEAD MRI:  1.  Negative brain MRI. No acute intracranial finding. No evidence for recent  ischemia, intracranial hemorrhage, or mass.     HEAD MRA:  1.  Approximately 2 mm outpouching in the region of the right carotid cave may  reflect a small aneurysm. This is near the dural rings and could be intradural  in location.  2.  Otherwise negative brain MRA.        RELEVANT LABS  Ferritin 67    IMPRESSION/REPORT/PLAN  Insomnia, unspecified type  Chronic migraine  Cerebral aneurysm, nonruptured  Blepharoplasty surgery  Neck tightness.  Question hormonal migraines    This is a 46 year old female chronic migraines, unrelated cerebral aneurysm, insomnia.  Headaches have improved with the Botox.  There might be a hormonal component as previously discussed with the patient.  She is using Nurtec and has stopped using Imitrex, Flexeril, propanolol, caffeine combination.  She still needs the Imitrex injections.  I did refill her Nurtec.  We will continue the Botox during next visit since beneficial.  Could consider injecting the forehead, since she is already had a blepharoplasty surgery,  to see if she can get even more benefit, though I will let her decide.    Cosmetic Botox and therapeutic Botox needs to be done the same day to prevent resistance.  She can use Flexeril at nighttime on as-needed basis for insomnia and neck stiffness.    Further cerebral aneurysm she would need yearly scans though I think a MRA would be sufficient and she does not need a full diagnostic angiogram.  MRA is scheduled in May.  She is following up with neurosurgery regarding these aneurysms.    Her headaches might be hormonal and previously options were discussed with the  patient.  She can further follow-up with her OB/GYN regarding this.    -     SUMAtriptan (IMITREX) 6 MG/0.5ML injection; Inject 0.5 mLs (6 mg) Subcutaneous as needed for migraine (Headache)  -     cyclobenzaprine (FLEXERIL) 5 MG tablet; Take 1 tablet (5 mg) by mouth 3 times daily as needed for muscle spasms  -     Rimegepant Sulfate 75 MG TBDP; Take 1 tablet by mouth as needed (Headaches)    Return for 1 month Botox and 3 months meds, Virtual or clinic.    Over 30 minutes were spent coordinating the care for the patient on the day of the encounter.  This includes previsit, during visit and post visit activities as documented above.  (Activities include but not inclusive of reviewing chart, reviewing outside records, reviewing labs and imaging study results as well as the images, patient visit time including getting history and exam,  use if applicable, review of test results with the patient and coming up with a plan in a shared model, counseling patient and family, education and answering patient questions, EMR , EMR diagnosis entry and problem list management, medication reconciliation and prescription management if applicable, paperwork if applicable, printing documents and documentation of the visit activities.)  MDM: -2 chronic problems with prescription management      King Taylor MD  Neurologist  [facility]  Tel:- [phone]    This note was dictated using voice recognition software.  Any grammatical or context distortions are unintentional and inherent to the software.        Again, thank you for allowing me to participate in the care of your patient.        Sincerely,        King Taylor MD

## 2021-05-05 ENCOUNTER — HOSPITAL ENCOUNTER (OUTPATIENT)
Dept: MRI IMAGING | Facility: HOSPITAL | Age: 46
Discharge: HOME | End: 2021-05-05
Attending: NEUROLOGICAL SURGERY
Payer: COMMERCIAL

## 2021-05-05 DIAGNOSIS — I67.1 NONRUPTURED CEREBRAL ANEURYSM: ICD-10-CM

## 2021-05-07 ENCOUNTER — OFFICE VISIT - HEALTHEAST (OUTPATIENT)
Dept: NEUROSURGERY | Facility: CLINIC | Age: 46
End: 2021-05-07

## 2021-05-07 DIAGNOSIS — I67.1 NONRUPTURED CEREBRAL ANEURYSM: ICD-10-CM

## 2021-05-19 ENCOUNTER — OFFICE VISIT - HEALTHEAST (OUTPATIENT)
Dept: FAMILY MEDICINE | Facility: CLINIC | Age: 46
End: 2021-05-19

## 2021-05-19 DIAGNOSIS — E03.9 HYPOTHYROIDISM, UNSPECIFIED TYPE: ICD-10-CM

## 2021-05-19 DIAGNOSIS — D50.8 OTHER IRON DEFICIENCY ANEMIA: ICD-10-CM

## 2021-05-19 DIAGNOSIS — E55.9 VITAMIN D DEFICIENCY: ICD-10-CM

## 2021-05-19 DIAGNOSIS — I67.1 BRAIN ANEURYSM: ICD-10-CM

## 2021-05-19 DIAGNOSIS — Z00.00 ROUTINE GENERAL MEDICAL EXAMINATION AT A HEALTH CARE FACILITY: ICD-10-CM

## 2021-05-19 DIAGNOSIS — Z86.0100 HISTORY OF COLONIC POLYPS: ICD-10-CM

## 2021-05-19 LAB
ALBUMIN SERPL-MCNC: 3.9 G/DL (ref 3.5–5)
ALP SERPL-CCNC: 68 U/L (ref 45–120)
ALT SERPL W P-5'-P-CCNC: 20 U/L (ref 0–45)
ANION GAP SERPL CALCULATED.3IONS-SCNC: 13 MMOL/L (ref 5–18)
AST SERPL W P-5'-P-CCNC: 19 U/L (ref 0–40)
BILIRUB SERPL-MCNC: 0.6 MG/DL (ref 0–1)
BUN SERPL-MCNC: 14 MG/DL (ref 8–22)
CALCIUM SERPL-MCNC: 9.1 MG/DL (ref 8.5–10.5)
CHLORIDE BLD-SCNC: 104 MMOL/L (ref 98–107)
CHOLEST SERPL-MCNC: 214 MG/DL
CO2 SERPL-SCNC: 22 MMOL/L (ref 22–31)
CREAT SERPL-MCNC: 0.79 MG/DL (ref 0.6–1.1)
ERYTHROCYTE [DISTWIDTH] IN BLOOD BY AUTOMATED COUNT: 12.8 % (ref 11–14.5)
FASTING STATUS PATIENT QL REPORTED: YES
GFR SERPL CREATININE-BSD FRML MDRD: >60 ML/MIN/1.73M2
GLUCOSE BLD-MCNC: 72 MG/DL (ref 70–125)
HCT VFR BLD AUTO: 37.7 % (ref 35–47)
HDLC SERPL-MCNC: 68 MG/DL
HGB BLD-MCNC: 12.3 G/DL (ref 12–16)
LDLC SERPL CALC-MCNC: 118 MG/DL
MCH RBC QN AUTO: 28.5 PG (ref 27–34)
MCHC RBC AUTO-ENTMCNC: 32.6 G/DL (ref 32–36)
MCV RBC AUTO: 87 FL (ref 80–100)
PLATELET # BLD AUTO: 236 THOU/UL (ref 140–440)
PMV BLD AUTO: 9.9 FL (ref 7–10)
POTASSIUM BLD-SCNC: 4.1 MMOL/L (ref 3.5–5)
PROT SERPL-MCNC: 7 G/DL (ref 6–8)
RBC # BLD AUTO: 4.32 MILL/UL (ref 3.8–5.4)
SODIUM SERPL-SCNC: 139 MMOL/L (ref 136–145)
TRIGL SERPL-MCNC: 142 MG/DL
TSH SERPL DL<=0.005 MIU/L-ACNC: 2.47 UIU/ML (ref 0.3–5)
WBC: 8.6 THOU/UL (ref 4–11)

## 2021-05-19 ASSESSMENT — MIFFLIN-ST. JEOR: SCORE: 1241.54

## 2021-05-20 LAB
25(OH)D3 SERPL-MCNC: 59.4 NG/ML (ref 30–80)
25(OH)D3 SERPL-MCNC: 59.4 NG/ML (ref 30–80)
HPV SOURCE: NORMAL
HUMAN PAPILLOMA VIRUS 16 DNA: NEGATIVE
HUMAN PAPILLOMA VIRUS 18 DNA: NEGATIVE
HUMAN PAPILLOMA VIRUS FINAL DIAGNOSIS: NORMAL
HUMAN PAPILLOMA VIRUS OTHER HR: NEGATIVE
SPECIMEN DESCRIPTION: NORMAL
THYROID PEROXIDASE ANTIBODIES - HISTORICAL: <3 IU/ML (ref 0–5.6)

## 2021-05-26 ENCOUNTER — OFFICE VISIT (OUTPATIENT)
Dept: NEUROLOGY | Facility: CLINIC | Age: 46
End: 2021-05-26
Payer: COMMERCIAL

## 2021-05-26 VITALS
HEIGHT: 63 IN | WEIGHT: 143 LBS | BODY MASS INDEX: 25.34 KG/M2 | SYSTOLIC BLOOD PRESSURE: 114 MMHG | DIASTOLIC BLOOD PRESSURE: 75 MMHG | HEART RATE: 63 BPM

## 2021-05-26 VITALS
SYSTOLIC BLOOD PRESSURE: 158 MMHG | OXYGEN SATURATION: 100 % | TEMPERATURE: 98.2 F | HEART RATE: 89 BPM | DIASTOLIC BLOOD PRESSURE: 60 MMHG

## 2021-05-26 PROCEDURE — 64615 CHEMODENERV MUSC MIGRAINE: CPT | Performed by: PSYCHIATRY & NEUROLOGY

## 2021-05-26 ASSESSMENT — PATIENT HEALTH QUESTIONNAIRE - PHQ9: SUM OF ALL RESPONSES TO PHQ QUESTIONS 1-9: 0

## 2021-05-26 ASSESSMENT — MIFFLIN-ST. JEOR: SCORE: 1247.77

## 2021-05-26 NOTE — PROCEDURES
NEUROLOGY PROCEDURE NOTE  May 26, 2021      Chronic migraine Botox injection    CONSENT: The procedure was explained to the patient. The risks and benefits of the procedure and the alternatives were discussed with the patient. The patient was given an opportunity to ask any questions she had about the procedure. A verbal consent was obtained from the patient. A written consent is available in the chart.    PRE-PROCEDURE  Diagnosis: Chronic migraine  Headache frequency before the use of Botox:- 30 headache days per month  Headache frequency with Botox use:-2-3 headache days per month    EXAM  GENERAL: Healthy appearing, alert, no acute distress, normal habitus.  NEUROLOGICAL:  Patient is alert with fluent language.  Extraocular movements are intact.  Facial movements are present and symmetric.  No arm drift.    PROCEDURE SUMMARY:   The following muscles were identified after palpating for landmarks and the overlying skin was cleansed with alcohol. These muscles were then injected using a 30 guage- half  inch needle with the following doses of onabotulinumtoxin A. A 5 unit/ 0.1 ml dilution was used for the injections. A 2 x 100 unit vial was used.    1. Corrugators 5 units each side (not done).  2. Procerus 5 units (not done).  3. Frontalis 10 units each side (not done).  4. Temporalis 20 units each side.  5. Occipitalis 15 units each side.  6. Paraspinals 15 units each side.  7. Trapezius 25 units each side.    Patient has had a blepharoplasty and will hold off injecting in the forehead.    Units Injected: 150 Units  Units Discarded: 50 Units  Lot Number and Expiration: [id number]; 10/2023    The patient tolerated the procedure without any immediate complaints and will call the Neurology clinic if she has any problems or side effects from the medication. The patient will follow up in the Neurology clinic as previously decided.      King Taylor MD  Neurologist  [facility]-  [place]  [phone]

## 2021-05-26 NOTE — NURSING NOTE
Chief Complaint   Patient presents with     Botox Migraine     Botox for migraines     Garfield Pressley MA on 5/26/2021 at 12:16 PM

## 2021-05-26 NOTE — LETTER
5/26/2021         RE: Shyann Jurado  [address] [place] [id number]        Dear Colleague,    Thank you for referring your patient, Shyann Jurado, to the [facility]UR[facility]IN[facility] [place]. Please see a copy of my visit note below.    See procedure note.       Again, thank you for allowing me to participate in the care of your patient.        Sincerely,        King Taylor MD

## 2021-05-27 VITALS
HEART RATE: 73 BPM | HEIGHT: 63 IN | SYSTOLIC BLOOD PRESSURE: 107 MMHG | WEIGHT: 142.5 LBS | TEMPERATURE: 97.9 F | BODY MASS INDEX: 25.25 KG/M2 | DIASTOLIC BLOOD PRESSURE: 73 MMHG | RESPIRATION RATE: 16 BRPM

## 2021-05-27 VITALS
TEMPERATURE: 98.2 F | HEART RATE: 88 BPM | OXYGEN SATURATION: 100 % | DIASTOLIC BLOOD PRESSURE: 77 MMHG | SYSTOLIC BLOOD PRESSURE: 118 MMHG

## 2021-05-27 ASSESSMENT — PATIENT HEALTH QUESTIONNAIRE - PHQ9: SUM OF ALL RESPONSES TO PHQ QUESTIONS 1-9: 0

## 2021-05-27 NOTE — TELEPHONE ENCOUNTER
Denial letter says she has not tried and failed any of theses:    Anabela Birch  Drospoerinone-Ethinyl 3-0.02    Letter also states that it cannot be failed due to lack of effect.  It must be due to allergic reaction.

## 2021-05-27 NOTE — TELEPHONE ENCOUNTER
Patient would like to schedule MRA. Order says for it to be done in July. Patient states that it was supposed to be done in May. Please review.

## 2021-05-27 NOTE — TELEPHONE ENCOUNTER
Patient informed.  FYI, she tends to get migraines with generic meds.  She also thinks she may have tried Giavni in the past also.  She will try this one and let us know.  She still has a couple of months of Irma left.

## 2021-05-27 NOTE — TELEPHONE ENCOUNTER
Please call patient to let he know her prior auth for YAO birth control denied.  Please see if she wishes to pay out of pocket or ask her to check with her insurance on what birth control is covered and I am happy to send a script. Also, psychical due yearly, thanks.

## 2021-05-27 NOTE — TELEPHONE ENCOUNTER
Shyann's MRA is due in July but can be moved up if she is having issues or concerns.  Slo Hart RN, CNRN

## 2021-05-28 NOTE — PATIENT INSTRUCTIONS - HE
Per neurosurgery, they recommend not taking phentermine.     No ibuprofen, aspirin, or 5 HTP until after the procedure.      Shyann, please ask the neurosurgeon if you can take the Imitrex as needed or the 5 HTP because of your aneurysm.    Let neurosurgeon know about your headache after your last plane decent and ask about safety of future plane travel.     Follow up in 3-6 months for a fasting physical.

## 2021-05-28 NOTE — PROCEDURES
[facility] Post Procedure    Procedure: Cerebral angiogram     Radiologist: Wes Robles    Contrast: 80 ml omnipaque; 20 ml visi  Fluoro Time: 4.7 minutes  Air Kerma: 1463 mGy    Medications: Versed 2.5 mg IV                        Fentanyl 125 mcg IV  Sedation Time: 45 minutes    EBL: minimal  Complications: none    Preliminary findings: (see dictation for full detail)  2 mm maximum diameter right superior hypophyseal aneurysm    Assess/Plan:   Routine post procedure monitoring   Bedrest x 4 hours  Discharge when meets criteria   Report to Dr. Laura Robles

## 2021-05-28 NOTE — H&P
[facility] History and Physical Note    Procedure Requested: Cerebral angiogram   Requesting Provider: Dr. Sanchez    I have performed an assessment and examined the patient, as necessary, to update the patient's current status that may have changed since the prior History and Physical.  The History & Physical has been reviewed and the patient's status is unchanged.      [facility]: Interventional Radiology   [phone]

## 2021-05-28 NOTE — TELEPHONE ENCOUNTER
Writer spoke with patient regarding questions with her MRA. She does have an appointment with Dr. Sanchez on June 25, 2019 and would prefer to keep this appointment rather than seeing him in July. Per Sol's previous message, the MRA can be moved up if she is having issues or concerns. Patient did decide to keep the appointment in June and will schedule the MRA before she sees Dr. Sanchez.  Etienne White LPN

## 2021-05-28 NOTE — PROGRESS NOTES
Preoperative Exam    Scheduled Procedure: Cerebral Angiogram  Surgery Date:  5/20/19  Surgery Location: , fax [phone]    Surgeon:  Dr Sanchez ordering Physician    Assessment/Plan:     1. Preop examination     2. Brain aneurysm         Surgical Procedure Risk: Low (reported cardiac risk generally < 1%)  Have you had prior anesthesia?: Yes  Have you or any family members had a previous anesthesia reaction:  Yes:  Patient not sensitive to anesthesia and needs more than others to have adequate sedation / anesthia  Do you or any family members have a history of a clotting or bleeding disorder?: No  Cardiac Risk Assessment: no increased risk for major cardiac complications    Patient approved for surgery with general or local anesthesia.   Functional Status: Independent  Patient plans to recover at home with family.     Per neurosurgery, they recommend not taking phentermine.     No ibuprofen, aspirin, or 5 HTP until after the procedure.      Shyann, please ask the neurosurgeon if you can take the Imitrex as needed or the 5 HTP because of your aneurysm.    Let neurosurgeon know about your headache after your last plane decent and ask about safety of future plane travel.     Follow up in 3-6 months for a fasting physical.     Subjective:      Shyann Jurado is a 44 y.o. female who presents for a preoperative consultation for a 4 vessel cerebral angiogram on 5/30 at [facility] following an aneurysm.  Patient with headaches for 1 year to 1 /2 years.  Had brain MRA in Nov. 2018.  See below report.  Told by neurosurgeon to stop Phentermine.The patient reports that 2 weeks ago, she had a sudden onset of a severe headache after an airplane descent. She states that she drank caffeine and took ibuprofen and that headache improved about 4 hours later.     Brain MRA: Approximately 2 mm outpouching in the region of the right carotid cave may  reflect a small aneurysm. This is near the dural rings and could  be intradural  in location.    ROS  All other systems reviewed and are negative, other than those listed in the HPI.    PMFSH  Migraines   Family history of aneurysms    Pertinent History  Do you have difficulty breathing or chest pain after walking up a flight of stairs: No  History of obstructive sleep apnea: No  Steroid use in the last 6 months: No  Frequent Aspirin/NSAID use: No  Prior Blood Transfusion: No  Prior Blood Transfusion Reaction: No  If for some reason prior to, during or after the procedure, if it is medically indicated, would you be willing to have a blood transfusion?:  There is no transfusion refusal.    Current Outpatient Medications   Medication Sig Dispense Refill     drospirenone-ethinyl estradiol (ADRIÁN) 3-0.03 mg per tablet Take 1 tablet by mouth daily.       doxylamine (UNISON) 25 mg tablet Take 25 mg by mouth at bedtime as needed for sleep.       drospirenone-ethinyl estradiol (YAO) 3-0.02 mg per tablet Take 1 tablet by mouth daily. TAKE 1 TABLET BY MOUTH OF ACTIVE PILLS DAILY FOR 4 MONTHS THEN TAKE 1 PILL-FREE WEEK FOR PERIOD THEN REPEAT 120 tablet 3     ergocalciferol (ERGOCALCIFEROL) 50,000 unit capsule TAKE ONE CAPSULE BY MOUTH TWO TIMES A WEEK (Patient taking differently: TAKE ONE CAPSULE BY MOUTH weekly) 24 capsule 3     ferrous sulfate 325 (65 FE) MG tablet Take 1 tablet (325 mg total) by mouth daily with breakfast. 90 tablet 1     ibuprofen (ADVIL,MOTRIN) 800 MG tablet Give 800mg now       melatonin 3 mg Tab tablet Take 3 mg by mouth at bedtime as needed.              phentermine 15 MG capsule Take 1-2 capsules daily.. 60 capsule 0     SUMAtriptan (IMITREX) 50 MG tablet Take 1 tablet (50 mg total) by mouth every 2 (two) hours as needed for migraine (max of 200 mg per day). 30 tablet 2     No current facility-administered medications for this visit.         Allergies   Allergen Reactions     Erythromycin Nausea And Vomiting     Topamax [Topiramate]      Hair loss       Patient  Active Problem List   Diagnosis     Major Depression, Recurrent     Vitamin D Deficiency     Fatigue     Overweight     High risk medication use     Iron deficiency anemia- possible IRIDA     Hypothyroid     History of colonic polyps       Past Medical History:   Diagnosis Date     Anemia        Past Surgical History:   Procedure Laterality Date     FINGER TENDON REPAIR  1991     [place]       [place] COLONOSCOPY FLX DX W/COLLJ SPEC WHEN PFRMD       abd pain; normal per pt     [place] RECTUM SURGERY PROCEDURE UNLISTED  , 2007    4th degree laceration from , repeat procedure for repair       Social History     Socioeconomic History     Marital status:      Spouse name: Not on file     Number of children: 2     Years of education: Not on file     Highest education level: Not on file   Occupational History     Occupation: Homemaker   Social Needs     Financial resource strain: Not on file     Food insecurity:     Worry: Not on file     Inability: Not on file     Transportation needs:     Medical: Not on file     Non-medical: Not on file   Tobacco Use     Smoking status: Never Smoker     Smokeless tobacco: Never Used   Substance and Sexual Activity     Alcohol use: Yes     Comment: social drink 1-2/month     Drug use: No     Sexual activity: Never   Lifestyle     Physical activity:     Days per week: Not on file     Minutes per session: Not on file     Stress: Not on file   Relationships     Social connections:     Talks on phone: Not on file     Gets together: Not on file     Attends L[employer]-day service: Not on file     Active member of club or organization: Not on file     Attends meetings of clubs or organizations: Not on file     Relationship status: Not on file     Intimate partner violence:     Fear of current or ex partner: Not on file     Emotionally abused: Not on file     Physically abused: Not on file     Forced sexual activity: Not on file   Other Topics Concern     Not on file  "  Social History Narrative     Not on file       Patient Care Team:  Dahlia Walton MD as PCP - General (Family Medicine)          Objective:     Vitals:    05/15/19 1532   BP: 101/65   Pulse: 75   Resp: 16   SpO2: 99%   Weight: 150 lb 9.6 oz (68.3 kg)   Height: 5' 2.72\" (1.593 m)         Physical Exam:  /65 (Patient Site: Left Arm, Patient Position: Sitting, Cuff Size: Adult Regular)   Pulse 75   Resp 16   Ht 5' 2.72\" (1.593 m)   Wt 150 lb 9.6 oz (68.3 kg)   SpO2 99%   BMI 26.92 kg/m      General Appearance:    Alert, cooperative, no distress, appears stated age   Head:    Normocephalic, without obvious abnormality, atraumatic   Eyes:    PERRL, conjunctiva/corneas clear, EOM's intact, fundi     benign, both eyes   Ears:    Normal TM's and external ear canals, both ears   Nose:   Nares normal, septum midline, mucosa normal, no drainage     or sinus tenderness   Throat:   Lips, mucosa, and tongue normal; teeth and gums normal   Neck:   Supple, symmetrical, trachea midline, no adenopathy;     thyroid:  no enlargement/tenderness/nodules; no carotid    bruit or JVD   Back:     Symmetric, no curvature, ROM normal, no CVA tenderness   Lungs:     Clear to auscultation bilaterally, respirations unlabored        Heart:    Regular rate and rhythm, S1 and S2 normal, no murmur, rub    or gallop       Abdomen:     Soft, non-tender, bowel sounds active all four quadrants,     no masses, no organomegaly           Extremities:   Extremities normal, atraumatic, no cyanosis or edema   Pulses:   2+ and symmetric all extremities       Lymph nodes:   Cervical, supraclavicular, and axillary nodes normal   Neurologic:   CNII-XII intact, normal strength, sensation and reflexes     throughout       There are no Patient Instructions on file for this visit.    Labs:  Labs pending at this time.  Results will be reviewed when available.    Immunization History   Administered Date(s) Administered     Hep A, Adult IM (19yr & " older) 04/11/2007, 01/07/2008     Influenza, inj, historic,unspecified 11/20/2012     Influenza, seasonal,quad inj 36+ mos 09/09/2015, 10/24/2016     Influenza,live, Nasal Laiv4 10/25/2014     Influenza,seasonal quad, PF, 36+MOS 11/08/2018     Influenza,seasonal, Inj IIV3 11/21/2005, 10/17/2006, 10/03/2013     Td, Adult, Absorbed 06/27/2005     Tdap 04/14/2014       ADDITIONAL HISTORY SUMMARIZED (2): None.  DECISION TO OBTAIN EXTRA INFORMATION (1): None.   RADIOLOGY TESTS (1): 11/30/2018 Brain MRA reviewed; conclusion: Approximately 2 mm outpouching in the region of the right carotid cave may  reflect a small aneurysm. This is near the dural rings and could be intradural in location. Otherwise negative brain MRA.    LABS (1): None.  MEDICINE TESTS (1): None.  INDEPENDENT REVIEW (2 each): None.     Total data points: 1    The visit lasted a total of 20 minutes face to face with the patient. Over 50% of the time was spent counseling and educating the patient about headaches and aneurysms.    IRadha, am scribing for and in the presence of, Dr. Walton at  5/15/19 . 3:52pm    IDr. Walton, personally performed the services described in this documentation, as scribed by Radha Smith in my presence, and it is both accurate and complete.          Electronically signed by Dahlia Walton MD 05/15/19 3:20 PM

## 2021-05-28 NOTE — PROGRESS NOTES
"[facility]  Pre-Sedation Assessment    Reason for procedure: Cerebral aneurysm      HPI: Shyann Jurado is a 44 year old female with headaches for 1-1.5 years and family history of cerebral aneurysms in her mother and maternal aunt and uncle (SAH). Underwent brain MRI in 2018 showing right carotid cave aneurysm. Recently had a sudden onset of a headache during plane decent which improved with caffeine and ibuprofen. Followed by Dr. Sanchez who is recommending diagnostic cerebral angiogram.     History and Physical Reviewed: yes 5/15/2019 Dr. Dahlia Walton    Past Medical History:   Diagnosis Date     Anemia        Past Surgical History:   Procedure Laterality Date     FINGER TENDON REPAIR  1991     [place]  DELIVERY ONLY       [place] COLONOSCOPY FLX DX W/COLLJ SPEC WHEN PFRMD       abd pain; normal per pt     [place] RECTUM SURGERY PROCEDURE UNLISTED  , 2007    4th degree laceration from , repeat procedure for repair       Allergies:  Allergies   Allergen Reactions     Erythromycin Nausea And Vomiting     Topamax [Topiramate]      Hair loss       Exam:   /71 (Patient Position: Sitting)   Pulse 75   Temp 98.6  F (37  C) (Oral)   Resp 18   Ht 5' 3\" (1.6 m)   Wt 148 lb 8 oz (67.4 kg)   SpO2 100%   BMI 26.31 kg/m      General: resting comfortably in bed, no apparent distress  Neuro: alert and oriented x 4, speech is clear, moves all extremities   Cardiac: regular rate and rhythm  Lungs: clear to auscultation, non-labored breathing   Mallampati score: Class 1. Full visibility of tonsils, uvula, and soft palate  ASA: ASA I A normal healthy patient   Previous reaction to sedation/anesthesia: no  Sedation based on assessment: moderate  NPO since: midnight       Labs:   Lab Results   Component Value Date    HGB 11.5 (L) 05/15/2019     Lab Results   Component Value Date     2019     Lab Results   Component Value Date    CREATININE 0.69 2018     No results found " for: INR, PROTIME    5/15/2019 urine pregnancy: negative     Imaging:   [facility]  HEAD MRI WITHOUT IV CONTRAST  HEAD MRA WITHOUT IV CONTRAST  11/29/2018 7:00 PM  CONCLUSION:  HEAD MRI:  1.  Negative brain MRI. No acute intracranial finding. No evidence for recent  ischemia, intracranial hemorrhage, or mass.     HEAD MRA:  1.  Approximately 2 mm outpouching in the region of the right carotid cave may  reflect a small aneurysm. This is near the dural rings and could be intradural  in location.  2.  Otherwise negative brain MRA.      Impression: 44 year old female with incidental cerebral aneurysm. Family history of cerebral aneurysm; one with SAH. Patient is adequately NPO and medically stable to proceed with planned procedure using moderate intravenous sedation.    Plan: Cerebral angiogram     Cerebral angiogram procedure its risks, benefits, and alternatives were discussed. Risks including, but not limited to pain, hemorrhage, groin hematoma, blood vessel damage, infection, renal failure, contrast dye or medication reaction, or stroke were discussed with patient. Questions answered and the patient gives consent to proceed.    Susan Payne, CNP  [phone]

## 2021-05-29 NOTE — TELEPHONE ENCOUNTER
Patient is calling asking about the results of her angiogram and wondering if they are released to [facility] prior to her appt. Please call the patient back at [phone]

## 2021-05-29 NOTE — TELEPHONE ENCOUNTER
Shyann called back again. She was able to link her HE and [facility] MyCharts, but she said the results of angio still are in the [facility] Mychart. She would like Sol to call her back at [phone].

## 2021-05-29 NOTE — TELEPHONE ENCOUNTER
Called and informed Shyann that we are unable to release info into [facility] My Chart. She would like the results to be faxed to her at [phone] - done.  Sol Hart RN, CNRN

## 2021-05-29 NOTE — TELEPHONE ENCOUNTER
Called and informed Shyann that the results were released to her chart. F/u appt on 6/25/2019.  Sol Hart RN, CNRN

## 2021-05-30 VITALS — WEIGHT: 132.8 LBS | BODY MASS INDEX: 23.34 KG/M2

## 2021-05-30 NOTE — PROGRESS NOTES
Dear Dr. Walton:  I had the pleasure of seeing Shyann Jurado in follow-up in my neurosurgery clinic as well where she was found to have a 2 mm outpouching in the region of the right carotid cave suggesting an aneurysm.  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  Very mild fusiform ectasia of the paraclinoid segment and gives rise is more focal saccular aneurysm.  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  We discussed these questions.  My recommendation is annual surveillance with MRAs.  She does aneurysm change in configuration or size will discuss treatment options including surgical clipping versus endovascular treatment.  Thank you for giving me the opportunity to see this very pleasant patient.  If you have any questions about Shyann or any other patients please feel free to contact me.  Of note I spent greater than 25 minutes counseling the patient regarding her pathology and treatment plan, greater than 50 percent of which was spent in direct counseling.    Fabiola Sanchez MD, FAANS

## 2021-05-30 NOTE — PROGRESS NOTES
Shyann Jurado was last seen on 1/8/2019 for a right ICA aneurysm.  Today she returns in follow up with an updated MRA scan. She is accompanied by her mother. She presents with a chief complaint of occasional not positional migraine-type HA. Denies any new symptoms in the interim. Gait and balance are normal. Speech is fluent. Cognition is intact.  Sol Hart RN, CNRN

## 2021-05-31 VITALS — HEIGHT: 63 IN | WEIGHT: 147.5 LBS | BODY MASS INDEX: 26.13 KG/M2

## 2021-06-01 VITALS — BODY MASS INDEX: 26.47 KG/M2 | WEIGHT: 149.44 LBS

## 2021-06-01 VITALS — WEIGHT: 144.38 LBS | BODY MASS INDEX: 25.57 KG/M2

## 2021-06-02 ENCOUNTER — RECORDS - HEALTHEAST (OUTPATIENT)
Dept: ADMINISTRATIVE | Facility: CLINIC | Age: 46
End: 2021-06-02

## 2021-06-02 VITALS — HEIGHT: 63 IN | WEIGHT: 150.6 LBS | BODY MASS INDEX: 26.68 KG/M2

## 2021-06-02 VITALS — WEIGHT: 148.5 LBS | HEIGHT: 63 IN | BODY MASS INDEX: 26.31 KG/M2

## 2021-06-02 VITALS — BODY MASS INDEX: 26.04 KG/M2 | WEIGHT: 147 LBS

## 2021-06-03 NOTE — TELEPHONE ENCOUNTER
RN cannot approve Refill Request    RN can NOT refill this medication historical medication requested. Last office visit: 11/21/2018 Dahlia Walton MD Last Physical: 5/15/2019 Last MTM visit: Visit date not found Last visit same specialty: 11/21/2018 Dahlia Walton MD.  Next visit within 3 mo: Visit date not found  Next physical within 3 mo: Visit date not found      Osiris Villegas, [facility] Connection Triage/Med Refill 11/7/2019    Requested Prescriptions   Pending Prescriptions Disp Refills     DAMION 3-0.03 mg per tablet [Pharmacy Med Name: DAMION 3-0.03MG TABLETS 28S] 84 tablet 0     Sig: TAKE 1 TABLET BY MOUTH EVERY DAY       Oral Contraceptives Protocol Passed - 11/6/2019  7:00 PM        Passed - Visit with PCP or prescribing provider visit in last 12 months      Last office visit with prescriber/PCP: 11/21/2018 Dahlia Walton MD OR same dept: 11/21/2018 Dahlia Walton MD OR same specialty: 11/21/2018 Dahlia Walton MD  Last physical: 5/15/2019 Last MTM visit: Visit date not found   Next visit within 3 mo: Visit date not found  Next physical within 3 mo: Visit date not found  Prescriber OR PCP: Dahlia Walton MD  Last diagnosis associated with med order: There are no diagnoses linked to this encounter.  If protocol passes may refill for 12 months if within 3 months of last provider visit (or a total of 15 months).

## 2021-06-04 VITALS
HEART RATE: 77 BPM | BODY MASS INDEX: 25.69 KG/M2 | TEMPERATURE: 98.1 F | DIASTOLIC BLOOD PRESSURE: 75 MMHG | RESPIRATION RATE: 16 BRPM | WEIGHT: 145 LBS | HEIGHT: 63 IN | SYSTOLIC BLOOD PRESSURE: 108 MMHG

## 2021-06-04 VITALS
WEIGHT: 145.4 LBS | OXYGEN SATURATION: 97 % | HEART RATE: 85 BPM | RESPIRATION RATE: 18 BRPM | SYSTOLIC BLOOD PRESSURE: 102 MMHG | DIASTOLIC BLOOD PRESSURE: 68 MMHG | BODY MASS INDEX: 25.76 KG/M2 | TEMPERATURE: 98.8 F

## 2021-06-04 VITALS
HEART RATE: 88 BPM | WEIGHT: 143.2 LBS | BODY MASS INDEX: 25.37 KG/M2 | OXYGEN SATURATION: 98 % | SYSTOLIC BLOOD PRESSURE: 136 MMHG | TEMPERATURE: 98.4 F | DIASTOLIC BLOOD PRESSURE: 77 MMHG

## 2021-06-05 VITALS
BODY MASS INDEX: 26.78 KG/M2 | RESPIRATION RATE: 16 BRPM | SYSTOLIC BLOOD PRESSURE: 109 MMHG | WEIGHT: 151.13 LBS | HEIGHT: 63 IN | DIASTOLIC BLOOD PRESSURE: 71 MMHG | TEMPERATURE: 98.1 F | HEART RATE: 84 BPM

## 2021-06-06 NOTE — PATIENT INSTRUCTIONS - HE
Follows with neurology for migraines.    Follow with neurosurgery for brain aneurysm.    Brain MRA due in may. Patient will contact neurosurgery.    Hep B vaccine check today. If it is negative, can set a nurse appointments to get the 3 Hep B shots.    Sees dermatology every 6 months for full body skin checks.        Health Maintenance   Topic Date Due     DEPRESSION ACTION PLAN  NA     HIV SCREENING  NA     PREVENTIVE CARE VISIT  Yearly     MAMMOGRAM  11/12/2020     COLONOSCOPY  12/11/2023, as long as no blood in stool, no change in bowel habits, no abdominal pain     PAP SMEAR  Due in 1 year     LIPID  Today     HPV TEST  Due in 1 year     ADVANCE CARE PLANNING  We would like a copy please     TD 18+ HE  04/14/2024     INFLUENZA VACCINE RULE BASED  Completed     TDAP ADULT ONE TIME DOSE  Completed         If you choose to use [email or web address] to send a provider a message please keep this very brief.  They should only be used for a follow-up questions to a previous appointment.  New concerns should addressed by a phone call to triage, E -visit or an office visit. Thank-you.    Your lab results will be communicated to you via letter, [email or web address] message or phone call when they are all back.  Please refrain from sending messages on one specific lab until they are all back.  Thank you.

## 2021-06-06 NOTE — PROGRESS NOTES
Assessment/Plan:  1. Routine general medical examination at a health care facility  Hepatitis B Surface Antibody (Anti-HBs) Vaccine Check   2. Initiation of OCP (BCP)  drospirenone-ethinyl estradioL (DAMION) 3-0.03 mg per tablet   3. Routine history and physical examination of adult  Comprehensive Metabolic Panel    Lipid Cascade   4. Vitamin D deficiency  ergocalciferol (ERGOCALCIFEROL) 1,250 mcg (50,000 unit) capsule    Vitamin D, Total (25-Hydroxy)   5. Other iron deficiency anemia  HM2(CBC w/o Differential)    Iron and Transferrin Iron Binding Capacity    Ferritin   6. History of colonic polyps     7. Hypothyroidism, unspecified type  Thyroid Cascade       Patient is a 44 y.o. female here for physical exam. See health maintenance section below. Labs as ordered.      Follows with neurology for migraines.    Follow with neurosurgery for brain aneurysm.    Brain MRA due in May. Patient will contact neurosurgery.    Hep B vaccine check today. If it is negative, can set a nurse appointments to get the 3 Hep B shots.    Sees dermatology every 6 months for full body skin checks.    HPI    Chief Complaint   Patient presents with     Annual Exam     Fasting labs, no concerns      Constipation: The patient has gone to see a GI specialist due to constipation issues. She says that the doctor did not seem too concerned about her colon. The patient states that after the visit and doing the work up to help reset her system, she feels better. She says that she is no longer having her constipation problem.   Migraines: the patient reports that her mom had migraines around the same age as she did and those went away after entering menopause. She is wondering if her migraines may be the same as her mother or that her headaches are based upon her hormones.     Health Maintenance   Topic Date Due     DEPRESSION ACTION PLAN  NA     HIV SCREENING  NA     PREVENTIVE CARE VISIT  Yearly     MAMMOGRAM  11/12/2020     COLONOSCOPY   12/11/2023, as long as no blood in stool, no change in bowel habits, no abdominal pain     PAP SMEAR  Due in 1 year     LIPID  Today     HPV TEST  Due in 1 year     ADVANCE CARE PLANNING  We would like a copy please     TD 18+ HE  04/14/2024     INFLUENZA VACCINE RULE BASED  Completed     TDAP ADULT ONE TIME DOSE  Completed        Patient Active Problem List   Diagnosis     Vitamin D Deficiency     Fatigue     Iron deficiency anemia- possible IRIDA     Hypothyroid     History of colonic polyps     Brain aneurysm     Current Outpatient Medications   Medication Sig Note     5-hydroxytryptophan, 5-HTP, (5-HTP ORAL) Take by mouth.      cyclobenzaprine (FLEXERIL) 5 MG tablet TAKE 1 TABLET PO TID PRN FOR MUSCLE SPASMS.      doxylamine (UNISON) 25 mg tablet Take 25 mg by mouth at bedtime as needed for sleep.      drospirenone-ethinyl estradioL (DAMION) 3-0.03 mg per tablet Take 1 tablet by mouth daily.      ergocalciferol (ERGOCALCIFEROL) 1,250 mcg (50,000 unit) capsule TAKE ONE CAPSULE BY MOUTH weekly      ibuprofen (ADVIL,MOTRIN) 800 MG tablet Give 800mg now 11/3/2015: Received from: [facility]     melatonin 3 mg Tab tablet Take 3 mg by mouth at bedtime as needed.             SUMAtriptan (IMITREX) 25 MG tablet Take 25 mg by mouth every 2 (two) hours as needed for migraine.      SUMAtriptan (IMITREX) 6 mg/0.5 mL Soln Inject 6 mg under the skin every 2 (two) hours as needed.        Patient is a 44 y.o. female presents for a physical exam.    The following portions of the patient's history were reviewed and updated as appropriate: past medical history, past social history, past surgical history and problem list.    Review of Systems  Pertinent items are noted in HPI.  Immunization History   Administered Date(s) Administered     Hep A, Adult IM (19yr & older) 04/11/2007, 01/07/2008     Influenza, inj, historic,unspecified 11/20/2012     Influenza,live, Nasal Laiv4 10/25/2014     Influenza,seasonal quad, PF, =/>  6months 11/08/2018     Influenza,seasonal, Inj IIV3 11/21/2005, 10/17/2006, 10/03/2013     Influenza,seasonal,quad inj =/> 6months 09/09/2015, 10/24/2016, 10/08/2019     Td, Adult, Absorbed 06/27/2005     Tdap 04/14/2014     Recent Results (from the past 240 hour(s))   Comprehensive Metabolic Panel   Result Value Ref Range    Sodium 137 136 - 145 mmol/L    Potassium 4.6 3.5 - 5.0 mmol/L    Chloride 104 98 - 107 mmol/L    CO2 22 22 - 31 mmol/L    Anion Gap, Calculation 11 5 - 18 mmol/L    Glucose 77 70 - 125 mg/dL    BUN 10 8 - 22 mg/dL    Creatinine 0.72 0.60 - 1.10 mg/dL    GFR MDRD Af Amer >60 >60 mL/min/1.73m2    GFR MDRD Non Af Amer >60 >60 mL/min/1.73m2    Bilirubin, Total 0.4 0.0 - 1.0 mg/dL    Calcium 9.3 8.5 - 10.5 mg/dL    Protein, Total 7.0 6.0 - 8.0 g/dL    Albumin 3.6 3.5 - 5.0 g/dL    Alkaline Phosphatase 72 45 - 120 U/L    AST 17 0 - 40 U/L    ALT 13 0 - 45 U/L   Lipid Cascade   Result Value Ref Range    Cholesterol 213 (H) <=199 mg/dL    Triglycerides 188 (H) <=149 mg/dL    HDL Cholesterol 69 >=50 mg/dL    LDL Calculated 106 <=129 mg/dL    Patient Fasting > 8hrs? Yes    Hepatitis B Surface Antibody (Anti-HBs) Vaccine Check   Result Value Ref Range    HBV Surface Ab (Vaccine Check) Negative (!) Positive   HM2(CBC w/o Differential)   Result Value Ref Range    WBC 7.1 4.0 - 11.0 thou/uL    RBC 4.54 3.80 - 5.40 mill/uL    Hemoglobin 12.4 12.0 - 16.0 g/dL    Hematocrit 37.1 35.0 - 47.0 %    MCV 82 80 - 100 fL    MCH 27.3 27.0 - 34.0 pg    MCHC 33.4 32.0 - 36.0 g/dL    RDW 12.8 11.0 - 14.5 %    Platelets 228 140 - 440 thou/uL    MPV 8.1 7.0 - 10.0 fL   Vitamin D, Total (25-Hydroxy)   Result Value Ref Range    Vitamin D, Total (25-Hydroxy) 63.6 30.0 - 80.0 ng/mL   Iron and Transferrin Iron Binding Capacity   Result Value Ref Range    Iron 61 42 - 175 ug/dL    Transferrin 310 212 - 360 mg/dL    Transferrin Saturation, Calculated 16 (L) 20 - 50 %    Transferrin IBC, Calculated 388 313 - 563 ug/dL   Thyroid  "Cascade   Result Value Ref Range    TSH 2.82 0.30 - 5.00 uIU/mL   Ferritin   Result Value Ref Range    Ferritin 48 10 - 130 ng/mL     I have had an Advance Directives discussion with the patient.  Objective:    /75 (Patient Site: Left Arm, Patient Position: Sitting, Cuff Size: Adult Regular)   Pulse 77   Temp 98.1  F (36.7  C) (Oral)   Resp 16   Ht 5' 3\" (1.6 m)   Wt 145 lb (65.8 kg)   LMP 11/27/2019   BMI 25.69 kg/m        General Appearance:    Alert, cooperative, no distress, appears stated age   Head:    Normocephalic, without obvious abnormality, atraumatic   Eyes:    PERRL, conjunctiva/corneas clear, EOM's intact, fundi     benign, both eyes   Ears:    Normal TM's and external ear canals, both ears   Nose:   Nares normal, septum midline, mucosa normal, no drainage    or sinus tenderness   Throat:   Lips, mucosa, and tongue normal; teeth and gums normal   Neck:   Supple, symmetrical, trachea midline, no adenopathy;     thyroid:  no enlargement/tenderness/nodules; no carotid    bruit or JVD   Back:     Symmetric, no curvature, ROM normal, no CVA tenderness   Lungs:     Clear to auscultation bilaterally, respirations unlabored   Chest Wall:    No tenderness or deformity    Heart:    Regular rate and rhythm, S1 and S2 normal, no murmur, rub   or gallop   Breast Exam:    No tenderness, masses, or nipple abnormality   Abdomen:     Soft, non-tender, bowel sounds active all four quadrants,     no masses, no organomegaly           Extremities:   Extremities normal, atraumatic, no cyanosis or edema   Pulses:   2+ and symmetric all extremities       Lymph nodes:   Cervical, supraclavicular, and axillary nodes normal   Neurologic:   CNII-XII intact, normal strength, sensation and reflexes     throughout         ADDITIONAL HISTORY SUMMARIZED (2): 12/11/2018 Colonoscopy external results reviewed.   DECISION TO OBTAIN EXTRA INFORMATION (1): None.   RADIOLOGY TESTS (1): 05/20/2019 IR Cerebral Angiogram reviewed. " 11/12/2019 Mammo Screening bilateral reviewed.  LABS (1): Labs ordered today.  MEDICINE TESTS (1): None.  INDEPENDENT REVIEW (2 each): None.     The visit lasted a total of 32 minutes face to face with the patient. Over 50% of the time was spent counseling and educating the patient about wellness.    I, Miesha Khan am scribing for and in the presence of, Dr. Walton.    I, Dr. Walton, personally performed the services described in this documentation, as scribed by Miesha Khan in my presence, and it is both accurate and complete.    Total data points: 4

## 2021-06-08 NOTE — H&P (VIEW-ONLY)
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  It was most recently evaluated with an angiogram in May 2019 with the above measurements.  There is no contraindication to a repeat angiogram.  EKG and additional laboratory testing is not indicated given this procedure.  This procedure will be performed in a [facility] facility.  COVID testing has already been obtained but the result is pending at this time.  She was advised to discontinue nonsteroidal anti-inflammatories in anticipation for this procedure.           Other Visit Diagnoses     Pre-op exam            Surgical Procedure Risk: Low (reported cardiac risk generally < 1%)  Have you had prior anesthesia?: Yes  Have you or any family members had a previous anesthesia reaction:  No  Do you or any family members have a history of a clotting or bleeding disorder?: No  Cardiac Risk Assessment: no increased risk for major cardiac complications    APPROVAL GIVEN to proceed with proposed procedure, without further diagnostic evaluation    Functional Status: Independent  Patient plans to recover at home with family.     Subjective:      Shyann Jurado is a 45 y.o. female who presents for a preoperative consultation.  This is an annual follow-up angiogram given known aneurysm.  This was identified in November 2018 with an MRA which is a history of severe headaches.  She says no concerns about worsening symptoms over the past 12 months and this is merely for surveillance.  She otherwise feels well today.    All other systems reviewed and are negative, other than those listed in the HPI.    Pertinent History  Do you  have difficulty breathing or chest pain after walking up a flight of stairs: No  History of obstructive sleep apnea: No  Steroid use in the last 6 months: No  Frequent Aspirin/NSAID use: No  Prior Blood Transfusion: No  Prior Blood Transfusion Reaction: No  If for some reason prior to, during or after the procedure, if it is medically indicated, would you be willing to have a blood transfusion?:  There is no transfusion refusal.   History of anesthesia reactions: The patient does not have a specific negative experience with anesthesia but is previously been noted to be not sensitive to anesthesia requiring more/additional dosing.    Current Outpatient Medications   Medication Sig Dispense Refill     cyclobenzaprine (FLEXERIL) 5 MG tablet TAKE 1 TABLET PO TID PRN FOR MUSCLE SPASMS.       doxylamine (UNISON) 25 mg tablet Take 25 mg by mouth at bedtime as needed for sleep.       drospirenone-ethinyl estradioL (DAMION) 3-0.03 mg per tablet Take 1 tablet by mouth daily. 84 tablet 3     ergocalciferol (ERGOCALCIFEROL) 1,250 mcg (50,000 unit) capsule TAKE ONE CAPSULE BY MOUTH weekly 12 capsule 3     melatonin 3 mg Tab tablet Take 3 mg by mouth at bedtime as needed.              SUMAtriptan (IMITREX) 25 MG tablet Take 25 mg by mouth every 2 (two) hours as needed for migraine.       SUMAtriptan (IMITREX) 6 mg/0.5 mL Soln Inject 6 mg under the skin every 2 (two) hours as needed.       5-hydroxytryptophan, 5-HTP, (5-HTP ORAL) Take by mouth.       ibuprofen (ADVIL,MOTRIN) 800 MG tablet Give 800mg now       No current facility-administered medications for this visit.         Allergies   Allergen Reactions     Erythromycin Nausea And Vomiting     Topamax [Topiramate]      Hair loss       Patient Active Problem List   Diagnosis     Vitamin D Deficiency     Fatigue     Iron deficiency anemia- possible IRIDA     Hypothyroid     History of colonic polyps     Brain aneurysm       Past Medical History:   Diagnosis Date     Anemia         Past Surgical History:   Procedure Laterality Date     FINGER TENDON REPAIR  1991     IR CEREBRAL ANGIOGRAM  2019     DC  DELIVERY ONLY       DC COLONOSCOPY FLX DX W/COLLJ SPEC WHEN PFRMD  2002     abd pain; normal per pt     DC RECTUM SURGERY PROCEDURE UNLISTED  , 2007    4th degree laceration from , repeat procedure for repair       Social History     Socioeconomic History     Marital status:      Spouse name: Not on file     Number of children: 2     Years of education: Not on file     Highest education level: Not on file   Occupational History     Occupation: Homemaker   Social Needs     Financial resource strain: Not on file     Food insecurity     Worry: Not on file     Inability: Not on file     Transportation needs     Medical: Not on file     Non-medical: Not on file   Tobacco Use     Smoking status: Never Smoker     Smokeless tobacco: Never Used   Substance and Sexual Activity     Alcohol use: Yes     Comment: social drink 1-2/month     Drug use: No     Sexual activity: Never   Lifestyle     Physical activity     Days per week: Not on file     Minutes per session: Not on file     Stress: Not on file   Relationships     Social connections     Talks on phone: Not on file     Gets together: Not on file     Attends Latter day service: Not on file     Active member of club or organization: Not on file     Attends meetings of clubs or organizations: Not on file     Relationship status: Not on file     Intimate partner violence     Fear of current or ex partner: Not on file     Emotionally abused: Not on file     Physically abused: Not on file     Forced sexual activity: Not on file   Other Topics Concern     Not on file   Social History Narrative     Not on file       Patient Care Team:  Dahlia Walton MD as PCP - General (Family Medicine)  Dahlia Walton MD as Assigned PCP          Objective:     Vitals:    20 1538   BP: 102/68   Pulse: 85   Resp: 18    Temp: 98.8  F (37.1  C)   TempSrc: Oral   SpO2: 97%   Weight: 145 lb 6.4 oz (66 kg)         Physical Exam:  Physical Exam  Vitals signs reviewed.   Constitutional:       General: She is not in acute distress.     Appearance: She is well-developed.   HENT:      Head: Normocephalic and atraumatic.      Right Ear: External ear normal.      Left Ear: External ear normal.      Nose: Nose normal.      Mouth/Throat:      Pharynx: No oropharyngeal exudate.   Eyes:      General: No scleral icterus.        Right eye: No discharge.         Left eye: No discharge.      Conjunctiva/sclera: Conjunctivae normal.      Pupils: Pupils are equal, round, and reactive to light.   Neck:      Musculoskeletal: Normal range of motion.      Thyroid: No thyromegaly.   Cardiovascular:      Rate and Rhythm: Normal rate and regular rhythm.      Heart sounds: Normal heart sounds. No murmur. No friction rub. No gallop.    Pulmonary:      Effort: Pulmonary effort is normal. No respiratory distress.      Breath sounds: Normal breath sounds. No wheezing.   Abdominal:      General: There is no distension.      Palpations: Abdomen is soft. There is no mass.      Tenderness: There is no abdominal tenderness.   Musculoskeletal: Normal range of motion.   Lymphadenopathy:      Cervical: No cervical adenopathy.   Skin:     General: Skin is warm.   Neurological:      Mental Status: She is alert and oriented to person, place, and time.      Cranial Nerves: No cranial nerve deficit.      Motor: No abnormal muscle tone.      Deep Tendon Reflexes: Reflexes are normal and symmetric.   Psychiatric:         Behavior: Behavior normal.         Thought Content: Thought content normal.         Judgment: Judgment normal.       There are no Patient Instructions on file for this visit.    EKG: Not indicated.    Labs:  No labs were ordered during this visit    Immunization History   Administered Date(s) Administered     Hep A, Adult IM (19yr & older) 04/11/2007,  01/07/2008     Influenza, inj, historic,unspecified 11/20/2012     Influenza,live, Nasal Laiv4 10/25/2014     Influenza,seasonal quad, PF, =/> 6months 11/08/2018     Influenza,seasonal, Inj IIV3 11/21/2005, 10/17/2006, 10/03/2013     Influenza,seasonal,quad inj =/> 6months 09/09/2015, 10/24/2016, 10/08/2019     Td, Adult, Absorbed 06/27/2005     Tdap 04/14/2014       Electronically signed by Nemesio Dickerson MD 05/27/20 3:37 PM

## 2021-06-08 NOTE — PROGRESS NOTES
Interventional Neuroradiology-Pre Sedation assessment    45 year old female presents today for follow up angiogram    HPI: History of headaches and incidental finding of a Rt carotid aneurysm.  Family history of her mother and maternal uncle also having a cerebral aneurysm. She underwent cerebral angiogram last year 5/20/19.  Dr Sanchez is following and requested a repeat angiogram at this time.    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 The aneurysm neck measures up to 2.1 mm in maximum dimension. There is very mild fusiform ectasia of the paraclinoid segment   giving rise to this more focal saccular aneurysm.    Exam:   /60   Pulse 82   Temp 98.5  F (36.9  C) (Oral)   Resp 20   SpO2 99%   Gen: sitting up in a chair, NAD  Neuro: A/O x4, speech clear and fluent. EMMANUEL  Cardiac: S1S2  Lungs: clear  Mallampati: 2    Impression: Okay to proceed with planned procedure using moderate IV sedation    Plan: Cerebral angiogram procedure its risks, benefits, and alternatives including but not limited to bleeding, infection, vessel injury, contrast dye or medication reaction, renal failure, stroke were discussed with patient.  Questions answered and pateint gives consent to proceed.    Cherri Barclay, APRN, CNP

## 2021-06-08 NOTE — PROCEDURES
[facility] Post Procedure    Procedure: Cerebral angiogram    Radiologist: Emigdio Crooks    Contrast: 70 mL omnipaque                  25 ml visipaque  Fluoro time: 7.8 minutes  Fluoro dose: 1561 mGy    Medications: Versed 3 mg IV                        Fentanyl 150 mcg IV  Sedation Time: 30 minutes    EBL: minimal  Complications: none    Preliminary findings: (see dictation for full detail)  Stable right carotid superior hypophyseal aneurysm when compared to previous angiogram 5/20/19    Assess/Plan:   Report to Dr Laura Moody for 4 hours then discharge home    Emigdio Crooks MD

## 2021-06-08 NOTE — TELEPHONE ENCOUNTER
New Appointment Needed  What is the reason for the visit:    Pre-Op Appt Request  When is the surgery? :  05/29  Where is the surgery?:   [facility]  Who is the surgeon? :  Dr. wong  What type of surgery is being done?: angiogram  Provider Preference: any available  How soon do you need to be seen?: as soon as able  Waitlist offered?: No  Okay to leave a detailed message:  Yes

## 2021-06-08 NOTE — PRE-PROCEDURE
Procedure Name: cerebral angiogram  Date/Time: 5/29/2020 12:52 PM    Verbal consent obtained?: Yes  Written consent obtained?: Yes  Risks and benefits: Risks, benefits and alternatives were discussed  Consent given by: patient  Expected level of sedation: moderate  ASA Class: Class 2- mild systemic disease, no acute problems, no functional limitations  Mallampati: Grade 1- soft palate, uvula, tonsillar pillars, and posterior pharyngeal wall visible and Grade 2- soft palate, base of uvula, tonsillar pillars, and portion of posterior pharyngeal wall visible  Patient states understanding of procedure being performed: Yes  Patient's understanding of procedure matches consent: Yes  Appropriately NPO: yes  Lungs: lungs clear with good breath sounds bilaterally  Heart: normal heart sounds and rate  History & Physical reviewed: History and physical reviewed and no updates needed  Statement of review: I have reviewed the lab findings, diagnostic data, medications, and the plan for sedation

## 2021-06-08 NOTE — PROGRESS NOTES
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  It was most recently evaluated with an angiogram in May 2019 with the above measurements.  There is no contraindication to a repeat angiogram.  EKG and additional laboratory testing is not indicated given this procedure.  This procedure will be performed in a [facility] facility.  COVID testing has already been obtained but the result is pending at this time.  She was advised to discontinue nonsteroidal anti-inflammatories in anticipation for this procedure.           Other Visit Diagnoses     Pre-op exam            Surgical Procedure Risk: Low (reported cardiac risk generally < 1%)  Have you had prior anesthesia?: Yes  Have you or any family members had a previous anesthesia reaction:  No  Do you or any family members have a history of a clotting or bleeding disorder?: No  Cardiac Risk Assessment: no increased risk for major cardiac complications    APPROVAL GIVEN to proceed with proposed procedure, without further diagnostic evaluation    Functional Status: Independent  Patient plans to recover at home with family.     Subjective:      Shyann Jurado is a 45 y.o. female who presents for a preoperative consultation.  This is an annual follow-up angiogram given known aneurysm.  This was identified in November 2018 with an MRA which is a history of severe headaches.  She says no concerns about worsening symptoms over the past 12 months and this is merely for surveillance.  She otherwise feels well today.    All other systems reviewed and are negative, other than those listed in the HPI.    Pertinent History  Do you  have difficulty breathing or chest pain after walking up a flight of stairs: No  History of obstructive sleep apnea: No  Steroid use in the last 6 months: No  Frequent Aspirin/NSAID use: No  Prior Blood Transfusion: No  Prior Blood Transfusion Reaction: No  If for some reason prior to, during or after the procedure, if it is medically indicated, would you be willing to have a blood transfusion?:  There is no transfusion refusal.   History of anesthesia reactions: The patient does not have a specific negative experience with anesthesia but is previously been noted to be not sensitive to anesthesia requiring more/additional dosing.    Current Outpatient Medications   Medication Sig Dispense Refill     cyclobenzaprine (FLEXERIL) 5 MG tablet TAKE 1 TABLET PO TID PRN FOR MUSCLE SPASMS.       doxylamine (UNISON) 25 mg tablet Take 25 mg by mouth at bedtime as needed for sleep.       drospirenone-ethinyl estradioL (DAMION) 3-0.03 mg per tablet Take 1 tablet by mouth daily. 84 tablet 3     ergocalciferol (ERGOCALCIFEROL) 1,250 mcg (50,000 unit) capsule TAKE ONE CAPSULE BY MOUTH weekly 12 capsule 3     melatonin 3 mg Tab tablet Take 3 mg by mouth at bedtime as needed.              SUMAtriptan (IMITREX) 25 MG tablet Take 25 mg by mouth every 2 (two) hours as needed for migraine.       SUMAtriptan (IMITREX) 6 mg/0.5 mL Soln Inject 6 mg under the skin every 2 (two) hours as needed.       5-hydroxytryptophan, 5-HTP, (5-HTP ORAL) Take by mouth.       ibuprofen (ADVIL,MOTRIN) 800 MG tablet Give 800mg now       No current facility-administered medications for this visit.         Allergies   Allergen Reactions     Erythromycin Nausea And Vomiting     Topamax [Topiramate]      Hair loss       Patient Active Problem List   Diagnosis     Vitamin D Deficiency     Fatigue     Iron deficiency anemia- possible IRIDA     Hypothyroid     History of colonic polyps     Brain aneurysm       Past Medical History:   Diagnosis Date     Anemia         Past Surgical History:   Procedure Laterality Date     FINGER TENDON REPAIR  1991     IR CEREBRAL ANGIOGRAM  2019     AL  DELIVERY ONLY       AL COLONOSCOPY FLX DX W/COLLJ SPEC WHEN PFRMD  2002     abd pain; normal per pt     AL RECTUM SURGERY PROCEDURE UNLISTED  , 2007    4th degree laceration from , repeat procedure for repair       Social History     Socioeconomic History     Marital status:      Spouse name: Not on file     Number of children: 2     Years of education: Not on file     Highest education level: Not on file   Occupational History     Occupation: Homemaker   Social Needs     Financial resource strain: Not on file     Food insecurity     Worry: Not on file     Inability: Not on file     Transportation needs     Medical: Not on file     Non-medical: Not on file   Tobacco Use     Smoking status: Never Smoker     Smokeless tobacco: Never Used   Substance and Sexual Activity     Alcohol use: Yes     Comment: social drink 1-2/month     Drug use: No     Sexual activity: Never   Lifestyle     Physical activity     Days per week: Not on file     Minutes per session: Not on file     Stress: Not on file   Relationships     Social connections     Talks on phone: Not on file     Gets together: Not on file     Attends Mosque service: Not on file     Active member of club or organization: Not on file     Attends meetings of clubs or organizations: Not on file     Relationship status: Not on file     Intimate partner violence     Fear of current or ex partner: Not on file     Emotionally abused: Not on file     Physically abused: Not on file     Forced sexual activity: Not on file   Other Topics Concern     Not on file   Social History Narrative     Not on file       Patient Care Team:  Dahlia Walton MD as PCP - General (Family Medicine)  Dahlia Walton MD as Assigned PCP          Objective:     Vitals:    20 1538   BP: 102/68   Pulse: 85   Resp: 18    Temp: 98.8  F (37.1  C)   TempSrc: Oral   SpO2: 97%   Weight: 145 lb 6.4 oz (66 kg)         Physical Exam:  Physical Exam  Vitals signs reviewed.   Constitutional:       General: She is not in acute distress.     Appearance: She is well-developed.   HENT:      Head: Normocephalic and atraumatic.      Right Ear: External ear normal.      Left Ear: External ear normal.      Nose: Nose normal.      Mouth/Throat:      Pharynx: No oropharyngeal exudate.   Eyes:      General: No scleral icterus.        Right eye: No discharge.         Left eye: No discharge.      Conjunctiva/sclera: Conjunctivae normal.      Pupils: Pupils are equal, round, and reactive to light.   Neck:      Musculoskeletal: Normal range of motion.      Thyroid: No thyromegaly.   Cardiovascular:      Rate and Rhythm: Normal rate and regular rhythm.      Heart sounds: Normal heart sounds. No murmur. No friction rub. No gallop.    Pulmonary:      Effort: Pulmonary effort is normal. No respiratory distress.      Breath sounds: Normal breath sounds. No wheezing.   Abdominal:      General: There is no distension.      Palpations: Abdomen is soft. There is no mass.      Tenderness: There is no abdominal tenderness.   Musculoskeletal: Normal range of motion.   Lymphadenopathy:      Cervical: No cervical adenopathy.   Skin:     General: Skin is warm.   Neurological:      Mental Status: She is alert and oriented to person, place, and time.      Cranial Nerves: No cranial nerve deficit.      Motor: No abnormal muscle tone.      Deep Tendon Reflexes: Reflexes are normal and symmetric.   Psychiatric:         Behavior: Behavior normal.         Thought Content: Thought content normal.         Judgment: Judgment normal.       There are no Patient Instructions on file for this visit.    EKG: Not indicated.    Labs:  No labs were ordered during this visit    Immunization History   Administered Date(s) Administered     Hep A, Adult IM (19yr & older) 04/11/2007,  01/07/2008     Influenza, inj, historic,unspecified 11/20/2012     Influenza,live, Nasal Laiv4 10/25/2014     Influenza,seasonal quad, PF, =/> 6months 11/08/2018     Influenza,seasonal, Inj IIV3 11/21/2005, 10/17/2006, 10/03/2013     Influenza,seasonal,quad inj =/> 6months 09/09/2015, 10/24/2016, 10/08/2019     Td, Adult, Absorbed 06/27/2005     Tdap 04/14/2014       Electronically signed by Nemesio Dickerson MD 05/27/20 3:37 PM

## 2021-06-08 NOTE — PROGRESS NOTES
Pre-Procedure Negative COVID Test     Shyann Jurado  COVID-19 PCR Results    COVID-19 PCR Results 5/26/20 2019-nCOV Not Detected      Comments are available for some flowsheets but are not being displayed.             Patient Notification  Patient notified of the negative COVID test result    Patient Information  Patient informed of the no visitor policy  Patient instructed to continue to self-quarantine prior to procedure  Patient informed to contact the ordering provider if the following symptoms develop prior to procedure:   Fever  Cough  Shortness of Breath  Sore throat   Runny or stuffy nose  Muscle or body aches  Headaches  Fatigue  Vomiting or diarrhea   Rash    Steve Ruvalcaba

## 2021-06-10 NOTE — PROGRESS NOTES
SUBJECTIVE:   Chief Complaint   Patient presents with     Weight Loss     c/o losing 17 pounds in the past 5-6 weeks; thinks it may be anxiety related--due to sleep issues, unable to fall asleep, waking up in middle of night; same activity levels as before     Shyann RAINES Mitchjae 42 y.o. female    Current Outpatient Prescriptions   Medication Sig Dispense Refill     clindamycin (CLEOCIN T) 1 % lotion        doxylamine (UNISON) 25 mg tablet Take 25 mg by mouth at bedtime as needed for sleep.       drospirenone-ethinyl estradiol (YAO, 28,) 3-0.02 mg per tablet Take one active pill daily for 4 months, then one pill-free week for period, then repeat 5 Package 3     ibuprofen (ADVIL,MOTRIN) 800 MG tablet Give 800mg now       melatonin 3 mg Tab tablet Take 6 mg by mouth bedtime as needed.       MV-MN/IRON FUM/FA/OMEGA3,6,9#3 (WOMEN'S MULTI ORAL) Take 1 tablet by mouth daily.       naproxen sodium (ALEVE) 220 MG tablet PRN only       tretinoin (RETIN-A) 0.1 % cream        VITAMIN D2 50,000 unit capsule TAKE 1 CAPSULE BY MOUTH TWICE PER WEEK 25 capsule 0     sertraline (ZOLOFT) 25 MG tablet Take one daily for one week, then if tolerating, increase to two daily 60 tablet 1     No current facility-administered medications for this visit.      Allergies: Erythromycin and Erythromycin base   No LMP recorded (lmp unknown). Patient is not currently having periods (Reason: Contraception).    HPI:   42-year-old here for feeling rundown, poor appetite, weight loss, and anxiety.  Patient has been feeling unwell for at least a few weeks now, and recently came to the realization that it could be due to anxiety.  Her daughter has been struggling with PANDAS, a new diagnosis to them, and they are having trouble finding an appropriate provider to help them manage this, since many are not familiar with it.  Patient states she tends to want to have everything very well controlled, and it has been very difficult for her to not be able to fix  this.  She is staying awake at night almost obsessing about finding more information about this and coming up with a plan.  She just wants to make everything better.  She is tearful discussing it.    She is interested in starting a medication for anxiety.  She has been treated in the past, but was not happy that she ended up on multiple medications, some simply to combat side effects of some of the other medications.  She was on Lexapro for mood, started on levothyroxine for energy, was prescribed Adderall for focus and concentration as well as energy, and also needed a prescription sleep medication.  She decided on her own to stop everything and was doing well for a period of time without these medications.  She is frustrated that it was difficult to get off the sleep medication, Lunesta, and she does not want to get into that situation again.    She recalls that Zoloft was used in the past and she believes it was helpful without side effects.    She recently had thyroid labs, about 2 months after stopping her levothyroxine.  TSH and free T4 were normal.  Hemoglobin was normal, ferritin was slightly elevated.  Vitamin D was normal.  She was diagnosed 8 days ago with strep pharyngitis, and symptoms had started the day before that.  She was prescribed Augmentin and finishes that in a couple of days.    ROS: as per HPI    OBJECTIVE:   /66 (Patient Site: Right Arm, Patient Position: Sitting, Cuff Size: Adult Regular)  Pulse 72  Temp 98.7  F (37.1  C) (Oral)   Resp 16  Wt 132 lb 12.8 oz (60.2 kg)  LMP  (LMP Unknown)  BMI 23.34 kg/m2    General: Pleasant, well-appearing, in no acute distress.  Psychiatric: Presents on time, well groomed.  Normal speech and thought content.  Good eye contact.  Full affect.  Tearful when discussing stress and anxiety related to her daughter's health    ASSESSMENT/PLAN  1.  Anxiety  Start sertraline 25 mg daily for a week and then increase to 50 mg daily if tolerating.  Let me  know 2-4 weeks after that how it is going, sooner if problems or concerns.  Potential adverse effects discussed.  Advised she monitor her weight at home, and if not stabilizing and improving over the next month with treatment for anxiety, recommend further evaluation for weight loss.  Discussed that I could also refer her to a therapist but she declines at this time.    2.  Fatigue  She lives with an area with Lyme's disease and requests blood work for this with her upcoming labs.  Her symptoms have been chronic.  - Lyme Antibody Cascade; Future     3.  Elevated ferritin  Could have been related to strep pharyngitis that she was recently diagnosed with around the same time.  She will return in 2-3 weeks for repeat ferritin, hepatic profile, CRP, iron studies to further evaluate elevated ferritin level    25 minutes spent, greater than 50% in discussion and counseling regarding the above.

## 2021-06-11 NOTE — PROGRESS NOTES
Per lab note :     Dahlia Walton MD   2/29/2020  4:47 PM       Labs are fine labs are fine overall.  Cholesterol is mildly elevated, continue with healthy diet and exercise.  Looks like you have not had the hepatitis B shots.  You can set nurse appointments for these if you wish.          Please sign order. Appointment this afternoon 9/22/20 at 1:30 PM

## 2021-06-11 NOTE — CONSULTS
[facility] Hematology and Oncology Consult Note    Patient: Shyann Jurado  MRN: [id number]  Date of Service: 09/03/2020      Reason for Visit:    1.  Iron deficiency    Assessment/Plan:    1.  Iron deficiency anemia: She has not had iron in a few years.  She is feeling more symptomatic and her ferritin is trending down.  We will give her 2 doses of Injectafer.  We will have her return to clinic for labs only about 10 weeks after.  She can then return to the care of her primary care provider.  I let her know that she can call us for iron infusions whenever she becomes symptomatic or develops low indices in the future.    ECOG Performance   ECOG Performance Status: 0    Distress Assessment  Distress Assessment Score: 2    Problem List:    1. Other iron deficiency anemia       Staging History:    Cancer Staging  No matching staging information was found for the patient.    History:    Shyann is a 45-year-old woman who was referred to our clinic for low iron.  We saw her in 2015 and gave her a course of iron sucrose.  More recently she has been having more fatigue and restless leg symptoms.  She is feeling like she needs iron again.  Recent labs showed a ferritin of the low end of normal.  No other acute complaints today.    Past History:    Past Medical History:   Diagnosis Date     Anemia      Aneurysm (H)      Brain aneurysm      Colon polyps      Fatigue      Hypothyroid      Vitamin D deficiency     Family History   Problem Relation Age of Onset     Gallbladder disease Maternal Aunt 75     Colon cancer Paternal Aunt 50     Kidney cancer Maternal Grandmother 70     Colon cancer Maternal Grandfather 84     Prostate cancer Paternal Grandfather 80     Kidney cancer Paternal Grandfather      Colon cancer Paternal Grandfather 78     Melanoma Paternal Aunt      Aneurysm Paternal Aunt         Brain     Melanoma Paternal Aunt 48     Aneurysm Mother      Melanoma Father      Other Daughter         PANDAS     Other Son          PANDAS     Aneurysm Maternal Uncle         Brain      [unfilled] Social History     Socioeconomic History     Marital status:      Spouse name: Not on file     Number of children: 2     Years of education: Not on file     Highest education level: Not on file   Occupational History     Occupation: Homemaker   Social Needs     Financial resource strain: Not on file     Food insecurity     Worry: Not on file     Inability: Not on file     Transportation needs     Medical: Not on file     Non-medical: Not on file   Tobacco Use     Smoking status: Never Smoker     Smokeless tobacco: Never Used   Substance and Sexual Activity     Alcohol use: Yes     Comment: social drink 1-2/month     Drug use: No     Sexual activity: Never   Lifestyle     Physical activity     Days per week: Not on file     Minutes per session: Not on file     Stress: Not on file   Relationships     Social connections     Talks on phone: Not on file     Gets together: Not on file     Attends Judaism service: Not on file     Active member of club or organization: Not on file     Attends meetings of clubs or organizations: Not on file     Relationship status: Not on file     Intimate partner violence     Fear of current or ex partner: Not on file     Emotionally abused: Not on file     Physically abused: Not on file     Forced sexual activity: Not on file   Other Topics Concern     Not on file   Social History Narrative     Not on file        Allergies:    Allergies   Allergen Reactions     Erythromycin Nausea And Vomiting     Topamax [Topiramate]      Hair loss     Review of Systems:    General  General (WDL): Exceptions to WDL  Fatigue: Yes - Chronic (Greater than 3 months)  ENT  ENT (WDL): All ENT elements are within defined limits  Respiratory  Respiratory (WDL): All respiratory elements are within defined limits  Cardiovascular  Cardiovascular (WDL): All cardiovascular elements are within defined limits  Endocrine  Endocrine (WDL): All  endocrine elements are within defined limits  Gastrointestinal  Gastrointestinal (WDL): Exceptions to WDL  Constipation: Yes - Recent (Less than 3 months)  Musculoskeletal  Musculoskeletal (WDL): All musculoskeletal elements are within defined limits  Neurological  Neurological (WDL): All neurological elements are within defined limits  Dominant Hand: Right  Psychological/Emotional  Psychological/Emotional (WDL): Exceptions to WDL  Insomnia: Yes - Chronic (Greater than 3 months)  Daytime sleepiness: Yes - Chronic (Greater than 3 months)  Hematological/Lymphatic  Hematological/Lymphatic (WDL): All hematological/lymphatic elements are within defined limits  Dermatological  Dermatologic (WDL): Exceptions to WDL  Hair Loss: Yes - Chronic (Greater than 3 months)  Genitourinary/Reproductive  Genitourinary/Reproductive (WDL): All genitourinary/reproductive elements are within defined limits  Reproductive (Females only)  Menstrual irritation or increase in discharge: No  Age at start of periods: 12  Last menstural cycle: 2 weeks ago  Number of pregnancies: 3  Age at first pregnancy: 31  Patient Pregnant?: No  Is the patient trying to get pregnant?: No  Is the patient on birth control: Yes  Pain  Currently in Pain: No/denies    Physical Exam:    Recent Vitals 9/3/2020   Weight 143 lbs 3 oz   BSA (m2) 1.7 m2   /77   Pulse 88   Temp 98.4   Temp src 1   SpO2 98   Some recent data might be hidden     General: patient appears stated age of 45 y.o.. Nontoxic and in no distress.   HEENT: Head: atraumatic, normocephalic. Sclerae anicteric.  Chest:  Normal respiratory effort  Cardiac:  No edema.   Abdomen: abdomen is non-distended  Extremities: normal tone and muscle bulk.  Skin: no lesions or rash. Warm and dry.   CNS: alert and oriented. Grossly non-focal.   Psychiatric: normal mood and affect.     Lab Results:    No results found for this or any previous visit (from the past 168 hour(s)).    Imaging Results:    No results  found.      Signed by: Wes Salas MD

## 2021-06-11 NOTE — PROGRESS NOTES
Pt arrived amb for her 2nd injectafer, pt is feeling well, NO problems , Went over today's plan of care, questions and follow up, IV started in rt ante, pt infused with  injectafer , Pt was observed or 1/2hr after, vs's IV was dc/ after, PT does not the AVS and left amb at 1005  Milana Hernandez

## 2021-06-11 NOTE — PROGRESS NOTES
"Pt arrives ambulatory to Infusion dept. Pt reports fatigue, \"brain fog\", and hairloss as primary symptomatic complaints. IV started in right AC. Pt tolerated injectafer well. VSS. Pt monitored post infusion. IV flushed, removed and covered. Pt aware of next appt and left dept ambulatory.   "

## 2021-06-13 NOTE — TELEPHONE ENCOUNTER
RN cannot approve Refill Request    RN can NOT refill this medication med is not covered by policy/route to provider. Last office visit: 11/21/2018 Dahlia Walton MD Last Physical: 2/27/2020 Last MTM visit: Visit date not found Last visit same specialty: 11/21/2018 Dahlia Walton MD.  Next visit within 3 mo: Visit date not found  Next physical within 3 mo: Visit date not found      Mena Calabrese, [facility] Triage/Med Refill 12/12/2020    Requested Prescriptions   Pending Prescriptions Disp Refills     ergocalciferol (ERGOCALCIFEROL) 1,250 mcg (50,000 unit) capsule [Pharmacy Med Name: VITAMIN D2 50,000IU (ERGO) CAP RX] 12 capsule 3     Sig: TAKE 1 CAPSULE BY MOUTH WEEKLY       There is no refill protocol information for this order

## 2021-06-15 NOTE — PROGRESS NOTES
[employer]  [address] [facility] [address]  Dept: [phone]  Dept Fax: [phone]  Primary Provider: Dahlia Walton MD  Pre-op Performing Provider: DAHLIA WALTON    PREOPERATIVE EVALUATION:  Today's date: 2/22/2021    Shyann Jurado is a 45 y.o. female who presents for a preoperative evaluation.    Surgical Information:  Surgery/Procedure: Blephaloplasty bilateral, and brow lift bilateral  Surgery Location: [facility]  Surgeon: Dr Peña  Surgery Date: 03/02/21  Time of Surgery: 8:00 am   Where patient plans to recover: At home with family  Fax number for surgical facility: [phone]    Type of Anesthesia Anticipated: Local with MAC    Assessment & Plan      The proposed surgical procedure is considered LOW risk.    Shyann was seen today for pre-op exam.    Diagnoses and all orders for this visit:    Preop examination  -     Hemoglobin  -     Pregnancy, Urine    Initiation of OCP (BCP)  -     drospirenone-ethinyl estradioL (DAMION) 3-0.03 mg per tablet; Take 1 tablet by mouth daily.    Ptosis of eyelid, bilateral    Brain aneurysm    Physical due May 27 or after please come fasting for that appointment.    Stable internal carotid artery aneurysm followed by interventional radiology.  Likely MRA or CTA or angiogram is due in May.  Please contact interventional radiology to see which type of imaging or assessment is needed for this.     No ibuprofen or vitamins or aspirin for 7 days before surgery.  Tylenol is safe.    We can discuss the midcycle spotting at your physical.       Risks and Recommendations:  The patient has the following additional risks and recommendations for perioperative complications:   - No identified additional risk factors other than previously addressed    Medication Instructions:  No ibuprofen or vitamins or aspirin for 7 days before surgery.  Tylenol is safe.    RECOMMENDATION:  APPROVAL GIVEN to proceed with proposed procedure,  without further diagnostic evaluation.       30 minutes spent on the date of the encounter doing chart review, history and exam, documentation and further activities as noted above        Subjective     HPI related to upcoming procedure: 45-year-old female with right eye ptosis.  No vision concerns.  No cardiac or pulmonary history.  No chest pain or shortness of breath on exertion.    Preop Questions 2/22/2021   Have you ever had a heart attack or stroke? No   Have you ever had surgery on your heart or blood vessels, such as a stent placement, a coronary artery bypass, or surgery on an artery in your head, neck, heart, or legs? No   Do you have chest pain with activity? No   Do you have a history of  heart failure? No   Do you currently have a cold, bronchitis or symptoms of other infection? No   Do you have a cough, shortness of breath, or wheezing? No   Do you or anyone in your family have previous history of blood clots? No   Do you or does anyone in your family have a serious bleeding problem such as prolonged bleeding following surgeries or cuts? No   Have you ever had problems with anemia or been told to take iron pills? YES - Iron deficiency   Have you had any abnormal blood loss such as black, tarry or bloody stools, or abnormal vaginal bleeding? YES - midcycle bleeding, no heavy   Have you ever had a blood transfusion? No   Are you willing to have a blood transfusion if it is medically needed before, during, or after your surgery? Yes   Have you or any of your relatives ever had problems with anesthesia? No   Do you have sleep apnea, excessive snoring or daytime drowsiness? No   Do you have any artifical heart valves or other implanted medical devices like a pacemaker, defibrillator, or continuous glucose monitor? No   Do you have artificial joints? No   Are you allergic to latex? No   Is there any chance that you may be pregnant? No     Health Care Directive:  Patient does not have a Health Care Directive  or Living Will: Patient states has Advance Directive and will bring in a copy to clinic.  69}  See problem list for active medical problems.  Problems all longstanding and stable, except as noted/documented.  See ROS for pertinent symptoms related to these conditions.      Review of Systems  CONSTITUTIONAL: NEGATIVE for fever, chills, change in weight  INTEGUMENTARY/SKIN: NEGATIVE for worrisome rashes, moles or lesions  EYES: NEGATIVE for vision changes or irritation  ENT/MOUTH: NEGATIVE for ear, mouth and throat problems  RESP: NEGATIVE for significant cough or SOB  BREAST: NEGATIVE for masses, tenderness or discharge  CV: NEGATIVE for chest pain, palpitations or peripheral edema  GI: NEGATIVE for nausea, abdominal pain, heartburn, or change in bowel habits  : NEGATIVE for frequency, dysuria, or hematuria  MUSCULOSKELETAL: NEGATIVE for significant arthralgias or myalgia  NEURO: NEGATIVE for weakness, dizziness or paresthesias  ENDOCRINE: NEGATIVE for temperature intolerance, skin/hair changes  HEME: NEGATIVE for bleeding problems  PSYCHIATRIC: NEGATIVE for changes in mood or affect    Patient Active Problem List    Diagnosis Date Noted     Brain aneurysm 05/15/2019     History of colonic polyps 2018     Hypothyroid 2015     Iron deficiency anemia- possible IRIDA 2015     Vitamin D Deficiency      Fatigue      Past Medical History:   Diagnosis Date     Anemia      Aneurysm (H)      Brain aneurysm      Colon polyps      Fatigue      Hypothyroid      Vitamin D deficiency      Past Surgical History:   Procedure Laterality Date     FINGER TENDON REPAIR  1991     IR CEREBRAL ANGIOGRAM  2019     IR CEREBRAL ANGIOGRAM  2020     WOLF'S NEUROMA Left      [place]  DELIVERY [place]       [place] COLONOSCOPY FLX DX W/COLLJ SPEC WHEN PFRMD  2002     abd pain; normal per pt     [place] RECTUM SURGERY PROCEDURE UNLISTED  , 2007    4th degree laceration from , repeat procedure for repair      Current Outpatient Medications   Medication Sig Dispense Refill     cyclobenzaprine (FLEXERIL) 5 MG tablet TAKE 1 TABLET PO TID PRN FOR MUSCLE SPASMS.       doxylamine (UNISON) 25 mg tablet Take 25 mg by mouth at bedtime as needed for sleep.       drospirenone-ethinyl estradioL (DAMION) 3-0.03 mg per tablet Take 1 tablet by mouth daily. 84 tablet 3     ergocalciferol (ERGOCALCIFEROL) 1,250 mcg (50,000 unit) capsule TAKE 1 CAPSULE BY MOUTH WEEKLY 12 capsule 3     ibuprofen (ADVIL,MOTRIN) 800 MG tablet Take 800 mg by mouth every 8 (eight) hours as needed. Give 800mg now       melatonin 3 mg Tab tablet Take 3 mg by mouth at bedtime as needed.              NURTEC ODT 75 mg TbDL DISSOLVE ONE TABLET BY MOUTH AS NEEDED FOR HEADACHE       SUMAtriptan (IMITREX) 25 MG tablet Take 25 mg by mouth every 2 (two) hours as needed for migraine.       SUMAtriptan (IMITREX) 6 mg/0.5 mL Soln Inject 6 mg under the skin every 2 (two) hours as needed.       No current facility-administered medications for this visit.        Allergies   Allergen Reactions     Erythromycin Nausea And Vomiting     Topamax [Topiramate]      Hair loss       Social History     Tobacco Use     Smoking status: Never Smoker     Smokeless tobacco: Never Used   Substance Use Topics     Alcohol use: Yes     Comment: social drink 1-2/month      Family History   Problem Relation Age of Onset     Gallbladder disease Maternal Aunt 75     Colon cancer Paternal Aunt 50     Kidney cancer Maternal Grandmother 70     Colon cancer Maternal Grandfather 84     Prostate cancer Paternal Grandfather 80     Kidney cancer Paternal Grandfather      Colon cancer Paternal Grandfather 78     Melanoma Paternal Aunt      Aneurysm Paternal Aunt         Brain     Melanoma Paternal Aunt 48     Aneurysm Mother      Melanoma Father      Other Daughter         PANDAS     Other Son         PANDAS     Aneurysm Maternal Uncle         Brain     Social History     Substance and Sexual Activity  "  Drug Use No        Objective     /71 (Patient Site: Left Arm, Patient Position: Sitting, Cuff Size: Adult Regular)   Pulse 84   Temp 98.1  F (36.7  C) (Oral)   Resp 16   Ht 5' 2.5\" (1.588 m)   Wt 151 lb 2 oz (68.5 kg)   BMI 27.20 kg/m    Physical Exam    GENERAL APPEARANCE: healthy, alert and no distress     EYES: EOMI, PERRL     HENT: ear canals and TM's normal and nose and mouth without ulcers or lesions     NECK: no adenopathy, no asymmetry, masses, or scars and thyroid normal to palpation     RESP: lungs clear to auscultation - no rales, rhonchi or wheezes     CV: regular rates and rhythm, normal S1 S2, no S3 or S4 and no murmur, click or rub     ABDOMEN:  soft, nontender, no HSM or masses and bowel sounds normal     MS: extremities normal- no gross deformities noted, no evidence of inflammation in joints, FROM in all extremities.     SKIN: no suspicious lesions or rashes     NEURO: Normal strength and tone, sensory exam grossly normal, mentation intact and speech normal     PSYCH: mentation appears normal. and affect normal/bright     LYMPHATICS: No cervical adenopathy    Recent Labs   Lab Test 08/11/20  1400 05/29/20  1216 02/27/20  1057   HGB 12.2 11.6* 12.4   PLT  --  253 228   NA  --   --  137   K  --  4.9 4.6   CREATININE  --  0.73 0.72        PRE-OP Diagnostics:   Labs pending at this time. Results will be reviewed when available.  No EKG required for low risk surgery (cataract, skin procedure, breast biopsy, etc).    REVISED CARDIAC RISK INDEX (RCRI)   The patient has the following serious cardiovascular risks for perioperative complications:   - No serious cardiac risks = 0 points    RCRI INTERPRETATION: 0 points: Class I (very low risk - 0.4% complication rate)           Signed Electronically by: Dahlia Walton MD    Copy of this evaluation report is provided to requesting physician.    [facility] Guidelines    Revised Cardiac Risk Index  "

## 2021-06-15 NOTE — TELEPHONE ENCOUNTER
Attempted call. Only rang. No voicemail option.     OK to schedule with any available provider. If patient insists on seeing PCP, please use 2 same day or reserved slots for 40 minutes.

## 2021-06-15 NOTE — PATIENT INSTRUCTIONS - HE
Physical due May 27 or after please come fasting for that appointment.    Stable internal carotid artery aneurysm followed by interventional radiology.  Likely MRA or CTA or angiogram is due in May.  Please contact interventional radiology to see which type of imaging or assessment is needed for this.     No ibuprofen or vitamins or aspirin for 7 days before surgery.  Tylenol is safe.    We can discuss the midcycle spotting at your physical.

## 2021-06-15 NOTE — TELEPHONE ENCOUNTER
New Appointment Needed  What is the reason for the visit:    Pre-Op Appt Request  When is the surgery? :  3/2/2021  Where is the surgery?:   [facility]  Who is the surgeon? :  Dr Chayo Peña  What type of surgery is being done?: bilateral eye surgery  Provider Preference: Any available  How soon do you need to be seen?: before 3/2  Waitlist offered?: No  Okay to leave a detailed message:  Yes

## 2021-06-15 NOTE — TELEPHONE ENCOUNTER
Dr Walton,    OK to use two consecutive same day appts or should we schedule with a partner?    Thank you!

## 2021-06-15 NOTE — PROGRESS NOTES
ASSESSMENT & PLAN:    1. Healthcare maintenance  Pap with HPV.  Mammogram up to date. Colonoscopy due next fall, patient advised. Fasting lipids and glucose.  - Comprehensive Metabolic Panel  - Lipid Cascade  - Gynecologic Cytology (PAP Smear)    2. Contraception  Takes continuously due to dysmenorrhea. Cannot take generic because it gives her headaches. Prior auth has been done for Yao. Refilled for the year.  - YAO, 28, 3-0.02 mg per tablet; TAKE 1 TABLET BY MOUTH OF ACTIVE PILLS DAILY FOR 4 MONTHS THEN 1 PILL-FREE WEEK FOR PERIOD THEN REPEAT. FREDO  Dispense: 4 Package; Refill: 4    3. Vitamin D deficiency  Previously 50,000 IU twice weekly through Dr. Dorsey, but will need new rx after level checked.  - Vitamin D, Total (25-Hydroxy)    4. Anemia  Recheck labs. If iron/ferritin low, return to hematology for iron infusions.  - Ferritin  - HM1(CBC and Differential)  - HM1 (CBC with Diff)    5. Hypothyroidism  Increase synthroid (FREDO) to 25mcg alternating with 50mcg, and check labs in about 6 weeks, sooner if concerns.     Doing well without SSRI for anxiety.     There are no Patient Instructions on file for this visit.    Orders Placed This Encounter   Procedures     Comprehensive Metabolic Panel     Lipid Cascade     Order Specific Question:   Fasting is required?     Answer:   Yes     Vitamin D, Total (25-Hydroxy)     Ferritin     HM1 (CBC with Diff)     Medications Discontinued During This Encounter   Medication Reason     sertraline (ZOLOFT) 50 MG tablet Therapy completed     MV-MN/IRON FUM/FA/OMEGA3,6,9#3 (WOMEN'S MULTI ORAL) Therapy completed     naproxen sodium (ALEVE) 220 MG tablet Therapy completed     tretinoin (RETIN-A) 0.1 % cream Therapy completed     levothyroxine (SYNTHROID) 25 MCG tablet Reorder     YAO, 28, 3-0.02 mg per tablet Reorder       No Follow-up on file.    CHIEF COMPLAINT:  Chief Complaint   Patient presents with     Annual Exam     fasting labs today; no concerns; fever 10 days ago--poss  flu? sx of body aches, fevers, congestion       HISTORY OF PRESENT ILLNESS:  Shyann is a 42 y.o. female presenting to the clinic today for a physical examination.    Hypothyroidism: She continues on levothyroxine 25mcg daily recently started after a Phone.comt message. She restarted this medication a few weeks ago. She does not feel much of a difference since she restarted her medication. She reports previously 50mcg daily alternating with 25mcg daily worked best for her.    Health Maintenance: She is due for pap; her last was done by her gynecologist in 2015, normal negative HPV. Pap was collected today. She has had an abnormal pap in the past; colposcopy but no treatment. She is up to date on immunizations. She is up to date on mammogram; last was 10/31/17, normal, repeat yearly. She is due for a colonoscopy in November; last was 16, polyp, family history of colon cancer; repeat every 2 years.    Anemia: She no longer follows with hematology. She would like her iron and ferritin levels checked today.    Vitamin D Deficiency: She is not currently taking any vitamin D supplement. She was on 50,000 units D2 twice a week in the past. She no longer follows with endocrinology.     Oral Contraception: She continues on Irma. Her last period was on , when she ran out of her medication. Her period was lighter than normal. She believes her mother had a normal menopause.    Anxiety: She no longer follows with psychiatry. She was on Zoloft until 2017. Discontinued and doing well.     REVIEW OF SYSTEMS:   She continues to follow dermatology. She has had spots removed on her face. She had the flu 10 days ago; she still feels chest congestion occasionally. All other systems are negative.    PFSH:  Family: Father with melanoma. Daughter and son with PANDAS. Paternal grandfather and aunt  from colon cancer. Negative for breast and ovarian cancer.    Social History     Social History     Marital  status:      Spouse name: N/A     Number of children: 2     Years of education: N/A     Occupational History     Homemaker      Social History Main Topics     Smoking status: Never Smoker     Smokeless tobacco: Never Used     Alcohol use Yes      Comment: social drink 1-2/month     Drug use: No     Sexual activity: No     Other Topics Concern     Not on file     Social History Narrative       Past Medical History:   Diagnosis Date     Anemia        Past Surgical History:   Procedure Laterality Date     FINGER TENDON REPAIR  1991     [place]  DELIVERY ONLY      Description:  Section;  Recorded: 2012;     [place] COLONOSCOPY FLX DX W/COLLJ SPEC WHEN PFRMD      Description: Complete Colonoscopy;  Recorded: 2012;  Comments:  for abd pain; normal per pt     [place] RECTUM SURGERY PROCEDURE UNLISTED      Description: Rectal Surgery;  Recorded: 2012;  Comments: 4th degree laceration from  , repeat procedure for repair in 2007       Allergies   Allergen Reactions     Erythromycin Nausea And Vomiting     Erythromycin Base Nausea And Vomiting     Comment: Stomach Pain, Description:        Active Ambulatory Problems     Diagnosis Date Noted     Major Depression, Recurrent      Vitamin D Deficiency      Fatigue      Overweight      High risk medication use 2015     Iron deficiency anemia- possible IRIDA 2015     Hypothyroid 2015     Resolved Ambulatory Problems     Diagnosis Date Noted     Streptococcal sore throat      Acute Sore Throat      Past Medical History:   Diagnosis Date     Anemia        Family History   Problem Relation Age of Onset     Gallbladder disease Maternal Aunt 75     Colon cancer Paternal Aunt 50     Kidney cancer Maternal Grandmother 70     Colon cancer Maternal Grandfather 84     Prostate cancer Paternal Grandfather 80     Kidney cancer Paternal Grandfather      Colon cancer Paternal Grandfather 78     Melanoma Maternal Aunt      Melanoma  "Paternal Aunt 48     Melanoma Father      Other Daughter      ELIAS     Other Son      ELIAS     Breast cancer Neg Hx      Ovarian cancer Neg Hx        VITALS:  Vitals:    01/30/18 0847   BP: (!) 88/62   Patient Site: Right Arm   Patient Position: Sitting   Cuff Size: Adult Regular   Pulse: 60   Resp: 16   Temp: 98.9  F (37.2  C)   TempSrc: Oral   Weight: 147 lb 8 oz (66.9 kg)   Height: 5' 3\" (1.6 m)     Wt Readings from Last 3 Encounters:   01/30/18 147 lb 8 oz (66.9 kg)   04/11/17 132 lb 12.8 oz (60.2 kg)   10/24/16 147 lb 12 oz (67 kg)       PHYSICAL EXAM:  GENERAL: Pleasant, well-appearing woman in no acute distress.  VITAL SIGNS:  Reviewed.  HEENT: Pupils are equal, round, and reactive to light. Sclerae and  conjunctivae clear. TMs are clear bilaterally. Oropharynx is clear with moist mucous membranes.  NECK: Supple without lymphadenopathy or thyromegaly. No carotid bruits.  CARDIOVASCULAR:  Heart regular rate and rhythm without murmur. Normal S1 and S2.  LUNGS: Clear to auscultation bilaterally without wheezes or crackles. Good air movement throughout.  BREASTS: Normal contours. No skin dimpling, nipple retraction or nipple  discharge. Palpation reveals no masses, no supraclavicular or axillary  lymphadenopathy.    ABDOMEN:  Soft, nontender, and nondistended without guarding or rebound. No organomegaly.  PELVIS: Normal female external genitalia.  No skin lesions.  Speculum exam reveals normal-appearing cervix and normal vaginal mucosa. Pap smear collected. Bimanual exam reveals no cervical motion tenderness, no uterine or adnexal masses or tenderness.  EXTREMITIES: Warm and well perfused without edema. Dorsalis pedis pulses easily palpable and symmetric bilaterally.  NEURO: Alert and oriented. Grossly nonfocal.  PSYCHIATRIC: Presents on time and well groomed.  Normal speech and thought content. Full affect. No abnormal movements or behaviors noted.     DATA REVIEWED:  ADDITIONAL HISTORY SUMMARIZED (2): " Reviewed 10/24/16 physical regarding anemia and oral contraceptives.  DECISION TO OBTAIN EXTRA INFORMATION (1): None.   RADIOLOGY TESTS SUMMARIZED OR ORDERED (1): None.  LABS REVIEWED OR ORDERED (1): Labs were ordered today.  MEDICINE TESTS SUMMARIZED OR ORDERED (1): None.  INDEPENDENT REVIEW OF EKG OR X-RAY (2 EACH): None.     The visit lasted a total of 30 minutes face to face with the patient. Over 50% of the time was spent counseling and educating the patient about health maintenance.    IEllie, am scribing for and in the presence of, Dr. Kumar.    I, Dr. Kumar, personally performed the services described in this documentation, as scribed by Ellie Castro in my presence, and it is both accurate and complete.    MEDICATIONS:  Current Outpatient Prescriptions   Medication Sig Dispense Refill     clindamycin (CLEOCIN T) 1 % lotion        doxylamine (UNISON) 25 mg tablet Take 25 mg by mouth at bedtime as needed for sleep.       ibuprofen (ADVIL,MOTRIN) 800 MG tablet Give 800mg now       levothyroxine (SYNTHROID) 25 MCG tablet 25mcg alternating with 50mcg daily. FREDO synthroid 135 tablet 0     melatonin 3 mg Tab tablet Take 6 mg by mouth bedtime as needed.       YAO, 28, 3-0.02 mg per tablet TAKE 1 TABLET BY MOUTH OF ACTIVE PILLS DAILY FOR 4 MONTHS THEN 1 PILL-FREE WEEK FOR PERIOD THEN REPEAT. FREDO 4 Package 4     [START ON 2/1/2018] ergocalciferol (ERGOCALCIFEROL) 50,000 unit capsule Take 1 capsule (50,000 Units total) by mouth 2 (two) times a week. 24 capsule 0     No current facility-administered medications for this visit.          Total Data Points: 3

## 2021-06-16 NOTE — TELEPHONE ENCOUNTER
Telephone Encounter by Adamaris Telles at 3/20/2019  8:30 AM     Author: Adamaris Telles Service: -- Author Type: --    Filed: 3/20/2019  8:41 AM Encounter Date: 3/14/2019 Status: Signed    : Adamaris Telles       PRIOR AUTHORIZATION DENIED    Denial Rational:       Appeal Information:

## 2021-06-17 NOTE — PROGRESS NOTES
Assessment: Medication check for Phentermine  1. High risk medication use         Plan: The following high BMI interventions were performed this visit: encouragement to exercise and weight monitoring  Stop med and notify MD if any chest pain, palpations or shortness of breath. Follow-up in 1 month. This is month # 1. Phentermine dose is 15 mg.    Patient Instructions   Eat 1400 to 1500 calories a day.     No Sudafed and no caffeine while on phentermine.     Start phentermine 15mg and topimax 25mg daily. Take in the morning.     Follow up in 2-3 months.     If you would like a dose increase in a month, message us to change dosing.             Chief Complaint   Patient presents with     Weight Gain     pt was on medication for weight loss/weight management and would like to discuss restarting       Subjective: Shyann Jurado comes in today for a medication check for Phentermine. No chest pain, palpations or shortness of breath. They have been on the med for  0 months. They have lost 0 pounds in the last month and 0 pounds overall. They use a elliptical 2-3 times a week and does light strength training 2-3 times a week. They are not journaling calories,however, eat around 8460-1266 calories a day. She denies history of seizures,however, had a dry mouth while on phentermine. Her lowest weight was 125.     Weight Gain:  A year ago, she had lost 20 pounds due to loss of appetite because of anxiety caused by her daughter's diagnosis of Pediatric Autoimmune Neuropsychiatric Disorders Associated with Streptococcal Infections and having polyps removed. She took Zoloft and felt groggy while on the medication, but was able to return to normal eating habits. She is amenable to starting phentermine, however, is interested in start Qsymia since she has a history of migraines caused by stimulants and hormone changes.      Colon Polyps: One 16-20 mm polyp was found in sigmoid-distal and was removed with hot snare on 11/28/2016.  Otherwise, colonoscopy was normal.      Hypothyroid: TSH was 1.85 on 3/21/18. She is on levothyroxine and reports no adverse side effects to medication.       Hyperlipidemia:Cholesterol was 239 on 1/30/18. She is wondering if medication would be recommended.      ROS: She takes one high dose vitamin D every other week. Review of Systems - General ROS: negative      PFSH:   Family: Her daughter has PANDAS.      Objective: /72 (Patient Site: Right Arm, Patient Position: Sitting, Cuff Size: Adult Regular)  Pulse 70  Resp 16  Wt 149 lb 7 oz (67.8 kg)  BMI 26.47 kg/m2  General: No apparent distress  CV: Regular rate and rhythm without murmur  Lungs: Clear to auscultation bilaterally    ADDITIONAL HISTORY SUMMARIZED (2): Reviewed Colonoscopy on 11/28/2016 normal, polyp removed.   DECISION TO OBTAIN EXTRA INFORMATION (1): None.   RADIOLOGY TESTS (1): None.  LABS (1): Cholesterol 239 on 1/30/18. TSH on 3/21/18 was 1.85.   MEDICINE TESTS (1): None.  INDEPENDENT REVIEW (2 each): None.     The visit lasted a total of 28 minutes face to face with the patient. Over 50% of the time was spent counseling and educating the patient about phentermine check and weight gain.    I, Casie Henry, am scribing for and in the presence of, Dr. Dahlia Walton.    I, Dr. Dahlia Walton MD, personally performed the services described in this documentation, as scribed by Casie Henry in my presence, and it is both accurate and complete.    Data Points: 3

## 2021-06-17 NOTE — PROGRESS NOTES
Assessment/Plan:  1. Routine general medical examination at a health care facility  Gynecologic Cytology (PAP Smear)    Lipid [place]    Comprehensive Metabolic Panel    HM2(CBC w/o Differential)   2. Vitamin D deficiency  Vitamin D, Total (25-Hydroxy)   3. Hypothyroidism, unspecified type  Thyroid Cascade    Thyroid Peroxidase Antibody   4. Brain aneurysm-Right ICA     5. Other iron deficiency anemia     6. History of colonic polyps       3rd Hepatis B shot when  Wishes.    Seeing hematology for iron infusions.    Seeing neurosurgery and having annual monitoring of the right internal carotid artery aneurysm.    Sees dermatology yearly for full body skin checks.      Patient is a 46 y.o. female here for physical exam. See health maintenance section below. Labs as ordered.        HPI    Chief Complaint   Patient presents with     Annual Exam     w/ pap, pt is fasting     Midcycle bleeding.  Was taking hormone pills for 4 to 5 months and just having a bleed every 4 to 5 months but then having some midcycle bleeding.  Now that she has gone back to hormone pills for 3 months no pill for 1 week and then hormone pills again she is not having this breakthrough bleeding.    Health Maintenance   Topic Date Due     HEPATITIS C SCREENING  NA     HIV SCREENING  NA     PREVENTIVE CARE VISIT  Today     MAMMOGRAM  01/19/2022     PAP SMEAR  Today     HPV TEST  Today     COLORECTAL CANCER SCREENING  12/11/2023     TD 18+ HE  04/14/2024     LIPID  Today     ADVANCE CARE PLANNING  We would like a copy please     INFLUENZA VACCINE RULE BASED  Completed     TDAP ADULT ONE TIME DOSE  Completed     COVID-19 Vaccine  Completed     Pneumococcal Vaccine: Pediatrics (0 to 5 Years) and At-Risk Patients (6 to 64 Years)  NA     HEPATITIS B VACCINES  Third shot when wishes     Dexa: Down the road with menopause     Patient Active Problem List   Diagnosis     Vitamin D Deficiency     Fatigue     Iron deficiency anemia- possible IRIDA      Hypothyroid     History of colonic polyps     Brain aneurysm     Current Outpatient Medications   Medication Sig Note     cyclobenzaprine (FLEXERIL) 5 MG tablet TAKE 1 TABLET PO TID PRN FOR MUSCLE SPASMS.      doxylamine (UNISON) 25 mg tablet Take 25 mg by mouth at bedtime as needed for sleep.      drospirenone-ethinyl estradioL (DAMION) 3-0.03 mg per tablet Take 1 tablet by mouth daily.      ergocalciferol (ERGOCALCIFEROL) 1,250 mcg (50,000 unit) capsule TAKE 1 CAPSULE BY MOUTH WEEKLY      ibuprofen (ADVIL,MOTRIN) 800 MG tablet Take 800 mg by mouth every 8 (eight) hours as needed. Give 800mg now 11/3/2015: Received from: [employer]     melatonin 3 mg Tab tablet Take 3 mg by mouth at bedtime as needed.             NURTEC ODT 75 mg TbDL DISSOLVE ONE TABLET BY MOUTH AS NEEDED FOR HEADACHE      SUMAtriptan (IMITREX) 25 MG tablet Take 25 mg by mouth every 2 (two) hours as needed for migraine.      SUMAtriptan (IMITREX) 6 mg/0.5 mL Soln Inject 6 mg under the skin every 2 (two) hours as needed.        Patient is a 46 y.o. female presents for a physical exam.    The following portions of the patient's history were reviewed and updated as appropriate: past medical history, past social history, past surgical history and problem list.    Review of Systems  Pertinent items are noted in HPI.  Immunization History   Administered Date(s) Administered     COVID-19,PF,Pfizer 03/24/2021, 04/14/2021     Hep A, Adult IM (19yr & older) 04/11/2007, 01/07/2008     Hep B, Adult 09/22/2020, 11/05/2020     INFLUENZA,RECOMBINANT,INJ,PF QUADRIVALENT 18+YRS 09/22/2020     INFLUENZA,SEASONAL QUAD, PF, =/> 6months 11/08/2018     Influenza, inj, historic,unspecified 11/20/2012     Influenza,live, Nasal Laiv4 10/25/2014     Influenza,seasonal, Inj IIV3 11/21/2005, 10/17/2006, 10/03/2013     Influenza,seasonal,quad inj =/> 6months 09/09/2015, 10/24/2016, 10/08/2019     Td, Adult, Absorbed 06/27/2005     Tdap 04/14/2014     No results  "found for this or any previous visit (from the past 240 hour(s)).  I have had an Advance Directives discussion with the patient.  Objective:    /73 (Patient Site: Left Arm, Patient Position: Sitting, Cuff Size: Adult Regular)   Pulse 73   Temp 97.9  F (36.6  C) (Oral)   Resp 16   Ht 5' 2.75\" (1.594 m)   Wt 142 lb 8 oz (64.6 kg)   BMI 25.44 kg/m        General Appearance:    Alert, cooperative, no distress, appears stated age   Head:    Normocephalic, without obvious abnormality, atraumatic   Eyes:    PERRL, conjunctiva/corneas clear, EOM's intact, fundi     benign, both eyes   Ears:    Normal TM's and external ear canals, both ears   Nose:   Nares normal, septum midline, mucosa normal, no drainage    or sinus tenderness   Throat:   Lips, mucosa, and tongue normal; teeth and gums normal   Neck:   Supple, symmetrical, trachea midline, no adenopathy;     thyroid:  no enlargement/tenderness/nodules; no carotid    bruit or JVD   Back:     Symmetric, no curvature, ROM normal, no CVA tenderness   Lungs:     Clear to auscultation bilaterally, respirations unlabored   Chest Wall:    No tenderness or deformity    Heart:    Regular rate and rhythm, S1 and S2 normal, no murmur, rub   or gallop   Breast Exam:    No tenderness, masses, or nipple abnormality   Abdomen:     Soft, non-tender, bowel sounds active all four quadrants,     no masses, no organomegaly   Genitalia:    Normal female without lesion, discharge or tenderness       Extremities:   Extremities normal, atraumatic, no cyanosis or edema   Pulses:   2+ and symmetric all extremities   Skin:   Skin color, texture, turgor normal, no rashes or lesions   Lymph nodes:   Cervical, supraclavicular, and axillary nodes normal   Neurologic:   CNII-XII intact, normal strength, sensation and reflexes     throughout          "

## 2021-06-17 NOTE — PATIENT INSTRUCTIONS - HE
3rd Hepatis B shot when  Wishes.    Seeing hematology for iron infusions.    Seeing neurosurgery and having annual monitoring of the right internal carotid artery aneurysm.    Sees dermatology yearly for full body skin checks.      Health Maintenance   Topic Date Due     HEPATITIS C SCREENING  NA     HIV SCREENING  NA     PREVENTIVE CARE VISIT  Today     MAMMOGRAM  01/19/2022     PAP SMEAR  Today     HPV TEST  Today     COLORECTAL CANCER SCREENING  12/11/2023     TD 18+ HE  04/14/2024     LIPID  Today     ADVANCE CARE PLANNING  We would like a copy please     INFLUENZA VACCINE RULE BASED  Completed     TDAP ADULT ONE TIME DOSE  Completed     COVID-19 Vaccine  Completed     Pneumococcal Vaccine: Pediatrics (0 to 5 Years) and At-Risk Patients (6 to 64 Years)  NA     HEPATITIS B VACCINES  Third shot when wishes     Dexa: Down the road with menopause

## 2021-06-17 NOTE — PROGRESS NOTES
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 Followed by Dr Sanchez.    She reports stable chronic migraines for which she is followed by a neurologist. She has no new symptoms to report. Feels well. Brain MRA from 5/5/21 images and report personally reviewed- aneurysm is stable at 1.3 x 1.7 x 2.1mm    We will continue to follow her annually. Plan to see her back for telephone call with NP/PA on [facility] clinic day in 1yr with new MRA, sooner if any questions or concerns.      Telephone call length 5mins, 15mins spent reviewing imaging    Nia HOLT-C  [facility]surgery  O. [phone]

## 2021-06-19 NOTE — PROGRESS NOTES
ASSESSMENT & PLAN:  1. History of colonic polyps  Ambulatory referral for Colonoscopy   2. Vitamin D deficiency  Vitamin D, Total (25-Hydroxy)   3. Other specified hypothyroidism  Thyroid Cascade       Patient Instructions   Start Belviq 10 mg in the morning with breakfast.    Can increase to twice a day if you are tolerating medication and if you need to. One with breakfast and dinner.     Colonoscopy ordered for Nov.    If wishing to continue Belviq, follow up in 3 months.     Physical in January.     We will continue weekly prescription of vitamin D as long as it is not to high and helping you feel better.    Labs as ordered.       Orders Placed This Encounter   Procedures     Vitamin D, Total (25-Hydroxy)     Thyroid [place]     Ambulatory referral for Colonoscopy     Referral Priority:   Routine     Referral Type:   Colonoscopy     Referral Reason:   Evaluation and Treatment     Requested Specialty:   Gastroenterology     Number of Visits Requested:   1     Medications Discontinued During This Encounter   Medication Reason     phentermine 15 mg TbDL      topiramate (TOPAMAX) 25 MG tablet        Return in about 3 months (around 10/12/2018) for Medication Check.    CHIEF COMPLAINT:  Chief Complaint   Patient presents with     Medication Management       HISTORY OF PRESENT ILLNESS:  Shyann is a 43 y.o. female presenting to the clinic today for overweight and colon polyps.     Overweight: She has discontinued phentermine 15 mg and topiramate 25 mg. Two weeks after starting medication, she had severe hair loss. She denies recent stressful events. She and her  researched side effects of topiramate and found hair loss was a side effect. She is inquiring about starting Belviq. She denies history of heart block, dementia, heart disease, heart failure, anemia, recent anxiety, recent depression, hypertension or taking antidepressant medication. ECHO on 8/3/2016 showed no significant valvular heart disease.  No chest  pain, palpations or shortness of breath. They have lost 5 pounds in the last 3 months. They exercise 45 minutes 3-4 days a week. They are journaling 7383-0666 calories. She would like to lose 9 more pounds.       Colon Polyps: Colonoscopy on 11/28/2016 found cecum polyp 16 mm which was removed. She is to follow up this Fall.     Vitamin D Deficiency: Vitamin D was 05/02/2018 was 34.3. She feels better when vitamin D levels are in 40s. Otherwise, she feels chronic fatigue. Her kids have low vitamin D and she is wondering if it is caused by low ferritin.         REVIEW OF SYSTEMS:   TSH was 2.01 on 05/02/2018. When taking levothyroxine, she experienced severe hair loss, however, it resolved when taking name brand Synthroid.   All other systems are negative.    PFSH:  Family: She has 12 and 9 year old.     TOBACCO USE:  History   Smoking Status     Never Smoker   Smokeless Tobacco     Never Used       VITALS:  Vitals:    07/12/18 1403   BP: 106/58   Patient Site: Left Arm   Patient Position: Sitting   Cuff Size: Adult Regular   Pulse: 74   Weight: 144 lb 6 oz (65.5 kg)     Wt Readings from Last 3 Encounters:   07/12/18 144 lb 6 oz (65.5 kg)   04/12/18 149 lb 7 oz (67.8 kg)   01/30/18 147 lb 8 oz (66.9 kg)     Body mass index is 25.57 kg/(m^2).    PHYSICAL EXAM:  /58 (Patient Site: Left Arm, Patient Position: Sitting, Cuff Size: Adult Regular)  Pulse 74  Wt 144 lb 6 oz (65.5 kg)  BMI 25.57 kg/m2  General appearance: alert, appears stated age and cooperative  Lungs: clear to auscultation bilaterally  Heart: regular rate and rhythm, S1, S2 normal, no murmur, click, rub or gallop  Neurologic: Grossly normal      QUALITY MEASURES:  The following high BMI interventions were performed this visit: encouragement to exercise    ADDITIONAL HISTORY SUMMARIZED (2): Reviewed colonoscopy 2016. Colon polyps follow up 2 years.   DECISION TO OBTAIN EXTRA INFORMATION (1): Care Everywhere.   RADIOLOGY TESTS (1): None.  LABS (1):  Ordered TSH and Vitamin D.   MEDICINE TESTS (1): Reviewed ECHO 08/2016.   INDEPENDENT REVIEW (2 each): None.     The visit lasted a total of 25 minutes face to face with the patient. Over 50% of the time was spent counseling and educating the patient about over weight and colo polyps.     I, Casie Henry, am scribing for and in the presence of, Dr. Dahlia Walton.    I, Dr. Dahlia Walton MD, personally performed the services described in this documentation, as scribed by Casie Henry in my presence, and it is both accurate and complete.    MEDICATIONS:  Current Outpatient Prescriptions   Medication Sig Dispense Refill     doxylamine (UNISON) 25 mg tablet Take 25 mg by mouth at bedtime as needed for sleep.       ergocalciferol (ERGOCALCIFEROL) 50,000 unit capsule Take 1 capsule (50,000 Units total) by mouth 2 (two) times a week. 24 capsule 0     ibuprofen (ADVIL,MOTRIN) 800 MG tablet Give 800mg now       levothyroxine (SYNTHROID) 25 MCG tablet 25mcg alternating with 50mcg daily. FREDO synthroid 135 tablet 0     melatonin 3 mg Tab tablet Take 6 mg by mouth bedtime as needed.       YAO, 28, 3-0.02 mg per tablet TAKE 1 TABLET BY MOUTH OF ACTIVE PILLS DAILY FOR 4 MONTHS THEN 1 PILL-FREE WEEK FOR PERIOD THEN REPEAT. FREDO 4 Package 4     lorcaserin (BELVIQ) 10 mg Tab Take 10 mg by mouth 2 (two) times a day. 60 tablet 3     No current facility-administered medications for this visit.        Total data points: 5

## 2021-06-20 NOTE — LETTER
Letter by Wes Salas MD at      Author: Wes Salas MD Service: -- Author Type: --    Filed:  Encounter Date: 8/13/2020 Status: (Other)       Dear Shyann Jurado    Thank you for choosing [facility] for your care.  We are committed to providing you with the highest quality and compassionate healthcare services.  The following information pertains to your first appointment with our clinic.    Date/Time of appointment: 9/4/2020 10:45am    Note: Please arrive 30 minutes prior to your appointment time.  This allows time to complete forms, possible labs and nursing assessment.     Name of your Physician: Wes Salas MD    What to bring to your appointment:    Completed Patient History/Initial Nursing Assessment and Medication/Allergy List (these forms were sent to you).    Any paperwork or films from your physician that we have asked you to bring.    Your current insurance card(s).    Parking:    Please refer to the map included to direct you.  The [facility] is located at the [address] end of [facility] in [place], [place].      After turning onto [address] from [facility], take a right turn at the first stop sign.  We have designated parking on the left, identified as parking for Cancer Care patients (Lot D).     The Code to Enter Lot D is: 0901. This code changes monthly and will always coincide with the current month followed by 01. For example August will be 0801.  The month will continue to change but the 01 will remain constant.  If lot D is full please use Parking Lot A, directly across the street.    Please enter the Cancer Care Center on the north end of the [facility].  You will see a sign on the building.        For Medical Oncology or Hematology appointments, please take the elevator to the second floor to check in.   For Radiation Oncology appointments, please go straight through the double doors and check in.     Also please note appointments can last 1.5-2  hours.      We hope these instructions are helpful to you.  If you have any questions or concerns, please call us at [phone].  It is our pleasure to assist you.    Warm Regards,    [phone]

## 2021-06-21 NOTE — PROGRESS NOTES
Assessment:   1. Other specified hypothyroidism  Thyroid Cascade   2. Abnormal menses  HM2(CBC w/o Differential)    Prolactin    Thyroid Cascade    Ferritin    Iron and Transferrin Iron Binding Capacity   3. Vitamin D deficiency  Vitamin D, Total (25-Hydroxy)   4. High risk medication use     5. Chronic migraine without aura without status migrainosus, not intractable  MR Brain Without Contrast    MRA Head With Contrast   6. Family history of aneurysm  MR Brain Without Contrast    MRA Head With Contrast       Plan:   Patient Instructions   Ordered brain MRI and MRA.    Labs as ordered.     Pelvic ultrasound order if abnormal periods persist.    We would like you to have a period every 3 months.    Imitrex 50 up to every 2 hours with migraine with a max of 200 mg in 24 hours. Take first dose at home.    Neurology if needed or wishes.    Phentermine 15 mg daily, can increase to 30 mg daily if needed, 60 per month. This is month #3.    Follow-up in 3 months if wishing continue the Phentermine, can set that at a physical, come fasting.    Forty minutes spent with this patient, at least 50% in coordination of care or counseling reguarding the topics discussed in note.    Subjective:  Chief Complaint   Patient presents with     Medication Management     phentermine      Shyann Jurado, a 43 y.o. year old, comes in to clinic with complaints of:    Follow-up for phentermine.  Patient is wishing to lose about 10 pounds.  She had been down about 7 or 8 pounds but has gained some weight back.  She had been on phentermine back in April then stopped it and went on phentermine combined with Topamax but the Topamax caused hair loss.  She then decided to go on Belviq for 1 month but it did not help at all.  She has been back on phentermine now for about 6 weeks.  She has had no side effects of headache, chest pain, palpitations, shortness of breath.  Like to continue on the phentermine and possibly increase this to 30 mg  daily.    Hypothyroid: She went off her thyroid medication in September.  She is now using an FDA approved growth hormone gel.  She feels she is getting better sleep.  She would like to see what her thyroid function is today.    Vitamin D deficiency: Patient is on vitamin D supplementation and would like to have that level checked.    Migraine headache: Patient had new onset of migraine headaches over the past 2 years.  She will get a severe headache every 1-2 months.  Will last anywhere from 12-24 hours.  She does not have any atypical symptoms such as facial droop slurred speech numbness tingling or arm or leg weakness.  He is never been on a migraine medication is not seen a neurologist.  Has not had head imaging.  She is been seeing a chiropractor for about 6 months and then has had some less headaches.  She has had 2 headaches in the past 6 months.  If she goes to bed with a headache she can wake up with a headache.  She does take continuous birth control only getting her period every 5-6 months.  Migraines are not necessarily associated with her menses but can be at the end of a pill pack.  Has been on Irma birth control with heat for years.  She has a family history of brain aneurysm.    Irregular menses-patient typically only got her period every 5-6 months when she would go off the hormone pills.  For the past 5 months she has been getting her menses every month.  It seemed to have synced with her 12-year-old daughter's menstrual cycle.  This is even happen when she has been on the hormone portion of the birth control pill.  Recently she has been bleeding for the last 3 weeks.  She decided to finish the pill pack take the placebo pills for a week to reset her system.  No other concerns.            Current Outpatient Medications   Medication Sig Note     doxylamine (UNISON) 25 mg tablet Take 25 mg by mouth at bedtime as needed for sleep.      ergocalciferol (ERGOCALCIFEROL) 50,000 unit capsule TAKE 1 CAPSULE  BY MOUTH 2 TIMES A WEEK      ibuprofen (ADVIL,MOTRIN) 800 MG tablet Give 800mg now 11/3/2015: Received from: [employer]     melatonin 3 mg Tab tablet Take 6 mg by mouth bedtime as needed.      YAO, 28, 3-0.02 mg per tablet TAKE 1 TABLET BY MOUTH OF ACTIVE PILLS DAILY FOR 4 MONTHS THEN 1 PILL-FREE WEEK FOR PERIOD THEN REPEAT. FREDO      phentermine 15 MG capsule Take 1-2 capsules daily..      SUMAtriptan (IMITREX) 50 MG tablet Take 1 tablet (50 mg total) by mouth every 2 (two) hours as needed for migraine (max of 200 mg per day).        Patient Active Problem List   Diagnosis     Major Depression, Recurrent     Vitamin D Deficiency     Fatigue     Overweight     High risk medication use     Iron deficiency anemia- possible IRIDA     Hypothyroid     History of colonic polyps       Objective:  /68 (Patient Site: Left Arm, Patient Position: Sitting, Cuff Size: Adult Regular)   Pulse 71   Temp 97.1  F (36.2  C) (Oral)   Resp 16   Wt 147 lb (66.7 kg)   BMI 26.04 kg/m    General: No apparent distress

## 2021-06-22 NOTE — PROGRESS NOTES
Dear Dr. 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  Of note she does have a family history in her maternal aunt and maternal uncle of brain aneurysms.  Her mother has an incidental finding of a brain aneurysm as well.  She denies any family history of connective tissue disorder, autosomal polycystic kidney disease, or tobacco recreational drug abuse.    On physical exam patient is very pleasant and appropriate  Speech is clear fluent and appropriate  Face is symmetric throughout the forehead eyes and mouth  Extraocular muscles are intact bilaterally  Tongue and uvula elevate in the midline  Strength is full throughout the upper and lower extremities  Negative Colvin's bilaterally  Gait is nonantalgic    Assessment and plan:  Shyann Juraod is a very pleasant 43-year-old with a 2 mm outpouching of the region of the right carotid cave suggestive of an aneurysm on MRA.  I will recommend follow-up with me in 6 months with a four-vessel angiogram to evaluate this aneurysm and its location to see if it is extradural or intradural.  If this is stable and angiogram that I would recommend annual MRAs surveillance after.  I reviewed the natural history of cerebral aneurysms and the significance of familial aneurysms with Shyann as well as the risks of rupture and morbidity and risk factors of rupture including hypertension.  I did review treatment options including surveillance monitoring versus coil embolization versus clipping. We will plan on 4 vessel angiogram and follow up in 6 months.  Thank you for giving me the opportunity to see this very pleasant patient.  If you have any questions about Shyann or any other patients please feel free to contact me.  Of note I spent greater than 30 minutes counseling the patient regarding her pathology and treatment plan.    Fabiola Sanchez MD,  FAANS

## 2021-06-22 NOTE — PROGRESS NOTES
"Neurosurgery consultation was requested by: Dr. Walton for evaluation of small Right ICA aneurysm  Patient has a family history of aneurysm (mother, uncle and aunt). She ptresents with a chief complaint of one year history of migraine-type HA and \"pressure\" behind her right eye. She denies any other neurological signs or symptoms. Gait and balance are normal. Speech is fluent. Cognition is intact.  Sol Hart RN, CNRN      "

## 2021-06-24 NOTE — TELEPHONE ENCOUNTER
Refill Approved    Rx renewed per Medication Renewal Policy. Medication was last renewed on 1/30/2018    Sumit Negron, [facility] Connection Triage/Med Refill 3/14/2019     Requested Prescriptions   Pending Prescriptions Disp Refills     YAO, 28, 3-0.02 mg per tablet [Pharmacy Med Name: YAO TABLETS 28S (GREEN PKG.)] 112 tablet 0     Sig: TAKE 1 TABLET BY MOUTH OF ACTIVE PILLS DAILY FOR 4 MONTHS THEN TAKE 1 PILL-FREE WEEK FOR PERIOD THEN REPEAT    Oral Contraceptives Protocol Passed - 3/9/2019  3:36 PM       Passed - Visit with PCP or prescribing provider visit in last 12 months     Last office visit with prescriber/PCP: 4/11/2017 Yuko Kumar MD OR same dept: 11/21/2018 Dahlia Walton MD OR same specialty: 11/21/2018 Dahlia Walton MD  Last physical: 1/30/2018 Last MTM visit: Visit date not found   Next visit within 3 mo: Visit date not found  Next physical within 3 mo: Visit date not found  Prescriber OR PCP: Yuko Kumar MD  Last diagnosis associated with med order: 1. Contraception  - YAO, 28, 3-0.02 mg per tablet [Pharmacy Med Name: YAO TABLETS 28S (GREEN PKG.)]; TAKE 1 TABLET BY MOUTH OF ACTIVE PILLS DAILY FOR 4 MONTHS THEN TAKE 1 PILL-FREE WEEK FOR PERIOD THEN REPEAT  Dispense: 112 tablet; Refill: 0    If protocol passes may refill for 12 months if within 3 months of last provider visit (or a total of 15 months).

## 2021-06-25 NOTE — TELEPHONE ENCOUNTER
Fax received from [facility], they have started the Prior Authorization Process via Cover My Meds    CoverMyMeds Key: [id number]    Medication Name: Irma 3-0.02mg tablet    Insurance Plan: [employer]  PBM:   Patient ID: [id number]    Please complete the PA process

## 2021-06-25 NOTE — TELEPHONE ENCOUNTER
Central PA team  [phone]  Pool: HE PA MED ([id number])          PA has been initiated.       PA form completed and faxed insurance via Cover My Meds     Key:  [id number]     Medication:  YAO 3-0.02MG    Insurance:  [employer]        Response will be received via fax and may take up to 5-10 business days depending on plan

## 2021-07-03 NOTE — ADDENDUM NOTE
Addendum Note by Katherine Hart RN at 1/8/2019  3:00 PM     Author: Katherine Hart RN Service: -- Author Type: Registered Nurse    Filed: 5/13/2019  1:24 PM Encounter Date: 1/8/2019 Status: Signed    : Katherine Hart RN (Registered Nurse)    Addended by: KATHERINE HART on: 5/13/2019 01:24 PM        Modules accepted: Orders

## 2021-07-03 NOTE — ADDENDUM NOTE
Addendum Note by Dahlia Walton MD at 4/3/2019  8:57 AM     Author: Dahlia Walton MD Service: -- Author Type: Physician    Filed: 4/3/2019  8:57 AM Encounter Date: 3/27/2019 Status: Signed    : Dahlia Walton MD (Physician)    Addended by: DAHLIA WALTON on: 4/3/2019 08:57 AM        Modules accepted: Orders

## 2021-07-03 NOTE — ADDENDUM NOTE
Addendum Note by Yuko Kumar MD at 6/18/2017  9:39 PM     Author: Yuko Kumar MD Service: -- Author Type: Physician    Filed: 6/18/2017  9:39 PM Encounter Date: 6/13/2017 Status: Signed    : Yuko Kumar MD (Physician)    Addended by: YUKO KUMAR on: 6/18/2017 09:39 PM        Modules accepted: Orders

## 2021-08-26 ENCOUNTER — OFFICE VISIT (OUTPATIENT)
Dept: NEUROLOGY | Facility: CLINIC | Age: 46
End: 2021-08-26
Payer: COMMERCIAL

## 2021-08-26 VITALS
HEIGHT: 63 IN | DIASTOLIC BLOOD PRESSURE: 66 MMHG | BODY MASS INDEX: 25.69 KG/M2 | HEART RATE: 78 BPM | SYSTOLIC BLOOD PRESSURE: 105 MMHG | WEIGHT: 145 LBS

## 2021-08-26 PROCEDURE — 64615 CHEMODENERV MUSC MIGRAINE: CPT | Performed by: PSYCHIATRY & NEUROLOGY

## 2021-08-26 RX ORDER — TRETINOIN 1 MG/G
CREAM TOPICAL
COMMUNITY
Start: 2021-05-26

## 2021-08-26 ASSESSMENT — MIFFLIN-ST. JEOR: SCORE: 1256.85

## 2021-08-26 NOTE — LETTER
8/26/2021         RE: Shyann Jurado  [address] [place] [id number]        Dear Colleague,    Thank you for referring your patient, Shyann Jurado, to the [facility] [place]. Please see a copy of my visit note below.    See procedure note.       Again, thank you for allowing me to participate in the care of your patient.        Sincerely,        King Taylor MD

## 2021-08-26 NOTE — PROCEDURES
NEUROLOGY PROCEDURE NOTE  Aug 26, 2021      Chronic migraine Botox injection    CONSENT: The procedure was explained to the patient. The risks and benefits of the procedure and the alternatives were discussed with the patient. The patient was given an opportunity to ask any questions she had about the procedure. A verbal consent was obtained from the patient. A written consent is available in the chart.    PRE-PROCEDURE  Diagnosis: Chronic migraine  Headache frequency before the use of Botox:- 30 headache days per month  Headache frequency with Botox use:-0-1 headache days per month    EXAM  GENERAL: Healthy appearing, alert, no acute distress, normal habitus.  NEUROLOGICAL:  Patient is alert with fluent language.  Extraocular movements are intact.  Facial movements are present and symmetric.  No arm drift.    PROCEDURE SUMMARY:   The following muscles were identified after palpating for landmarks and the overlying skin was cleansed with alcohol. These muscles were then injected using a 30 guage- half  inch needle with the following doses of onabotulinumtoxin A. A 5 unit/ 0.1 ml dilution was used for the injections. A 2 x 100 unit vial was used.    1. Corrugators 5 units each side (not done).  2. Procerus 5 units (not done).  3. Frontalis 10 units each side (not done).  4. Temporalis 20 units each side.  5. Occipitalis 15 units each side.  6. Paraspinals 15 units each side.  7. Trapezius 25 units each side.    Patient has had a blepharoplasty and will hold off injecting in the forehead.    Units Injected: 150 Units  Units Discarded: 50 Units  Lot Number and Expiration: [id number]; 11/2023    The patient tolerated the procedure without any immediate complaints and will call the Neurology clinic if she has any problems or side effects from the medication. The patient will follow up in the Neurology clinic as previously decided.      King Taylor MD  Neurologist  [facility]-  [place]  [phone]

## 2021-09-23 ENCOUNTER — OFFICE VISIT (OUTPATIENT)
Dept: FAMILY MEDICINE | Facility: CLINIC | Age: 46
End: 2021-09-23
Payer: COMMERCIAL

## 2021-09-23 VITALS
BODY MASS INDEX: 24.84 KG/M2 | SYSTOLIC BLOOD PRESSURE: 115 MMHG | HEART RATE: 76 BPM | WEIGHT: 140.25 LBS | RESPIRATION RATE: 16 BRPM | TEMPERATURE: 98.6 F | DIASTOLIC BLOOD PRESSURE: 78 MMHG

## 2021-09-23 DIAGNOSIS — Z23 NEED FOR PROPHYLACTIC VACCINATION AND INOCULATION AGAINST INFLUENZA: Primary | ICD-10-CM

## 2021-09-23 DIAGNOSIS — N93.8 DYSFUNCTIONAL UTERINE BLEEDING: ICD-10-CM

## 2021-09-23 DIAGNOSIS — F41.9 ANXIETY: ICD-10-CM

## 2021-09-23 DIAGNOSIS — B35.1 ONYCHOMYCOSIS: ICD-10-CM

## 2021-09-23 LAB — PROLACTIN SERPL-MCNC: 8.7 NG/ML (ref 0–20)

## 2021-09-23 PROCEDURE — 99213 OFFICE O/P EST LOW 20 MIN: CPT | Mod: 25 | Performed by: FAMILY MEDICINE

## 2021-09-23 PROCEDURE — 36415 COLL VENOUS BLD VENIPUNCTURE: CPT | Performed by: FAMILY MEDICINE

## 2021-09-23 PROCEDURE — 90471 IMMUNIZATION ADMIN: CPT | Performed by: FAMILY MEDICINE

## 2021-09-23 PROCEDURE — 90686 IIV4 VACC NO PRSV 0.5 ML IM: CPT | Performed by: FAMILY MEDICINE

## 2021-09-23 PROCEDURE — 84146 ASSAY OF PROLACTIN: CPT | Performed by: FAMILY MEDICINE

## 2021-09-23 RX ORDER — FLUCONAZOLE 200 MG/1
200 TABLET ORAL WEEKLY
Qty: 12 TABLET | Refills: 0 | Status: SHIPPED | OUTPATIENT
Start: 2021-09-23 | End: 2021-12-10

## 2021-09-23 RX ORDER — ESCITALOPRAM OXALATE 10 MG/1
10 TABLET ORAL DAILY
Qty: 90 TABLET | Refills: 3 | Status: SHIPPED | OUTPATIENT
Start: 2021-09-23 | End: 2022-09-07

## 2021-09-23 ASSESSMENT — ANXIETY QUESTIONNAIRES
4. TROUBLE RELAXING: NEARLY EVERY DAY
3. WORRYING TOO MUCH ABOUT DIFFERENT THINGS: NEARLY EVERY DAY
1. FEELING NERVOUS, ANXIOUS, OR ON EDGE: MORE THAN HALF THE DAYS
GAD7 TOTAL SCORE: 13
7. FEELING AFRAID AS IF SOMETHING AWFUL MIGHT HAPPEN: SEVERAL DAYS
5. BEING SO RESTLESS THAT IT IS HARD TO SIT STILL: NOT AT ALL
2. NOT BEING ABLE TO STOP OR CONTROL WORRYING: NEARLY EVERY DAY
6. BECOMING EASILY ANNOYED OR IRRITABLE: SEVERAL DAYS

## 2021-09-23 NOTE — PROGRESS NOTES
"    Assessment & Plan       ICD-10-CM    1. Need for prophylactic vaccination and inoculation against influenza  Z23    2. Anxiety  F41.9 escitalopram (LEXAPRO) 10 MG tablet   3. Onychomycosis  B35.1 fluconazole (DIFLUCAN) 200 MG tablet   4. Dysfunctional uterine bleeding  N93.8 Prolactin     US Pelvic Complete with Transvaginal     Prolactin   '  Will start Lexapro 10 mg, break 3 tabs in half to take a half tab a day for 6 days then up to a full tab a day.    Physical due in May.    Checking prolactin.    Ordering a pelvic ultrasound.    Recommend going back to birth control 3 weeks of hormone pill 1 week off to try to regulate your body.    Then the next step would be a gynecology consult to see if you are a candidate for Provera to cause a withdrawal bleed and discuss a possible ablation.     For the toenail fungus and starting fluconazole 200 mg weekly for 12 weeks.    Flu shot today.        20 minutes spent on the date of the encounter doing chart review, history and exam, documentation and further activities per the note  20     BMI:   Estimated body mass index is 24.84 kg/m  as calculated from the following:    Height as of 8/26/21: 1.6 m (5' 3\").    Weight as of this encounter: 63.6 kg (140 lb 4 oz).         No follow-ups on file.    Dahlia Walton MD  [facility]   Shyann is a 46 year old who presents for the following health issues     HPI   Toenail changes:  Feel there is a fungal infection in right big toe.  Noticed yellow and thickening of right first toenail for a few months.    Bleeding:  Full period mid cycle and clots.  This happened for 3 cycles.  Spoting midcycle for the 3 cycles before that in last 6 months. Changed to Sarah couple years ago with OBGYN because of spotting and helped for a while.   Takes continuous hormone by taking 12 week of hormone and 1 week off for last 1 1/2 years.  Using for ovarian cysts, migraine and birth control.  Has not " missed any pills.  Last 3 months, did not take the week off of hormone.  Last time took week off was 2 1/2 to 3 months ago.  Tried Mirena IUD and had horrible cystic acne and cramping and had it removed.  Not getting migraines now with Neurtec.    Anxiety:  Children having health issues, 1 from issues after Covid.  Had been on Lexapro in the past and it helped.  Would like to restart Lexapro.            Review of Systems       Objective    /78 (BP Location: Left arm, Patient Position: Sitting, Cuff Size: Adult Regular)   Pulse 76   Temp 98.6  F (37  C) (Oral)   Resp 16   Wt 63.6 kg (140 lb 4 oz)   BMI 24.84 kg/m    Body mass index is 24.84 kg/m .  Physical Exam   Toenails:  Yellow thickened right first toenail for a few months.

## 2021-09-23 NOTE — PATIENT INSTRUCTIONS
Will start Lexapro 10 mg, break 3 tabs in half to take a half tab a day for 6 days then up to a full tab a day.    Physical due in May.    Today I am getting a prolactin.    Ordering a pelvic ultrasound.    Recommend going back to birth control 3 weeks of hormone pill 1 week off to try to regulate your body.    Then the next step would be a gynecology consult to see if you are a candidate for Provera to cause a withdrawal bleed and discuss a possible ablation.     For the toenail fungus and starting fluconazole 200 mg weekly for 12 weeks.    Flu shot today.

## 2021-10-05 ENCOUNTER — HOSPITAL ENCOUNTER (OUTPATIENT)
Dept: ULTRASOUND IMAGING | Facility: HOSPITAL | Age: 46
Discharge: HOME | End: 2021-10-05
Attending: FAMILY MEDICINE | Admitting: FAMILY MEDICINE
Payer: COMMERCIAL

## 2021-10-05 DIAGNOSIS — N93.8 DYSFUNCTIONAL UTERINE BLEEDING: ICD-10-CM

## 2021-10-05 PROCEDURE — 76830 TRANSVAGINAL US NON-OB: CPT

## 2021-11-24 DIAGNOSIS — Z30.011 INITIATION OF OCP (BCP): Primary | ICD-10-CM

## 2021-11-24 RX ORDER — DROSPIRENONE AND ETHINYL ESTRADIOL 0.03MG-3MG
1 KIT ORAL DAILY
Qty: 84 TABLET | Refills: 3 | Status: SHIPPED | OUTPATIENT
Start: 2021-11-24 | End: 2022-09-07

## 2021-12-09 ENCOUNTER — IMMUNIZATION (OUTPATIENT)
Dept: NURSING | Facility: CLINIC | Age: 46
End: 2021-12-09
Payer: COMMERCIAL

## 2021-12-09 PROCEDURE — 91300 PR COVID VAC PFIZER DIL RECON 30 MCG/0.3 ML IM: CPT

## 2021-12-09 PROCEDURE — 0004A PR COVID VAC PFIZER DIL RECON 30 MCG/0.3 ML IM: CPT

## 2021-12-11 DIAGNOSIS — E55.9 VITAMIN D DEFICIENCY: Primary | ICD-10-CM

## 2021-12-13 RX ORDER — ERGOCALCIFEROL 1.25 MG/1
CAPSULE, LIQUID FILLED ORAL
Qty: 12 CAPSULE | Refills: 3 | Status: SHIPPED | OUTPATIENT
Start: 2021-12-13 | End: 2022-09-07

## 2021-12-15 DIAGNOSIS — G43.709 CHRONIC MIGRAINE WITHOUT AURA: ICD-10-CM

## 2021-12-16 RX ORDER — RIMEGEPANT SULFATE 75 MG/75MG
TABLET, ORALLY DISINTEGRATING ORAL
Qty: 15 TABLET | Refills: 1 | Status: SHIPPED | OUTPATIENT
Start: 2021-12-16 | End: 2022-07-26

## 2022-01-31 DIAGNOSIS — G43.719 INTRACTABLE CHRONIC MIGRAINE WITHOUT AURA AND WITHOUT STATUS MIGRAINOSUS: ICD-10-CM

## 2022-01-31 RX ORDER — SUMATRIPTAN 50 MG/1
TABLET, FILM COATED ORAL
Qty: 18 TABLET | Refills: 4 | Status: SHIPPED | OUTPATIENT
Start: 2022-01-31 | End: 2022-03-29

## 2022-01-31 NOTE — TELEPHONE ENCOUNTER
Refill request for Imitrex. Pt last seen 8/26/21 and has follow up scheduled for 3/2/22. Will send in refill.     Jada Miranda RN on 1/31/2022 at 10:09 AM

## 2022-02-08 ENCOUNTER — ANCILLARY PROCEDURE (OUTPATIENT)
Dept: MAMMOGRAPHY | Facility: CLINIC | Age: 47
End: 2022-02-08
Attending: FAMILY MEDICINE
Payer: COMMERCIAL

## 2022-02-08 DIAGNOSIS — Z12.31 VISIT FOR SCREENING MAMMOGRAM: ICD-10-CM

## 2022-02-08 PROCEDURE — 77063 BREAST TOMOSYNTHESIS BI: CPT | Mod: TC | Performed by: RADIOLOGY

## 2022-02-08 PROCEDURE — 77067 SCR MAMMO BI INCL CAD: CPT | Mod: TC | Performed by: RADIOLOGY

## 2022-02-17 PROBLEM — G43.709 CHRONIC MIGRAINE WITHOUT AURA: Status: ACTIVE | Noted: 2020-10-02

## 2022-03-02 ENCOUNTER — MYC MEDICAL ADVICE (OUTPATIENT)
Dept: NEUROLOGY | Facility: CLINIC | Age: 47
End: 2022-03-02

## 2022-03-08 NOTE — TELEPHONE ENCOUNTER
[facility] Call Center    Phone Message    May a detailed message be left on voicemail: yes     Reason for Call: Other: Patient returning Karly's call. Please call patient back.     Action Taken: Message routed to:  [facility] ([facility] neurology    Travel Screening: Not Applicable

## 2022-03-29 ENCOUNTER — TELEPHONE (OUTPATIENT)
Dept: NEUROLOGY | Facility: CLINIC | Age: 47
End: 2022-03-29

## 2022-03-29 ENCOUNTER — OFFICE VISIT (OUTPATIENT)
Dept: NEUROLOGY | Facility: CLINIC | Age: 47
End: 2022-03-29
Payer: COMMERCIAL

## 2022-03-29 VITALS
SYSTOLIC BLOOD PRESSURE: 93 MMHG | HEIGHT: 63 IN | WEIGHT: 143 LBS | BODY MASS INDEX: 25.34 KG/M2 | DIASTOLIC BLOOD PRESSURE: 62 MMHG | HEART RATE: 83 BPM

## 2022-03-29 DIAGNOSIS — G47.00 INSOMNIA, UNSPECIFIED TYPE: ICD-10-CM

## 2022-03-29 DIAGNOSIS — G43.719 INTRACTABLE CHRONIC MIGRAINE WITHOUT AURA AND WITHOUT STATUS MIGRAINOSUS: Primary | ICD-10-CM

## 2022-03-29 DIAGNOSIS — M54.2 NECK PAIN: ICD-10-CM

## 2022-03-29 DIAGNOSIS — I67.1 CEREBRAL ANEURYSM, NONRUPTURED: ICD-10-CM

## 2022-03-29 PROCEDURE — 99214 OFFICE O/P EST MOD 30 MIN: CPT | Performed by: PSYCHIATRY & NEUROLOGY

## 2022-03-29 RX ORDER — CYCLOBENZAPRINE HCL 5 MG
5 TABLET ORAL 3 TIMES DAILY PRN
Qty: 90 TABLET | Refills: 1 | Status: SHIPPED | OUTPATIENT
Start: 2022-03-29 | End: 2022-07-26

## 2022-03-29 RX ORDER — SUMATRIPTAN 50 MG/1
TABLET, FILM COATED ORAL
Qty: 18 TABLET | Refills: 4 | Status: SHIPPED | OUTPATIENT
Start: 2022-03-29 | End: 2022-07-26

## 2022-03-29 RX ORDER — RIMEGEPANT SULFATE 75 MG/75MG
1 TABLET, ORALLY DISINTEGRATING ORAL PRN
Qty: 12 TABLET | Refills: 11 | Status: SHIPPED | OUTPATIENT
Start: 2022-03-29 | End: 2023-04-04

## 2022-03-29 NOTE — NURSING NOTE
Chief Complaint   Patient presents with     RECHECK     migraine     Shalini Tillman MA on 3/29/2022 at 11:38 AM

## 2022-03-29 NOTE — LETTER
3/29/2022         RE: Shyann Jurado  [address] [facility] [address]        Dear Colleague,    Thank you for referring your patient, Shyann Jurado, to the [facility] [place]. Please see a copy of my visit note below.    NEUROLOGY OUTPATIENT PROGRESS NOTE   Mar 29, 2022     CHIEF COMPLAINT/REASON FOR VISIT/REASON FOR CONSULT  Patient presents with:  RECHECK: migraine    REASON FOR CONSULTATION- Headaches    HISTORY OF PRESENT ILLNESS  Shyann Jurado is a 46 year old female seen today for headaches. She reports that her headache started about 2-1/2 years ago. They have always been sporadic once or twice a month. No clear triggers for the headaches have been identified. To be related to birth control and she was switched from Irma to Sarah. This did not significantly help the headaches. There is some neck tension involved which could be leading to the headaches. Headaches generally are behind the right eye with some sharp pain photophobia and phonophobia. She does have a history of a cerebral aneurysm identified on the MRI done for headaches. Headaches are thought to be unrelated to the aneurysm. She is currently followed by Dr. Álvarez who plans to do yearly MRI brain to evaluate for aneurysms. Imitrex 50 mg has been beneficial in the past. She is also been using Flexeril intermittently. There is some history of iron deficiency. She has difficulty sleeping and uses melatonin and Unisom. Tizanidine did not provide any benefit and patient was switched to Flexeril. She has had some hair loss side effects with the Topamax. Propanolol tried last time could not be tolerated because of side effects and she stopped the medication. Imitrex injections have also been prescribed for abortive therapy though she mainly uses the oral tablets.    Patient returns today reports that she is using 1 tablet of Flexeril every night. Headaches have significantly improved and she only has one headache  in the last 6 weeks since I saw her. She used a combination of propanolol Flexeril Imitrex and caffeine as abortive therapy which worked well for her. Has gained some weight on the propanolol. Currently is off the propanolol on every day basis. Reports that her headache was at the time of the menstrual cycle. She is planning on seeing her OB/GYN may be change of birth control. Complains of some spotting. She is sleeping better with the Flexeril.    10/4/20  Patient returns today.  She reports that in the summer months she did not have any headaches no recently headaches have come back.  Headaches unchanged in nature.  The headache is still behind the right eye.  She wonders if the headaches might be related to her cycle being a bit off though is not completely sure what caused it.  There was some stress involved because her daughter tested positive for COVID though that should have made the headaches worse during summertime.  She did have one headache in September this started with right-sided neck tension and was slightly more severe than her baseline headaches.  She is using propanolol Flexeril Imitrex and caffeine for abortive therapy.  This combination seems to work for her.  Is not taking any preventive medication at this point.  Is using Flexeril to help with sleep.  Has rarely (about 2 times) needed to use the Imitrex injection.    She did receive a diagnostic angiogram and her aneurysm is stable.  This plans to do another angiogram in 1 year.    11/23/20  Patient returns today.  Reports that for the last 2 weeks she has been having a continuous headache.  She was prescribed nortriptyline when she had called the clinic which she has not tried so far.  She does not want to be on medications.  Is interested in getting the Botox done today.  She also gets cosmetic Botox for her forehead.  She reports this was a month ago when she got 10 units.  She thinks her some of her headaches might be hormonal in nature.   She continues to use Le[place]ell and Imitrex for abortive therapy.  We talked about her being premenopause.  She is going to stop her birth control.  She is thinking about hysterectomy.  Her mother needed to get hysterectomy done.  We talked about that the migraines might get worse stopping the birth control or after menopause.  She does not need to get the hysterectomy just because of the headaches.  There is no clear evidence that the hysterectomy will help the headaches.  Headaches otherwise unchanged nature.  She has a few bad headaches since she was last seen.    She is sleeping well at this point with the Flexeril at nighttime.  No aneurysm symptoms.    2/24/21  Patient returns today.  Reports that she is having 2 headache days a month with the Botox.  Denies any side effects.  Feels that her neck is less tight with the Botox.  She has stopped the propanolol.  She is no longer using the Imitrex oral tablets.  She still needs to use injections and has to use that twice.  Nurtec has been working well for abortive therapy.  Reports no aneurysm-like symptoms  Scheduled for bilateral blepharoplasty surgery next week.  Does not want us injecting in the forehead.  Is using her cyclobenzaprine at night and on as-needed basis.  Has talked to her OB/GYN and feels that hysterectomy will make things worse and that is on hold for right now.    4/20/21  Patient returns today.  She reports one bad migraine and 3 minor headaches in the last 2 months.  Overall Botox has been working really well.  Reports no side effects.  She has had a blepharoplasty surgery reports some scalp numbness behind her hairline.  Nurtec works really well for abortive therapy.  Imitrex injections occasionally she will have to use for more severe headaches.  Reports no aneurysm symptoms.  Has MRI scheduled for May.  Flexeril has been used as needed.  She stopped the propanolol.  Denies any other hormonal issues.    3/29/22  Patient returns today.  She was  seen last year.  Has not done the Botox since then.  Headaches have gotten worse again.  She is having 9 days a month.  Previously used to be behind the right temple/eye and those headaches have not come back.  Exam more in the back of the head radiating up the neck.  There is associated photophobia and phonophobia.  She does use Flexeril as needed.  Is using Imitrex injections and tablets though that does make her sleepy.  Nurtec was working previously but is no longer that helpful.  She is interested in trying Emgality.  Is off the propanolol at this point.  Remains on the Irma birth control.  Does not think this is causing her headaches    Previous history is reviewed and this is unchanged.    PAST MEDICAL/SURGICAL HISTORY  Past Medical History:   Diagnosis Date     Anemia      Aneurysm (H)      Brain aneurysm      Brain aneurysm      Carotid artery aneurysm (H)      Chronic headaches      Colon polyps      Fatigue      Fatigue      Hx of colonic polyps      Hypothyroid      Hypothyroid      Iron deficiency anemia      Ptosis of eyelid, bilateral      Vitamin D deficiency      Vitamin D deficiency      Patient Active Problem List   Diagnosis     Vitamin D deficiency     Iron deficiency anemia     Insomnia     Hypothyroidism     History of colonic polyps     Fatigue     Chronic migraine     Brain aneurysm   Significant for high cholesterol, migraine headaches, depression, iron deficiency      FAMILY HISTORY  Family History   Problem Relation Age of Onset     Migraines Mother      Gallbladder Disease Maternal Aunt 75.00     Colon Cancer Paternal Aunt 50.00     Kidney Cancer Maternal Grandmother 70.00     Colon Cancer Maternal Grandfather 84.00     Prostate Cancer Paternal Grandfather 80.00     Kidney Cancer Paternal Grandfather      Colon Cancer Paternal Grandfather 78.00     Melanoma Paternal Aunt      Aneurysm Paternal Aunt         Brain     Melanoma Paternal Aunt 48.00     Aneurysm Mother      Melanoma Father   "    Other - See Comments Daughter         ELIAS     Hashimoto's thyroiditis Daughter      Other - See Comments Son         PANDAS     Aneurysm Maternal Uncle         Brain   Reviewed and negative for neurological conditions    SOCIAL HISTORY  Social History     Tobacco Use     Smoking status: Never Smoker     Smokeless tobacco: Never Used   Substance Use Topics     Alcohol use: Yes     Comment: rare     Drug use: Never       SYSTEMS REVIEW  Twelve-system ROS was done and other than the HPI this was negative.   No new concerns/issues today.    MEDICATIONS  acetaminophen (TYLENOL) 500 MG tablet, Take 500 mg by mouth every 6 hours as needed for mild pain  doxylamine (UNISOM) 25 MG TABS tablet, Take 25 mg by mouth nightly as needed  drospirenone-ethinyl estradiol (ADRIÁN) 3-0.03 MG tablet, Take 1 tablet by mouth daily  ibuprofen (ADVIL/MOTRIN) 800 MG tablet, Take 800 mg by mouth every 8 hours as needed for moderate pain  melatonin 3 MG tablet, Take 1 mg by mouth nightly as needed for sleep  NURTEC 75 MG TBDP, TAKE 1 TABLET BY MOUTH AS NEEDED FOR HEADACHES.  SUMAtriptan (IMITREX STATDOSE) 6 MG/0.5ML pen injector kit,   SUMAtriptan (IMITREX) 6 MG/0.5ML injection, Inject 0.5 mLs (6 mg) Subcutaneous as needed for migraine (Headache)  tretinoin (RETIN-A) 0.1 % external cream, APPLY PEA SIZED AMOUNT TOPICALLY TO THE AFFECTED AREA EVERY DAY AT BEDTIME AS DIRECTED  vitamin D2 (ERGOCALCIFEROL) 45158 units (1250 mcg) capsule, TAKE 1 CAPSULE BY MOUTH WEEKLY  escitalopram (LEXAPRO) 10 MG tablet, Take 1 tablet (10 mg) by mouth daily    Botulinum Toxin Type A (BOTOX) 200 units injection 200 Units           PHYSICAL EXAMINATION  VITALS: BP 93/62 (BP Location: Left arm, Patient Position: Sitting)   Pulse 83   Ht 1.6 m (5' 3\")   Wt 64.9 kg (143 lb)   BMI 25.33 kg/m    GENERAL: Healthy appearing, alert, no acute distress, normal habitus.  CARDIOVASCULAR: Ext warm and well perfused. Pulses present. NEUROLOGICAL: Patient is awake and " oriented to self, place and time. Attention span is normal. Memory grossly intact. Language is fluent and follows commands appropriately. Appropriate fund of knowledge. Cranial nerves 2-12 are intact. There is no pronator drift. Motor exam shows 5/5 strength in all extremities. Tone is symmetric bilaterally in upper and lower extremities. Finger to nose is without dysmetria. Gait is normal and the patient is able to do tandem walk.    DIAGNOSTICS  DSA  Conically-shaped right internal carotid artery superior hypophyseal   aneurysm measures 1.3 mm deep by 1.7 mm x 2.1 mm across. The aneurysm neck   measures up to 2.1 mm in maximum dimension. There is very mild fusiform   ectasia of the paraclinoid segment   giving rise to this more focal saccular aneurysm.    DSA 2020  1. 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    2. No evidence of new intracranial aneurysm.    Results were discussed with the patient and conveyed to the neurosurgical team immediately following the procedure.    Evaluation and stenosis determination based on NASCET criteria.    MRI  HEAD MRI:  1.  Negative brain MRI. No acute intracranial finding. No evidence for recent  ischemia, intracranial hemorrhage, or mass.     HEAD MRA:  1.  Approximately 2 mm outpouching in the region of the right carotid cave may  reflect a small aneurysm. This is near the dural rings and could be intradural  in location.  2.  Otherwise negative brain MRA.        RELEVANT LABS  Ferritin 67    MRA 2021  HEAD MRI:   1.  Normal head MRI.     HEAD MRA:   1.  Accounting for differences in modality, no significant change in the size or morphology of a 1.3 x 1.7 x 2.1 mm right internal carotid artery superior hypophyseal aneurysm.   2.  No evidence of new intracranial aneurysm, high-flow vascular malformation or high-grade stenosis.     NECK MRA:  1.  Normal  neck MRA.      IMPRESSION/REPORT/PLAN  Insomnia, unspecified type  Chronic migraine  Cerebral aneurysm, nonruptured-stable  History of blepharoplasty surgery  Neck tightness/neck pain  Question hormonal migraines    This is a 46 year old female chronic migraines, unrelated cerebral aneurysm, insomnia.  Headaches have improved with the Botox that this is no longer approved by the insurance.  There might be a hormonal component as previously discussed with the patient.  She is using Nurtec and has stopped using Imitrex, Flexeril, propanolol, caffeine combination.  Nurtec is less helpful and she has used this combination for some time without the propanolol.  She still needs the Imitrex injections.  I did refill her medications for her.    With the Botox no longer being approved we will try Emgality to see if that helps with the headaches further.  She has tried Botox, tizanidine, Flexeril, propanolol in the past.    She is sleeping well at night.  Can use Flexeril at nighttime as needed for insomnia neck stiffness.    Further cerebral aneurysm she would need yearly scans though I think a MRA would be sufficient and she does not need a full diagnostic angiogram.  MRA from last year shows stable aneurysm.  Will need a repeat scan this year.  She is following up with neurosurgery regarding these aneurysms.    Her headaches might be hormonal and previously options were discussed with the patient.  She can continue birth control per direction of OB/GYN.  She can further follow-up with her OB/GYN regarding this.    I can see her back in 3 to 4 months.    -     galcanezumab-gnlm (EMGALITY) 120 MG/ML injection; Inject 1 mL (120 mg) Subcutaneous every 28 days  -     galcanezumab-gnlm (EMGALITY) 120 MG/ML injection; Inject 2 mLs (240 mg) Subcutaneous every 28 days Loading dose.  -     cyclobenzaprine (FLEXERIL) 5 MG tablet; Take 1 tablet (5 mg) by mouth 3 times daily as needed for muscle spasms  -     SUMAtriptan (IMITREX) 50  MG tablet; TAKE 1 TABLET(50 MG) BY MOUTH AT ONSET OF HEADACHE FOR MIGRAINE  -     Rimegepant Sulfate (NURTEC) 75 MG TBDP; Take 1 tablet by mouth as needed (Migraine)  -Sumatriptan injections    Return in about 4 months (around 7/29/2022) for In-Clinic Visit (must), After testing.    Over 35 minutes were spent coordinating the care for the patient on the day of the encounter.  This includes previsit, during visit and post visit activities as documented above.  Counseling patient.  MRI reviewed.  Multiple prescriptions managed.  (Activities include but not inclusive of reviewing chart, reviewing outside records, reviewing labs and imaging study results as well as the images, patient visit time including getting history and exam,  use if applicable, review of test results with the patient and coming up with a plan in a shared model, counseling patient and family, education and answering patient questions, EMR , EMR diagnosis entry and problem list management, medication reconciliation and prescription management if applicable, paperwork if applicable, printing documents and documentation of the visit activities.)      King Taylor MD  Neurologist  [facility]  Tel:- [phone]    This note was dictated using voice recognition software.  Any grammatical or context distortions are unintentional and inherent to the software.        Again, thank you for allowing me to participate in the care of your patient.        Sincerely,        King Taylor MD

## 2022-03-29 NOTE — PROGRESS NOTES
NEUROLOGY OUTPATIENT PROGRESS NOTE   Mar 29, 2022     CHIEF COMPLAINT/REASON FOR VISIT/REASON FOR CONSULT  Patient presents with:  RECHECK: migraine    REASON FOR CONSULTATION- Headaches    HISTORY OF PRESENT ILLNESS  Shyann Jurado is a 46 year old female seen today for headaches. She reports that her headache started about 2-1/2 years ago. They have always been sporadic once or twice a month. No clear triggers for the headaches have been identified. To be related to birth control and she was switched from Irma to Sarah. This did not significantly help the headaches. There is some neck tension involved which could be leading to the headaches. Headaches generally are behind the right eye with some sharp pain photophobia and phonophobia. She does have a history of a cerebral aneurysm identified on the MRI done for headaches. Headaches are thought to be unrelated to the aneurysm. She is currently followed by Dr. Álvarez who plans to do yearly MRI brain to evaluate for aneurysms. Imitrex 50 mg has been beneficial in the past. She is also been using Flexeril intermittently. There is some history of iron deficiency. She has difficulty sleeping and uses melatonin and Unisom. Tizanidine did not provide any benefit and patient was switched to Flexeril. She has had some hair loss side effects with the Topamax. Propanolol tried last time could not be tolerated because of side effects and she stopped the medication. Imitrex injections have also been prescribed for abortive therapy though she mainly uses the oral tablets.    Patient returns today reports that she is using 1 tablet of Flexeril every night. Headaches have significantly improved and she only has one headache in the last 6 weeks since I saw her. She used a combination of propanolol Flexeril Imitrex and caffeine as abortive therapy which worked well for her. Has gained some weight on the propanolol. Currently is off the propanolol on every day basis. Reports that  her headache was at the time of the menstrual cycle. She is planning on seeing her OB/GYN may be change of birth control. Complains of some spotting. She is sleeping better with the Flexeril.    10/4/20  Patient returns today.  She reports that in the summer months she did not have any headaches no recently headaches have come back.  Headaches unchanged in nature.  The headache is still behind the right eye.  She wonders if the headaches might be related to her cycle being a bit off though is not completely sure what caused it.  There was some stress involved because her daughter tested positive for COVID though that should have made the headaches worse during summertime.  She did have one headache in September this started with right-sided neck tension and was slightly more severe than her baseline headaches.  She is using propanolol Flexeril Imitrex and caffeine for abortive therapy.  This combination seems to work for her.  Is not taking any preventive medication at this point.  Is using Flexeril to help with sleep.  Has rarely (about 2 times) needed to use the Imitrex injection.    She did receive a diagnostic angiogram and her aneurysm is stable.  This plans to do another angiogram in 1 year.    11/23/20  Patient returns today.  Reports that for the last 2 weeks she has been having a continuous headache.  She was prescribed nortriptyline when she had called the clinic which she has not tried so far.  She does not want to be on medications.  Is interested in getting the Botox done today.  She also gets cosmetic Botox for her forehead.  She reports this was a month ago when she got 10 units.  She thinks her some of her headaches might be hormonal in nature.  She continues to use Le[place]ell and Imitrex for abortive therapy.  We talked about her being premenopause.  She is going to stop her birth control.  She is thinking about hysterectomy.  Her mother needed to get hysterectomy done.  We talked about that the  migraines might get worse stopping the birth control or after menopause.  She does not need to get the hysterectomy just because of the headaches.  There is no clear evidence that the hysterectomy will help the headaches.  Headaches otherwise unchanged nature.  She has a few bad headaches since she was last seen.    She is sleeping well at this point with the Flexeril at nighttime.  No aneurysm symptoms.    2/24/21  Patient returns today.  Reports that she is having 2 headache days a month with the Botox.  Denies any side effects.  Feels that her neck is less tight with the Botox.  She has stopped the propanolol.  She is no longer using the Imitrex oral tablets.  She still needs to use injections and has to use that twice.  Nurtec has been working well for abortive therapy.  Reports no aneurysm-like symptoms  Scheduled for bilateral blepharoplasty surgery next week.  Does not want us injecting in the forehead.  Is using her cyclobenzaprine at night and on as-needed basis.  Has talked to her OB/GYN and feels that hysterectomy will make things worse and that is on hold for right now.    4/20/21  Patient returns today.  She reports one bad migraine and 3 minor headaches in the last 2 months.  Overall Botox has been working really well.  Reports no side effects.  She has had a blepharoplasty surgery reports some scalp numbness behind her hairline.  Nurtec works really well for abortive therapy.  Imitrex injections occasionally she will have to use for more severe headaches.  Reports no aneurysm symptoms.  Has MRI scheduled for May.  Flexeril has been used as needed.  She stopped the propanolol.  Denies any other hormonal issues.    3/29/22  Patient returns today.  She was seen last year.  Has not done the Botox since then.  Headaches have gotten worse again.  She is having 9 days a month.  Previously used to be behind the right temple/eye and those headaches have not come back.  Exam more in the back of the head  radiating up the neck.  There is associated photophobia and phonophobia.  She does use Flexeril as needed.  Is using Imitrex injections and tablets though that does make her sleepy.  Nurtec was working previously but is no longer that helpful.  She is interested in trying Emgality.  Is off the propanolol at this point.  Remains on the Irma birth control.  Does not think this is causing her headaches    Previous history is reviewed and this is unchanged.    PAST MEDICAL/SURGICAL HISTORY  Past Medical History:   Diagnosis Date     Anemia      Aneurysm (H)      Brain aneurysm      Brain aneurysm      Carotid artery aneurysm (H)      Chronic headaches      Colon polyps      Fatigue      Fatigue      Hx of colonic polyps      Hypothyroid      Hypothyroid      Iron deficiency anemia      Ptosis of eyelid, bilateral      Vitamin D deficiency      Vitamin D deficiency      Patient Active Problem List   Diagnosis     Vitamin D deficiency     Iron deficiency anemia     Insomnia     Hypothyroidism     History of colonic polyps     Fatigue     Chronic migraine     Brain aneurysm   Significant for high cholesterol, migraine headaches, depression, iron deficiency      FAMILY HISTORY  Family History   Problem Relation Age of Onset     Migraines Mother      Gallbladder Disease Maternal Aunt 75.00     Colon Cancer Paternal Aunt 50.00     Kidney Cancer Maternal Grandmother 70.00     Colon Cancer Maternal Grandfather 84.00     Prostate Cancer Paternal Grandfather 80.00     Kidney Cancer Paternal Grandfather      Colon Cancer Paternal Grandfather 78.00     Melanoma Paternal Aunt      Aneurysm Paternal Aunt         Brain     Melanoma Paternal Aunt 48.00     Aneurysm Mother      Melanoma Father      Other - See Comments Daughter         PANDAS     Hashimoto's thyroiditis Daughter      Other - See Comments Son         PANDAS     Aneurysm Maternal Uncle         Brain   Reviewed and negative for neurological conditions    SOCIAL  "HISTORY  Social History     Tobacco Use     Smoking status: Never Smoker     Smokeless tobacco: Never Used   Substance Use Topics     Alcohol use: Yes     Comment: rare     Drug use: Never       SYSTEMS REVIEW  Twelve-system ROS was done and other than the HPI this was negative.   No new concerns/issues today.    MEDICATIONS  acetaminophen (TYLENOL) 500 MG tablet, Take 500 mg by mouth every 6 hours as needed for mild pain  doxylamine (UNISOM) 25 MG TABS tablet, Take 25 mg by mouth nightly as needed  drospirenone-ethinyl estradiol (ADRIÁN) 3-0.03 MG tablet, Take 1 tablet by mouth daily  ibuprofen (ADVIL/MOTRIN) 800 MG tablet, Take 800 mg by mouth every 8 hours as needed for moderate pain  melatonin 3 MG tablet, Take 1 mg by mouth nightly as needed for sleep  NURTEC 75 MG TBDP, TAKE 1 TABLET BY MOUTH AS NEEDED FOR HEADACHES.  SUMAtriptan (IMITREX STATDOSE) 6 MG/0.5ML pen injector kit,   SUMAtriptan (IMITREX) 6 MG/0.5ML injection, Inject 0.5 mLs (6 mg) Subcutaneous as needed for migraine (Headache)  tretinoin (RETIN-A) 0.1 % external cream, APPLY PEA SIZED AMOUNT TOPICALLY TO THE AFFECTED AREA EVERY DAY AT BEDTIME AS DIRECTED  vitamin D2 (ERGOCALCIFEROL) 97124 units (1250 mcg) capsule, TAKE 1 CAPSULE BY MOUTH WEEKLY  escitalopram (LEXAPRO) 10 MG tablet, Take 1 tablet (10 mg) by mouth daily    Botulinum Toxin Type A (BOTOX) 200 units injection 200 Units           PHYSICAL EXAMINATION  VITALS: BP 93/62 (BP Location: Left arm, Patient Position: Sitting)   Pulse 83   Ht 1.6 m (5' 3\")   Wt 64.9 kg (143 lb)   BMI 25.33 kg/m    GENERAL: Healthy appearing, alert, no acute distress, normal habitus.  CARDIOVASCULAR: Ext warm and well perfused. Pulses present. NEUROLOGICAL: Patient is awake and oriented to self, place and time. Attention span is normal. Memory grossly intact. Language is fluent and follows commands appropriately. Appropriate fund of knowledge. Cranial nerves 2-12 are intact. There is no pronator drift. Motor " exam shows 5/5 strength in all extremities. Tone is symmetric bilaterally in upper and lower extremities. Finger to nose is without dysmetria. Gait is normal and the patient is able to do tandem walk.    DIAGNOSTICS  DSA  Conically-shaped right internal carotid artery superior hypophyseal   aneurysm measures 1.3 mm deep by 1.7 mm x 2.1 mm across. The aneurysm neck   measures up to 2.1 mm in maximum dimension. There is very mild fusiform   ectasia of the paraclinoid segment   giving rise to this more focal saccular aneurysm.    DSA 2020  1. 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    2. No evidence of new intracranial aneurysm.    Results were discussed with the patient and conveyed to the neurosurgical team immediately following the procedure.    Evaluation and stenosis determination based on NASCET criteria.    MRI  HEAD MRI:  1.  Negative brain MRI. No acute intracranial finding. No evidence for recent  ischemia, intracranial hemorrhage, or mass.     HEAD MRA:  1.  Approximately 2 mm outpouching in the region of the right carotid cave may  reflect a small aneurysm. This is near the dural rings and could be intradural  in location.  2.  Otherwise negative brain MRA.        RELEVANT LABS  Ferritin 67    MRA 2021  HEAD MRI:   1.  Normal head MRI.     HEAD MRA:   1.  Accounting for differences in modality, no significant change in the size or morphology of a 1.3 x 1.7 x 2.1 mm right internal carotid artery superior hypophyseal aneurysm.   2.  No evidence of new intracranial aneurysm, high-flow vascular malformation or high-grade stenosis.     NECK MRA:  1.  Normal neck MRA.      IMPRESSION/REPORT/PLAN  Insomnia, unspecified type  Chronic migraine  Cerebral aneurysm, nonruptured-stable  History of blepharoplasty surgery  Neck tightness/neck pain  Question hormonal migraines    This is a 46 year  old female chronic migraines, unrelated cerebral aneurysm, insomnia.  Headaches have improved with the Botox that this is no longer approved by the insurance.  There might be a hormonal component as previously discussed with the patient.  She is using Nurtec and has stopped using Imitrex, Flexeril, propanolol, caffeine combination.  Nurtec is less helpful and she has used this combination for some time without the propanolol.  She still needs the Imitrex injections.  I did refill her medications for her.    With the Botox no longer being approved we will try Emgality to see if that helps with the headaches further.  She has tried Botox, tizanidine, Flexeril, propanolol in the past.    She is sleeping well at night.  Can use Flexeril at nighttime as needed for insomnia neck stiffness.    Further cerebral aneurysm she would need yearly scans though I think a MRA would be sufficient and she does not need a full diagnostic angiogram.  MRA from last year shows stable aneurysm.  Will need a repeat scan this year.  She is following up with neurosurgery regarding these aneurysms.    Her headaches might be hormonal and previously options were discussed with the patient.  She can continue birth control per direction of OB/GYN.  She can further follow-up with her OB/GYN regarding this.    I can see her back in 3 to 4 months.    -     galcanezumab-gnlm (EMGALITY) 120 MG/ML injection; Inject 1 mL (120 mg) Subcutaneous every 28 days  -     galcanezumab-gnlm (EMGALITY) 120 MG/ML injection; Inject 2 mLs (240 mg) Subcutaneous every 28 days Loading dose.  -     cyclobenzaprine (FLEXERIL) 5 MG tablet; Take 1 tablet (5 mg) by mouth 3 times daily as needed for muscle spasms  -     SUMAtriptan (IMITREX) 50 MG tablet; TAKE 1 TABLET(50 MG) BY MOUTH AT ONSET OF HEADACHE FOR MIGRAINE  -     Rimegepant Sulfate (NURTEC) 75 MG TBDP; Take 1 tablet by mouth as needed (Migraine)  -Sumatriptan injections    Return in about 4 months (around  7/29/2022) for In-Clinic Visit (must), After testing.    Over 35 minutes were spent coordinating the care for the patient on the day of the encounter.  This includes previsit, during visit and post visit activities as documented above.  Counseling patient.  MRI reviewed.  Multiple prescriptions managed.  (Activities include but not inclusive of reviewing chart, reviewing outside records, reviewing labs and imaging study results as well as the images, patient visit time including getting history and exam,  use if applicable, review of test results with the patient and coming up with a plan in a shared model, counseling patient and family, education and answering patient questions, EMR , EMR diagnosis entry and problem list management, medication reconciliation and prescription management if applicable, paperwork if applicable, printing documents and documentation of the visit activities.)      King Taylor MD  Neurologist  [facility]  Tel:- [phone]    This note was dictated using voice recognition software.  Any grammatical or context distortions are unintentional and inherent to the software.

## 2022-03-29 NOTE — TELEPHONE ENCOUNTER
Prior Authorization Specialty Medication Request    Medication/Dose: Emgality 120mg/mL  ICD code (if different than what is on RX):    Previously Tried and Failed:      Important Lab Values:   Rationale:     Insurance Name:   Insurance ID:   Insurance Phone Number:     Pharmacy Information (if different than what is on RX)  Name:    Phone:

## 2022-03-29 NOTE — TELEPHONE ENCOUNTER
Central Prior Authorization Team   Phone: [phone]      Requested medication was processed and approved through Epic's EPA            Maintenance dose can get refilled on/after 04/19/22

## 2022-04-05 ENCOUNTER — TELEPHONE (OUTPATIENT)
Dept: NEUROLOGY | Facility: CLINIC | Age: 47
End: 2022-04-05
Payer: COMMERCIAL

## 2022-04-05 NOTE — TELEPHONE ENCOUNTER
Prior Authorization Retail Medication Request    Medication/Dose: Nurtec 75mg  ICD code (if different than what is on RX):    Previously Tried and Failed:    Rationale:      Insurance Name:    Insurance ID:        Pharmacy Information (if different than what is on RX)  Name:    Phone:

## 2022-04-07 NOTE — TELEPHONE ENCOUNTER
Central Prior Authorization Team   Phone: [phone]      Prior Authorization Approval    Authorization Effective Date: 4/7/2022  Authorization Expiration Date: 4/7/2023  Medication: Nurtec 75mg--APPROVED  Approved Dose/Quantity:    Reference #:     Insurance Company: [employer] ([facility]) - Phone [phone] Fax [phone]  Expected CoPay:       CoPay Card Available:      Foundation Assistance Needed:    Which Pharmacy is filling the prescription (Not needed for infusion/clinic administered): [id number] - [place], [place] - [address] [place] [address]  AT [facility] OF [address]  Pharmacy Notified: Yes  Patient Notified: Yes PHARMACY WILL CONTACT WHEN FILLED

## 2022-04-07 NOTE — TELEPHONE ENCOUNTER
Central Prior Authorization Team   Phone: [phone]      PA Initiation    Medication: Nurtec 75mg--INITIATED  Insurance Company: [employer] ([facility]) - Phone [phone] Fax [phone]  Pharmacy Filling the Rx: [id number] - [address]  AT [facility] OF [address]   Filling Pharmacy Phone: [phone]  Filling Pharmacy Fax:    Start Date: 4/7/2022

## 2022-04-27 ENCOUNTER — TELEPHONE (OUTPATIENT)
Dept: ONCOLOGY | Facility: HOSPITAL | Age: 47
End: 2022-04-27
Payer: COMMERCIAL

## 2022-04-27 DIAGNOSIS — D50.8 OTHER IRON DEFICIENCY ANEMIA: Primary | ICD-10-CM

## 2022-04-27 NOTE — TELEPHONE ENCOUNTER
Patient calls in today and leaves message on the nurse triage line requesting to have her labs checked.  She states that it has been a while since she has been in for iron infusions and would like to see where her levels are at.  I see in her chart that Dr Salas last saw her on 9/3/20 and stated that she could call back when she becomes symptomatic or develops low iron indices the future.  Her las hemoglobin was checked on 5/19/21 with her PCP office.  I don't see that she has had a ferritin level since 3/25/21 when it actually came back high at 666.    This message is being sent over to Dr Salas to see what orders he would like entered.  As soon as orders are entered, we will get this patient in for labs with possible iron a few days later.    Divina Yu RN

## 2022-04-28 ENCOUNTER — LAB (OUTPATIENT)
Dept: INFUSION THERAPY | Facility: HOSPITAL | Age: 47
End: 2022-04-28
Attending: INTERNAL MEDICINE
Payer: COMMERCIAL

## 2022-04-28 DIAGNOSIS — D50.8 OTHER IRON DEFICIENCY ANEMIA: ICD-10-CM

## 2022-04-28 LAB
BASOPHILS # BLD AUTO: 0.1 10E3/UL (ref 0–0.2)
BASOPHILS NFR BLD AUTO: 1 %
EOSINOPHIL # BLD AUTO: 0.2 10E3/UL (ref 0–0.7)
EOSINOPHIL NFR BLD AUTO: 2 %
ERYTHROCYTE [DISTWIDTH] IN BLOOD BY AUTOMATED COUNT: 12.8 % (ref 10–15)
FERRITIN SERPL-MCNC: 468 NG/ML (ref 10–130)
HCT VFR BLD AUTO: 39 % (ref 35–47)
HGB BLD-MCNC: 12.1 G/DL (ref 11.7–15.7)
IMM GRANULOCYTES # BLD: 0.1 10E3/UL
IMM GRANULOCYTES NFR BLD: 1 %
LYMPHOCYTES # BLD AUTO: 2.1 10E3/UL (ref 0.8–5.3)
LYMPHOCYTES NFR BLD AUTO: 24 %
MCH RBC QN AUTO: 27.8 PG (ref 26.5–33)
MCHC RBC AUTO-ENTMCNC: 31 G/DL (ref 31.5–36.5)
MCV RBC AUTO: 90 FL (ref 78–100)
MONOCYTES # BLD AUTO: 0.6 10E3/UL (ref 0–1.3)
MONOCYTES NFR BLD AUTO: 7 %
NEUTROPHILS # BLD AUTO: 5.6 10E3/UL (ref 1.6–8.3)
NEUTROPHILS NFR BLD AUTO: 65 %
NRBC # BLD AUTO: 0 10E3/UL
NRBC BLD AUTO-RTO: 0 /100
PLATELET # BLD AUTO: 259 10E3/UL (ref 150–450)
RBC # BLD AUTO: 4.35 10E6/UL (ref 3.8–5.2)
WBC # BLD AUTO: 8.6 10E3/UL (ref 4–11)

## 2022-04-28 PROCEDURE — 85025 COMPLETE CBC W/AUTO DIFF WBC: CPT

## 2022-04-28 PROCEDURE — 82728 ASSAY OF FERRITIN: CPT

## 2022-04-28 PROCEDURE — 36415 COLL VENOUS BLD VENIPUNCTURE: CPT

## 2022-05-16 ENCOUNTER — HOSPITAL ENCOUNTER (OUTPATIENT)
Dept: MRI IMAGING | Facility: HOSPITAL | Age: 47
Discharge: HOME | End: 2022-05-16
Attending: NURSE PRACTITIONER | Admitting: NURSE PRACTITIONER
Payer: COMMERCIAL

## 2022-05-16 DIAGNOSIS — I67.1 NONRUPTURED CEREBRAL ANEURYSM: ICD-10-CM

## 2022-05-16 PROCEDURE — 255N000002 HC RX 255 OP 636: Performed by: NURSE PRACTITIONER

## 2022-05-16 PROCEDURE — 70553 MRI BRAIN STEM W/O & W/DYE: CPT

## 2022-05-16 PROCEDURE — A9585 GADOBUTROL INJECTION: HCPCS | Performed by: NURSE PRACTITIONER

## 2022-05-16 RX ORDER — GADOBUTROL 604.72 MG/ML
7 INJECTION INTRAVENOUS ONCE
Status: COMPLETED | OUTPATIENT
Start: 2022-05-16 | End: 2022-05-16

## 2022-05-16 RX ADMIN — GADOBUTROL 7 ML: 604.72 INJECTION INTRAVENOUS at 10:52

## 2022-07-03 ENCOUNTER — HEALTH MAINTENANCE LETTER (OUTPATIENT)
Age: 47
End: 2022-07-03

## 2022-07-26 ENCOUNTER — OFFICE VISIT (OUTPATIENT)
Dept: NEUROLOGY | Facility: CLINIC | Age: 47
End: 2022-07-26
Payer: COMMERCIAL

## 2022-07-26 VITALS
RESPIRATION RATE: 18 BRPM | BODY MASS INDEX: 25.15 KG/M2 | DIASTOLIC BLOOD PRESSURE: 74 MMHG | HEART RATE: 76 BPM | SYSTOLIC BLOOD PRESSURE: 105 MMHG | WEIGHT: 142 LBS

## 2022-07-26 DIAGNOSIS — G47.00 INSOMNIA, UNSPECIFIED TYPE: ICD-10-CM

## 2022-07-26 DIAGNOSIS — M54.2 NECK PAIN: ICD-10-CM

## 2022-07-26 DIAGNOSIS — I67.1 CEREBRAL ANEURYSM, NONRUPTURED: Primary | ICD-10-CM

## 2022-07-26 DIAGNOSIS — G43.719 INTRACTABLE CHRONIC MIGRAINE WITHOUT AURA AND WITHOUT STATUS MIGRAINOSUS: ICD-10-CM

## 2022-07-26 PROCEDURE — 99214 OFFICE O/P EST MOD 30 MIN: CPT | Performed by: PSYCHIATRY & NEUROLOGY

## 2022-07-26 RX ORDER — CYCLOBENZAPRINE HCL 5 MG
5 TABLET ORAL 3 TIMES DAILY PRN
Qty: 60 TABLET | Refills: 3 | Status: SHIPPED | OUTPATIENT
Start: 2022-07-26 | End: 2022-09-07

## 2022-07-26 RX ORDER — SUMATRIPTAN 100 MG/1
100 TABLET, FILM COATED ORAL
Qty: 18 TABLET | Refills: 11 | Status: SHIPPED | OUTPATIENT
Start: 2022-07-26 | End: 2023-04-04

## 2022-07-26 RX ORDER — SUMATRIPTAN 6 MG/.5ML
6 INJECTION, SOLUTION SUBCUTANEOUS PRN
Qty: 6 ML | Refills: 11 | Status: SHIPPED | OUTPATIENT
Start: 2022-07-26 | End: 2022-09-07

## 2022-07-26 NOTE — LETTER
7/26/2022         RE: Shyann Jurado  [address] [facility] [address]        Dear Colleague,    Thank you for referring your patient, Shyann Jurado, to the [facility] [place]. Please see a copy of my visit note below.    NEUROLOGY OUTPATIENT PROGRESS NOTE   Jul 26, 2022     CHIEF COMPLAINT/REASON FOR VISIT/REASON FOR CONSULT  Patient presents with:  Follow Up: Migraines     REASON FOR CONSULTATION- Headaches    HISTORY OF PRESENT ILLNESS  Shyann Jurado is a 47 year old female seen today for headaches. She reports that her headache started about 2-1/2 years ago. They have always been sporadic once or twice a month. No clear triggers for the headaches have been identified. To be related to birth control and she was switched from Irma to Sarah. This did not significantly help the headaches. There is some neck tension involved which could be leading to the headaches. Headaches generally are behind the right eye with some sharp pain photophobia and phonophobia. She does have a history of a cerebral aneurysm identified on the MRI done for headaches. Headaches are thought to be unrelated to the aneurysm. She is currently followed by Dr. Álvarez who plans to do yearly MRI brain to evaluate for aneurysms. Imitrex 50 mg has been beneficial in the past. She is also been using Flexeril intermittently. There is some history of iron deficiency. She has difficulty sleeping and uses melatonin and Unisom. Tizanidine did not provide any benefit and patient was switched to Flexeril. She has had some hair loss side effects with the Topamax. Propanolol tried last time could not be tolerated because of side effects and she stopped the medication. Imitrex injections have also been prescribed for abortive therapy though she mainly uses the oral tablets.    Patient returns today reports that she is using 1 tablet of Flexeril every night. Headaches have significantly improved and she only has one  headache in the last 6 weeks since I saw her. She used a combination of propanolol Flexeril Imitrex and caffeine as abortive therapy which worked well for her. Has gained some weight on the propanolol. Currently is off the propanolol on every day basis. Reports that her headache was at the time of the menstrual cycle. She is planning on seeing her OB/GYN may be change of birth control. Complains of some spotting. She is sleeping better with the Flexeril.    10/4/20  Patient returns today.  She reports that in the summer months she did not have any headaches no recently headaches have come back.  Headaches unchanged in nature.  The headache is still behind the right eye.  She wonders if the headaches might be related to her cycle being a bit off though is not completely sure what caused it.  There was some stress involved because her daughter tested positive for COVID though that should have made the headaches worse during summertime.  She did have one headache in September this started with right-sided neck tension and was slightly more severe than her baseline headaches.  She is using propanolol Flexeril Imitrex and caffeine for abortive therapy.  This combination seems to work for her.  Is not taking any preventive medication at this point.  Is using Flexeril to help with sleep.  Has rarely (about 2 times) needed to use the Imitrex injection.    She did receive a diagnostic angiogram and her aneurysm is stable.  This plans to do another angiogram in 1 year.    11/23/20  Patient returns today.  Reports that for the last 2 weeks she has been having a continuous headache.  She was prescribed nortriptyline when she had called the clinic which she has not tried so far.  She does not want to be on medications.  Is interested in getting the Botox done today.  She also gets cosmetic Botox for her forehead.  She reports this was a month ago when she got 10 units.  She thinks her some of her headaches might be hormonal in  nature.  She continues to use [place] and Imitrex for abortive therapy.  We talked about her being premenopause.  She is going to stop her birth control.  She is thinking about hysterectomy.  Her mother needed to get hysterectomy done.  We talked about that the migraines might get worse stopping the birth control or after menopause.  She does not need to get the hysterectomy just because of the headaches.  There is no clear evidence that the hysterectomy will help the headaches.  Headaches otherwise unchanged nature.  She has a few bad headaches since she was last seen.    She is sleeping well at this point with the Flexeril at nighttime.  No aneurysm symptoms.    2/24/21  Patient returns today.  Reports that she is having 2 headache days a month with the Botox.  Denies any side effects.  Feels that her neck is less tight with the Botox.  She has stopped the propanolol.  She is no longer using the Imitrex oral tablets.  She still needs to use injections and has to use that twice.  Nurtec has been working well for abortive therapy.  Reports no aneurysm-like symptoms  Scheduled for bilateral blepharoplasty surgery next week.  Does not want us injecting in the forehead.  Is using her cyclobenzaprine at night and on as-needed basis.  Has talked to her OB/GYN and feels that hysterectomy will make things worse and that is on hold for right now.    4/20/21  Patient returns today.  She reports one bad migraine and 3 minor headaches in the last 2 months.  Overall Botox has been working really well.  Reports no side effects.  She has had a blepharoplasty surgery reports some scalp numbness behind her hairline.  Nurtec works really well for abortive therapy.  Imitrex injections occasionally she will have to use for more severe headaches.  Reports no aneurysm symptoms.  Has MRI scheduled for May.  Flexeril has been used as needed.  She stopped the propanolol.  Denies any other hormonal issues.    3/29/22  Patient returns today.   She was seen last year.  Has not done the Botox since then.  Headaches have gotten worse again.  She is having 9 days a month.  Previously used to be behind the right temple/eye and those headaches have not come back.  Exam more in the back of the head radiating up the neck.  There is associated photophobia and phonophobia.  She does use Flexeril as needed.  Is using Imitrex injections and tablets though that does make her sleepy.  Nurtec was working previously but is no longer that helpful.  She is interested in trying Emgality.  Is off the propanolol at this point.  Remains on the Irma birth control.  Does not think this is causing her headaches    7/26/22  Patient returns today  1.  She has been on Emgality for 3 months now.  Reports 1-2 headaches a month.  Generally uses Nurtec but if the headache is more severe needs to use the Imitrex.  We will use 100 mg of Imitrex (50x2).  Rarely needs to use the Imitrex injection.  2.  Is no longer needing to use the propanolol.  For neck pain she is intermittently using the Flexeril.  Uses it about 4 times a week.  3.  Aneurysm is stable on the MRI.  Wants to transition care to me since her neurosurgeon is no longer with [facility].  4.  Remains on the aspirin to control  5.  Is off the propanolol at this point.  6.  Sleeping well at night.    Previous history is reviewed and this is unchanged.    PAST MEDICAL/SURGICAL HISTORY  Past Medical History:   Diagnosis Date     Anemia      Aneurysm (H)      Brain aneurysm      Brain aneurysm      Carotid artery aneurysm (H)      Chronic headaches      Colon polyps      Fatigue      Fatigue      Hx of colonic polyps      Hypothyroid      Hypothyroid      Iron deficiency anemia      Ptosis of eyelid, bilateral      Vitamin D deficiency      Vitamin D deficiency      Patient Active Problem List   Diagnosis     Vitamin D deficiency     Iron deficiency anemia     Insomnia     Hypothyroidism     History of colonic polyps     Fatigue      Chronic migraine     Brain aneurysm   Significant for high cholesterol, migraine headaches, depression, iron deficiency      FAMILY HISTORY  Family History   Problem Relation Age of Onset     Migraines Mother      Gallbladder Disease Maternal Aunt 75.00     Colon Cancer Paternal Aunt 50.00     Kidney Cancer Maternal Grandmother 70.00     Colon Cancer Maternal Grandfather 84.00     Prostate Cancer Paternal Grandfather 80.00     Kidney Cancer Paternal Grandfather      Colon Cancer Paternal Grandfather 78.00     Melanoma Paternal Aunt      Aneurysm Paternal Aunt         Brain     Melanoma Paternal Aunt 48.00     Aneurysm Mother      Melanoma Father      Other - See Comments Daughter         PANDAS     Hashimoto's thyroiditis Daughter      Other - See Comments Son         PANDAS     Aneurysm Maternal Uncle         Brain   Reviewed and negative for neurological conditions    SOCIAL HISTORY  Social History     Tobacco Use     Smoking status: Never Smoker     Smokeless tobacco: Never Used   Substance Use Topics     Alcohol use: Yes     Comment: rare     Drug use: Never       SYSTEMS REVIEW  Twelve-system ROS was done and other than the HPI this was negative.   No new issues/concerns reported today.    MEDICATIONS  acetaminophen (TYLENOL) 500 MG tablet, Take 500 mg by mouth every 6 hours as needed for mild pain  doxylamine (UNISOM) 25 MG TABS tablet, Take 25 mg by mouth nightly as needed  drospirenone-ethinyl estradiol (ADRIÁN) 3-0.03 MG tablet, Take 1 tablet by mouth daily  ibuprofen (ADVIL/MOTRIN) 800 MG tablet, Take 800 mg by mouth every 8 hours as needed for moderate pain  melatonin 3 MG tablet, Take 1 mg by mouth nightly as needed for sleep  Rimegepant Sulfate (NURTEC) 75 MG TBDP, Take 1 tablet by mouth as needed (Migraine)  SUMAtriptan (IMITREX STATDOSE) 6 MG/0.5ML pen injector kit,   tretinoin (RETIN-A) 0.1 % external cream, APPLY PEA SIZED AMOUNT TOPICALLY TO THE AFFECTED AREA EVERY DAY AT BEDTIME AS  DIRECTED  vitamin D2 (ERGOCALCIFEROL) 37006 units (1250 mcg) capsule, TAKE 1 CAPSULE BY MOUTH WEEKLY  escitalopram (LEXAPRO) 10 MG tablet, Take 1 tablet (10 mg) by mouth daily    Botulinum Toxin Type A (BOTOX) 200 units injection 200 Units       PHYSICAL EXAMINATION  VITALS: /74   Pulse 76   Resp 18   Wt 64.4 kg (142 lb)   BMI 25.15 kg/m    GENERAL: Healthy appearing, alert, no acute distress, normal habitus.  CARDIOVASCULAR: Ext warm and well perfused. Pulses present.   NEUROLOGICAL: Patient is awake and oriented to self, place and time. Attention span is normal. Memory grossly intact. Language is fluent and follows commands appropriately. Appropriate fund of knowledge. Cranial nerves 2-12 are intact. There is no pronator drift. Motor exam shows 5/5 strength in all extremities. Tone is symmetric bilaterally in upper and lower extremities. Finger to nose is without dysmetria. Gait is normal and the patient is able to do tandem walk.  Exam unchanged from last visit.    DIAGNOSTICS  DSA  Conically-shaped right internal carotid artery superior hypophyseal   aneurysm measures 1.3 mm deep by 1.7 mm x 2.1 mm across. The aneurysm neck   measures up to 2.1 mm in maximum dimension. There is very mild fusiform   ectasia of the paraclinoid segment   giving rise to this more focal saccular aneurysm.    DSA 2020  1. 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    2. No evidence of new intracranial aneurysm.    Results were discussed with the patient and conveyed to the neurosurgical team immediately following the procedure.    Evaluation and stenosis determination based on NASCET criteria.    MRI  HEAD MRI:  1.  Negative brain MRI. No acute intracranial finding. No evidence for recent  ischemia, intracranial hemorrhage, or mass.     HEAD MRA:  1.  Approximately 2 mm outpouching in the region of the  right carotid cave may  reflect a small aneurysm. This is near the dural rings and could be intradural  in location.  2.  Otherwise negative brain MRA.        RELEVANT LABS  Ferritin 67    MRA   HEAD MRI:   1.  Normal head MRI.     HEAD MRA:   1.  Accounting for differences in modality, no significant change in the size or morphology of a 1.3 x 1.7 x 2.1 mm right internal carotid artery superior hypophyseal aneurysm.   2.  No evidence of new intracranial aneurysm, high-flow vascular malformation or high-grade stenosis.     NECK MRA:  1.  Normal neck MRA.  MRA -exam stable.  HEAD MRI:   1.  No evidence for recent infarction.     2.  No pathologic enhancement.     3.  No change from previous MRI brain evaluation 2021 as above.     HEAD MRA:   1.  Stable small unruptured right superior hypophyseal aneurysm. No evidence for adjacent hemorrhage or differential appearance of the aneurysm with respect to size/morphology and orientation. No new or progressive enlargement of aneurysms noted. Overall   [place] [facility] MRA stable from prior study.     NECK MRA:  1.  No hemodynamic stenosis. Stable MRA from previous evaluation.      IMPRESSION/REPORT/PLAN  Insomnia, unspecified type  Chronic migraine  Cerebral aneurysm, nonruptured-stable  History of blepharoplasty surgery  Neck tightness/neck pain  Question hormonal migraines    This is a 47 year old female chronic migraines, unrelated cerebral aneurysm, insomnia.  Headaches had improved with the Botox that this is no longer approved by the insurance.  There might be a hormonal component as previously discussed with the patient.      For  of headaches:-she is using Nurtec and has stopped using Imitrex, Flexeril, propanolol, caffeine combination.  She is now back using Nurtec and then for breakthrough headaches using Imitrex tablets and injections.  We will switch from 50 mg Imitrex 100 mg Imitrex as 50 mg Imitrex is not enough for    Prevention of  headaches with Botox is no longer being approved with the insurance company.  Emgality is working well and will continue.  She has tried Botox, tizanidine, Flexeril, propanolol in the past.    She is sleeping well at night.  Can use Flexeril at nighttime as needed for insomnia neck stiffness.  Working well.  Continue.  Uses 3-4 Flexeril per week.    Further cerebral aneurysm she would need yearly scans though I think a MRA would be sufficient and she does not need a full diagnostic angiogram.  MRA from this year shows stable aneurysm.  Will need a repeat scan this year.  Her neurosurgeon has left the organization she is looking to transition the care to me.  We will do annual MRIs.    Her headaches might be hormonal and previously options were discussed with the patient in the past.  She can continue birth control per direction of OB/GYN.  She remains on the Irma.    I can see her back in 1 year.    -     MRA Brain ([place] [facility] [place]) w/o Contrast; Future    -     galcanezumab-gnlm (EMGALITY) 120 MG/ML injection; Inject 1 mL (120 mg) Subcutaneous every 28 days  -     cyclobenzaprine (FLEXERIL) 5 MG tablet; Take 1 tablet (5 mg) by mouth 3 times daily as needed for muscle spasms  -     SUMAtriptan (IMITREX) 6 MG/0.5ML injection; Inject 0.5 mLs (6 mg) Subcutaneous as needed for migraine (Headache)  -     SUMAtriptan (IMITREX) 100 MG tablet; Take 1 tablet (100 mg) by mouth at onset of headache for migraine Can repeat in 2 hours if needed    Return in about 1 year (around 7/26/2023) for In-Clinic Visit (must), call later closer to that time.    Over 37 minutes were spent coordinating the care for the patient on the day of the encounter.  This includes previsit, during visit and post visit activities as documented above.  Reviewing MRI.  Multiple problems reviewed/addressed.  Prescription management.  (Activities include but not inclusive of reviewing chart, reviewing outside records, reviewing labs and imaging study  results as well as the images, patient visit time including getting history and exam,  use if applicable, review of test results with the patient and coming up with a plan in a shared model, counseling patient and family, education and answering patient questions, EMR , EMR diagnosis entry and problem list management, medication reconciliation and prescription management if applicable, paperwork if applicable, printing documents and documentation of the visit activities.)      King Taylor MD  Neurologist  [facility]  Tel:- [phone]    This note was dictated using voice recognition software.  Any grammatical or context distortions are unintentional and inherent to the software.        Again, thank you for allowing me to participate in the care of your patient.        Sincerely,        King Taylor MD

## 2022-07-26 NOTE — NURSING NOTE
Chief Complaint   Patient presents with     Follow Up     Migraines     Yuko Matute MA,CMA,10:27 AM

## 2022-07-26 NOTE — PROGRESS NOTES
NEUROLOGY OUTPATIENT PROGRESS NOTE   Jul 26, 2022     CHIEF COMPLAINT/REASON FOR VISIT/REASON FOR CONSULT  Patient presents with:  Follow Up: Migraines     REASON FOR CONSULTATION- Headaches    HISTORY OF PRESENT ILLNESS  Shyann Jurado is a 47 year old female seen today for headaches. She reports that her headache started about 2-1/2 years ago. They have always been sporadic once or twice a month. No clear triggers for the headaches have been identified. To be related to birth control and she was switched from Irma to Sarah. This did not significantly help the headaches. There is some neck tension involved which could be leading to the headaches. Headaches generally are behind the right eye with some sharp pain photophobia and phonophobia. She does have a history of a cerebral aneurysm identified on the MRI done for headaches. Headaches are thought to be unrelated to the aneurysm. She is currently followed by Dr. Álvarez who plans to do yearly MRI brain to evaluate for aneurysms. Imitrex 50 mg has been beneficial in the past. She is also been using Flexeril intermittently. There is some history of iron deficiency. She has difficulty sleeping and uses melatonin and Unisom. Tizanidine did not provide any benefit and patient was switched to Flexeril. She has had some hair loss side effects with the Topamax. Propanolol tried last time could not be tolerated because of side effects and she stopped the medication. Imitrex injections have also been prescribed for abortive therapy though she mainly uses the oral tablets.    Patient returns today reports that she is using 1 tablet of Flexeril every night. Headaches have significantly improved and she only has one headache in the last 6 weeks since I saw her. She used a combination of propanolol Flexeril Imitrex and caffeine as abortive therapy which worked well for her. Has gained some weight on the propanolol. Currently is off the propanolol on every day basis. Reports  that her headache was at the time of the menstrual cycle. She is planning on seeing her OB/GYN may be change of birth control. Complains of some spotting. She is sleeping better with the Flexeril.    10/4/20  Patient returns today.  She reports that in the summer months she did not have any headaches no recently headaches have come back.  Headaches unchanged in nature.  The headache is still behind the right eye.  She wonders if the headaches might be related to her cycle being a bit off though is not completely sure what caused it.  There was some stress involved because her daughter tested positive for COVID though that should have made the headaches worse during summertime.  She did have one headache in September this started with right-sided neck tension and was slightly more severe than her baseline headaches.  She is using propanolol Flexeril Imitrex and caffeine for abortive therapy.  This combination seems to work for her.  Is not taking any preventive medication at this point.  Is using Flexeril to help with sleep.  Has rarely (about 2 times) needed to use the Imitrex injection.    She did receive a diagnostic angiogram and her aneurysm is stable.  This plans to do another angiogram in 1 year.    11/23/20  Patient returns today.  Reports that for the last 2 weeks she has been having a continuous headache.  She was prescribed nortriptyline when she had called the clinic which she has not tried so far.  She does not want to be on medications.  Is interested in getting the Botox done today.  She also gets cosmetic Botox for her forehead.  She reports this was a month ago when she got 10 units.  She thinks her some of her headaches might be hormonal in nature.  She continues to use [place]ell and Imitrex for abortive therapy.  We talked about her being premenopause.  She is going to stop her birth control.  She is thinking about hysterectomy.  Her mother needed to get hysterectomy done.  We talked about that the  migraines might get worse stopping the birth control or after menopause.  She does not need to get the hysterectomy just because of the headaches.  There is no clear evidence that the hysterectomy will help the headaches.  Headaches otherwise unchanged nature.  She has a few bad headaches since she was last seen.    She is sleeping well at this point with the Flexeril at nighttime.  No aneurysm symptoms.    2/24/21  Patient returns today.  Reports that she is having 2 headache days a month with the Botox.  Denies any side effects.  Feels that her neck is less tight with the Botox.  She has stopped the propanolol.  She is no longer using the Imitrex oral tablets.  She still needs to use injections and has to use that twice.  Nurtec has been working well for abortive therapy.  Reports no aneurysm-like symptoms  Scheduled for bilateral blepharoplasty surgery next week.  Does not want us injecting in the forehead.  Is using her cyclobenzaprine at night and on as-needed basis.  Has talked to her OB/GYN and feels that hysterectomy will make things worse and that is on hold for right now.    4/20/21  Patient returns today.  She reports one bad migraine and 3 minor headaches in the last 2 months.  Overall Botox has been working really well.  Reports no side effects.  She has had a blepharoplasty surgery reports some scalp numbness behind her hairline.  Nurtec works really well for abortive therapy.  Imitrex injections occasionally she will have to use for more severe headaches.  Reports no aneurysm symptoms.  Has MRI scheduled for May.  Flexeril has been used as needed.  She stopped the propanolol.  Denies any other hormonal issues.    3/29/22  Patient returns today.  She was seen last year.  Has not done the Botox since then.  Headaches have gotten worse again.  She is having 9 days a month.  Previously used to be behind the right temple/eye and those headaches have not come back.  Exam more in the back of the head  radiating up the neck.  There is associated photophobia and phonophobia.  She does use Flexeril as needed.  Is using Imitrex injections and tablets though that does make her sleepy.  Nurtec was working previously but is no longer that helpful.  She is interested in trying Emgality.  Is off the propanolol at this point.  Remains on the Irma birth control.  Does not think this is causing her headaches    7/26/22  Patient returns today  1.  She has been on Emgality for 3 months now.  Reports 1-2 headaches a month.  Generally uses Nurtec but if the headache is more severe needs to use the Imitrex.  We will use 100 mg of Imitrex (50x2).  Rarely needs to use the Imitrex injection.  2.  Is no longer needing to use the propanolol.  For neck pain she is intermittently using the Flexeril.  Uses it about 4 times a week.  3.  Aneurysm is stable on the MRI.  Wants to transition care to me since her neurosurgeon is no longer with [place].  4.  Remains on the aspirin to control  5.  Is off the propanolol at this point.  6.  Sleeping well at night.    Previous history is reviewed and this is unchanged.    PAST MEDICAL/SURGICAL HISTORY  Past Medical History:   Diagnosis Date     Anemia      Aneurysm (H)      Brain aneurysm      Brain aneurysm      Carotid artery aneurysm (H)      Chronic headaches      Colon polyps      Fatigue      Fatigue      Hx of colonic polyps      Hypothyroid      Hypothyroid      Iron deficiency anemia      Ptosis of eyelid, bilateral      Vitamin D deficiency      Vitamin D deficiency      Patient Active Problem List   Diagnosis     Vitamin D deficiency     Iron deficiency anemia     Insomnia     Hypothyroidism     History of colonic polyps     Fatigue     Chronic migraine     Brain aneurysm   Significant for high cholesterol, migraine headaches, depression, iron deficiency      FAMILY HISTORY  Family History   Problem Relation Age of Onset     Migraines Mother      Gallbladder Disease Maternal Aunt 75.00      Colon Cancer Paternal Aunt 50.00     Kidney Cancer Maternal Grandmother 70.00     Colon Cancer Maternal Grandfather 84.00     Prostate Cancer Paternal Grandfather 80.00     Kidney Cancer Paternal Grandfather      Colon Cancer Paternal Grandfather 78.00     Melanoma Paternal Aunt      Aneurysm Paternal Aunt         Brain     Melanoma Paternal Aunt 48.00     Aneurysm Mother      Melanoma Father      Other - See Comments Daughter         ELIAS     Hashimoto's thyroiditis Daughter      Other - See Comments Son         PANDAS     Aneurysm Maternal Uncle         Brain   Reviewed and negative for neurological conditions    SOCIAL HISTORY  Social History     Tobacco Use     Smoking status: Never Smoker     Smokeless tobacco: Never Used   Substance Use Topics     Alcohol use: Yes     Comment: rare     Drug use: Never       SYSTEMS REVIEW  Twelve-system ROS was done and other than the HPI this was negative.   No new issues/concerns reported today.    MEDICATIONS  acetaminophen (TYLENOL) 500 MG tablet, Take 500 mg by mouth every 6 hours as needed for mild pain  doxylamine (UNISOM) 25 MG TABS tablet, Take 25 mg by mouth nightly as needed  drospirenone-ethinyl estradiol (ADRIÁN) 3-0.03 MG tablet, Take 1 tablet by mouth daily  ibuprofen (ADVIL/MOTRIN) 800 MG tablet, Take 800 mg by mouth every 8 hours as needed for moderate pain  melatonin 3 MG tablet, Take 1 mg by mouth nightly as needed for sleep  Rimegepant Sulfate (NURTEC) 75 MG TBDP, Take 1 tablet by mouth as needed (Migraine)  SUMAtriptan (IMITREX STATDOSE) 6 MG/0.5ML pen injector kit,   tretinoin (RETIN-A) 0.1 % external cream, APPLY PEA SIZED AMOUNT TOPICALLY TO THE AFFECTED AREA EVERY DAY AT BEDTIME AS DIRECTED  vitamin D2 (ERGOCALCIFEROL) 02642 units (1250 mcg) capsule, TAKE 1 CAPSULE BY MOUTH WEEKLY  escitalopram (LEXAPRO) 10 MG tablet, Take 1 tablet (10 mg) by mouth daily    Botulinum Toxin Type A (BOTOX) 200 units injection 200 Units       PHYSICAL  EXAMINATION  VITALS: /74   Pulse 76   Resp 18   Wt 64.4 kg (142 lb)   BMI 25.15 kg/m    GENERAL: Healthy appearing, alert, no acute distress, normal habitus.  CARDIOVASCULAR: Ext warm and well perfused. Pulses present.   NEUROLOGICAL: Patient is awake and oriented to self, place and time. Attention span is normal. Memory grossly intact. Language is fluent and follows commands appropriately. Appropriate fund of knowledge. Cranial nerves 2-12 are intact. There is no pronator drift. Motor exam shows 5/5 strength in all extremities. Tone is symmetric bilaterally in upper and lower extremities. Finger to nose is without dysmetria. Gait is normal and the patient is able to do tandem walk.  Exam unchanged from last visit.    DIAGNOSTICS  DSA  Conically-shaped right internal carotid artery superior hypophyseal   aneurysm measures 1.3 mm deep by 1.7 mm x 2.1 mm across. The aneurysm neck   measures up to 2.1 mm in maximum dimension. There is very mild fusiform   ectasia of the paraclinoid segment   giving rise to this more focal saccular aneurysm.    DSA 2020  1. 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    2. No evidence of new intracranial aneurysm.    Results were discussed with the patient and conveyed to the neurosurgical team immediately following the procedure.    Evaluation and stenosis determination based on NASCET criteria.    MRI  HEAD MRI:  1.  Negative brain MRI. No acute intracranial finding. No evidence for recent  ischemia, intracranial hemorrhage, or mass.     HEAD MRA:  1.  Approximately 2 mm outpouching in the region of the right carotid cave may  reflect a small aneurysm. This is near the dural rings and could be intradural  in location.  2.  Otherwise negative brain MRA.        RELEVANT LABS  Ferritin 67    MRA 2021  HEAD MRI:   1.  Normal head MRI.     HEAD MRA:   1.   Accounting for differences in modality, no significant change in the size or morphology of a 1.3 x 1.7 x 2.1 mm right internal carotid artery superior hypophyseal aneurysm.   2.  No evidence of new intracranial aneurysm, high-flow vascular malformation or high-grade stenosis.     NECK MRA:  1.  Normal neck MRA.  MRA -exam stable.  HEAD MRI:   1.  No evidence for recent infarction.     2.  No pathologic enhancement.     3.  No change from previous MRI brain evaluation 2021 as above.     HEAD MRA:   1.  Stable small unruptured right superior hypophyseal aneurysm. No evidence for adjacent hemorrhage or differential appearance of the aneurysm with respect to size/morphology and orientation. No new or progressive enlargement of aneurysms noted. Overall   [place] [facility] [place] MRA stable from prior study.     NECK MRA:  1.  No hemodynamic stenosis. Stable MRA from previous evaluation.      IMPRESSION/REPORT/PLAN  Insomnia, unspecified type  Chronic migraine  Cerebral aneurysm, nonruptured-stable  History of blepharoplasty surgery  Neck tightness/neck pain  Question hormonal migraines    This is a 47 year old female chronic migraines, unrelated cerebral aneurysm, insomnia.  Headaches had improved with the Botox that this is no longer approved by the insurance.  There might be a hormonal component as previously discussed with the patient.      For  of headaches:-she is using Nurtec and has stopped using Imitrex, Flexeril, propanolol, caffeine combination.  She is now back using Nurtec and then for breakthrough headaches using Imitrex tablets and injections.  We will switch from 50 mg Imitrex 100 mg Imitrex as 50 mg Imitrex is not enough for    Prevention of headaches with Botox is no longer being approved with the insurance company.  Emgality is working well and will continue.  She has tried Botox, tizanidine, Flexeril, propanolol in the past.    She is sleeping well at night.  Can use Flexeril at nighttime as  needed for insomnia neck stiffness.  Working well.  Continue.  Uses 3-4 Flexeril per week.    Further cerebral aneurysm she would need yearly scans though I think a MRA would be sufficient and she does not need a full diagnostic angiogram.  MRA from this year shows stable aneurysm.  Will need a repeat scan this year.  Her neurosurgeon has left the organization she is looking to transition the care to me.  We will do annual MRIs.    Her headaches might be hormonal and previously options were discussed with the patient in the past.  She can continue birth control per direction of OB/GYN.  She remains on the Irma.    I can see her back in 1 year.    -     MRA Brain ([place] [facility] [place]) w/o Contrast; Future    -     galcanezumab-gnlm (EMGALITY) 120 MG/ML injection; Inject 1 mL (120 mg) Subcutaneous every 28 days  -     cyclobenzaprine (FLEXERIL) 5 MG tablet; Take 1 tablet (5 mg) by mouth 3 times daily as needed for muscle spasms  -     SUMAtriptan (IMITREX) 6 MG/0.5ML injection; Inject 0.5 mLs (6 mg) Subcutaneous as needed for migraine (Headache)  -     SUMAtriptan (IMITREX) 100 MG tablet; Take 1 tablet (100 mg) by mouth at onset of headache for migraine Can repeat in 2 hours if needed    Return in about 1 year (around 7/26/2023) for In-Clinic Visit (must), call later closer to that time.    Over 37 minutes were spent coordinating the care for the patient on the day of the encounter.  This includes previsit, during visit and post visit activities as documented above.  Reviewing MRI.  Multiple problems reviewed/addressed.  Prescription management.  (Activities include but not inclusive of reviewing chart, reviewing outside records, reviewing labs and imaging study results as well as the images, patient visit time including getting history and exam,  use if applicable, review of test results with the patient and coming up with a plan in a shared model, counseling patient and family, education and answering patient  questions, EMR , EMR diagnosis entry and problem list management, medication reconciliation and prescription management if applicable, paperwork if applicable, printing documents and documentation of the visit activities.)      King Taylor MD  Neurologist  [facility]  Tel:- [phone]    This note was dictated using voice recognition software.  Any grammatical or context distortions are unintentional and inherent to the software.

## 2022-08-02 DIAGNOSIS — G43.719 INTRACTABLE CHRONIC MIGRAINE WITHOUT AURA AND WITHOUT STATUS MIGRAINOSUS: Primary | ICD-10-CM

## 2022-08-02 RX ORDER — CEFUROXIME AXETIL 250 MG/1
6 TABLET ORAL
Qty: 1 KIT | Refills: 0 | Status: SHIPPED | OUTPATIENT
Start: 2022-08-02 | End: 2023-01-10

## 2022-08-02 NOTE — TELEPHONE ENCOUNTER
Pharmacy sent message requesting and order for the Sumatriptan pens/kit.   Vial was sent previously.   Order T'd.

## 2022-08-15 ENCOUNTER — TELEPHONE (OUTPATIENT)
Dept: CONSULT | Facility: CLINIC | Age: 47
End: 2022-08-15

## 2022-08-15 NOTE — TELEPHONE ENCOUNTER
[facility] Call Center    Phone Message    May a detailed message be left on voicemail: yes     Reason for Call: Other: Mom called looking to schedule an appointment with Dr Barahona for genetic testing per Dr Barahona but there is no referral in EPIC and no notes or messages with instructions to schedule. Dr Barahona does see patient's daughter (last week). She would like a callback to see if there can be a referral put in from Dr Barahona.     Action Taken: Message routed to:  Other: peds genetics    Travel Screening: Not Applicable

## 2022-08-17 ENCOUNTER — DOCUMENTATION ONLY (OUTPATIENT)
Dept: CONSULT | Facility: CLINIC | Age: 47
End: 2022-08-17

## 2022-08-17 NOTE — TELEPHONE ENCOUNTER
Spoke with patient and assisted in scheduling GC only visit, per Dr. Barahona' recommendation.     Future Appointments   Date Time Provider Department Center   9/9/2022 10:00 AM [facility]

## 2022-08-17 NOTE — PROGRESS NOTES
Shyann's daughter Latricia was seen by Dr. Barahona and I last week. After taking the family history, he and I discussed referring Shyann for her own genetic counseling visit to discuss her personal and family history of aneurysm. Care providers and this family are welcome to reach out to me with any questions.     MS LUIS EDUARDO Vanegas Genetic Counseling Intern  [facility]   Phone: [phone]  Email: [email or web address]

## 2022-08-31 ASSESSMENT — ENCOUNTER SYMPTOMS
HEADACHES: 1
DYSURIA: 0
FEVER: 0
MYALGIAS: 0
ARTHRALGIAS: 0
EYE PAIN: 0
ABDOMINAL PAIN: 0
FREQUENCY: 0
WEAKNESS: 0
JOINT SWELLING: 0
COUGH: 0
DIARRHEA: 0
NAUSEA: 0
BREAST MASS: 0
HEMATURIA: 0
SHORTNESS OF BREATH: 0
HEMATOCHEZIA: 0
HEARTBURN: 0
NERVOUS/ANXIOUS: 0
PARESTHESIAS: 0
DIZZINESS: 0
CONSTIPATION: 0
PALPITATIONS: 0
CHILLS: 0
SORE THROAT: 0

## 2022-09-07 ENCOUNTER — OFFICE VISIT (OUTPATIENT)
Dept: FAMILY MEDICINE | Facility: CLINIC | Age: 47
End: 2022-09-07
Payer: COMMERCIAL

## 2022-09-07 VITALS
SYSTOLIC BLOOD PRESSURE: 113 MMHG | RESPIRATION RATE: 16 BRPM | HEART RATE: 84 BPM | TEMPERATURE: 98.1 F | BODY MASS INDEX: 26.31 KG/M2 | DIASTOLIC BLOOD PRESSURE: 73 MMHG | WEIGHT: 148.5 LBS | HEIGHT: 63 IN

## 2022-09-07 DIAGNOSIS — E03.8 OTHER SPECIFIED HYPOTHYROIDISM: ICD-10-CM

## 2022-09-07 DIAGNOSIS — Z86.0100 HISTORY OF COLONIC POLYPS: ICD-10-CM

## 2022-09-07 DIAGNOSIS — I67.1 BRAIN ANEURYSM: ICD-10-CM

## 2022-09-07 DIAGNOSIS — Z30.011 INITIATION OF OCP (BCP): ICD-10-CM

## 2022-09-07 DIAGNOSIS — B35.1 ONYCHOMYCOSIS: ICD-10-CM

## 2022-09-07 DIAGNOSIS — D50.8 OTHER IRON DEFICIENCY ANEMIA: ICD-10-CM

## 2022-09-07 DIAGNOSIS — Z00.00 ENCOUNTER FOR PREVENTATIVE ADULT HEALTH CARE EXAMINATION: Primary | ICD-10-CM

## 2022-09-07 DIAGNOSIS — E55.9 VITAMIN D DEFICIENCY: ICD-10-CM

## 2022-09-07 LAB
ALBUMIN SERPL BCG-MCNC: 4.1 G/DL (ref 3.5–5.2)
ALP SERPL-CCNC: 74 U/L (ref 35–104)
ALT SERPL W P-5'-P-CCNC: 14 U/L (ref 10–35)
ANION GAP SERPL CALCULATED.3IONS-SCNC: 10 MMOL/L (ref 7–15)
AST SERPL W P-5'-P-CCNC: 25 U/L (ref 10–35)
BILIRUB SERPL-MCNC: 0.2 MG/DL
BUN SERPL-MCNC: 10.8 MG/DL (ref 6–20)
CALCIUM SERPL-MCNC: 9.3 MG/DL (ref 8.6–10)
CHLORIDE SERPL-SCNC: 104 MMOL/L (ref 98–107)
CHOLEST SERPL-MCNC: 214 MG/DL
CREAT SERPL-MCNC: 0.69 MG/DL (ref 0.51–0.95)
DEPRECATED HCO3 PLAS-SCNC: 25 MMOL/L (ref 22–29)
ERYTHROCYTE [DISTWIDTH] IN BLOOD BY AUTOMATED COUNT: 13.1 % (ref 10–15)
FERRITIN SERPL-MCNC: 578 NG/ML (ref 6–175)
GFR SERPL CREATININE-BSD FRML MDRD: >90 ML/MIN/1.73M2
GLUCOSE SERPL-MCNC: 98 MG/DL (ref 70–99)
HCT VFR BLD AUTO: 36.7 % (ref 35–47)
HDLC SERPL-MCNC: 67 MG/DL
HGB BLD-MCNC: 12.1 G/DL (ref 11.7–15.7)
LDLC SERPL CALC-MCNC: 109 MG/DL
MCH RBC QN AUTO: 28.3 PG (ref 26.5–33)
MCHC RBC AUTO-ENTMCNC: 33 G/DL (ref 31.5–36.5)
MCV RBC AUTO: 86 FL (ref 78–100)
NONHDLC SERPL-MCNC: 147 MG/DL
PLATELET # BLD AUTO: 246 10E3/UL (ref 150–450)
POTASSIUM SERPL-SCNC: 4.7 MMOL/L (ref 3.4–5.3)
PROT SERPL-MCNC: 7 G/DL (ref 6.4–8.3)
RBC # BLD AUTO: 4.28 10E6/UL (ref 3.8–5.2)
SODIUM SERPL-SCNC: 139 MMOL/L (ref 136–145)
TRIGL SERPL-MCNC: 188 MG/DL
TSH SERPL DL<=0.005 MIU/L-ACNC: 3.94 UIU/ML (ref 0.3–4.2)
WBC # BLD AUTO: 8.6 10E3/UL (ref 4–11)

## 2022-09-07 PROCEDURE — 90471 IMMUNIZATION ADMIN: CPT | Performed by: FAMILY MEDICINE

## 2022-09-07 PROCEDURE — 84443 ASSAY THYROID STIM HORMONE: CPT | Performed by: FAMILY MEDICINE

## 2022-09-07 PROCEDURE — 82306 VITAMIN D 25 HYDROXY: CPT | Performed by: FAMILY MEDICINE

## 2022-09-07 PROCEDURE — 90746 HEPB VACCINE 3 DOSE ADULT IM: CPT | Performed by: FAMILY MEDICINE

## 2022-09-07 PROCEDURE — 36415 COLL VENOUS BLD VENIPUNCTURE: CPT | Performed by: FAMILY MEDICINE

## 2022-09-07 PROCEDURE — 85027 COMPLETE CBC AUTOMATED: CPT | Performed by: FAMILY MEDICINE

## 2022-09-07 PROCEDURE — 99396 PREV VISIT EST AGE 40-64: CPT | Mod: 25 | Performed by: FAMILY MEDICINE

## 2022-09-07 PROCEDURE — 80061 LIPID PANEL: CPT | Performed by: FAMILY MEDICINE

## 2022-09-07 PROCEDURE — 82728 ASSAY OF FERRITIN: CPT | Performed by: FAMILY MEDICINE

## 2022-09-07 PROCEDURE — 80053 COMPREHEN METABOLIC PANEL: CPT | Performed by: FAMILY MEDICINE

## 2022-09-07 RX ORDER — DROSPIRENONE AND ETHINYL ESTRADIOL 0.03MG-3MG
KIT ORAL
Qty: 84 TABLET | Refills: 3 | Status: SHIPPED | OUTPATIENT
Start: 2022-09-07 | End: 2023-10-05

## 2022-09-07 RX ORDER — ERGOCALCIFEROL 1.25 MG/1
CAPSULE, LIQUID FILLED ORAL
Qty: 12 CAPSULE | Refills: 3 | Status: SHIPPED | OUTPATIENT
Start: 2022-09-07 | End: 2024-01-31

## 2022-09-07 ASSESSMENT — ENCOUNTER SYMPTOMS
JOINT SWELLING: 0
PARESTHESIAS: 0
FREQUENCY: 0
NAUSEA: 0
EYE PAIN: 0
HEMATOCHEZIA: 0
CONSTIPATION: 0
CHILLS: 0
DIARRHEA: 0
WEAKNESS: 0
ABDOMINAL PAIN: 0
FEVER: 0
DIZZINESS: 0
COUGH: 0
HEARTBURN: 0
ARTHRALGIAS: 0
BREAST MASS: 0
SHORTNESS OF BREATH: 0
PALPITATIONS: 0
HEADACHES: 1
DYSURIA: 0
MYALGIAS: 0
HEMATURIA: 0
SORE THROAT: 0
NERVOUS/ANXIOUS: 0

## 2022-09-07 NOTE — PATIENT INSTRUCTIONS
If you are interested in the new shingles shot, Shingrix, please check with insurance for coverage either in clinic or at a pharmacy. It is a 2 shot series 2-6 months apart.   Due at age 48.    Seeing genetics tomorrow to be tested for possible Erler's Danlos syndrome.  If they feel you need an echo or heart ultrasound let me know and I will place the order.      Follows with hematology for iron infusions when needed.    Sees dermatology for full body skin checks.    Follows with neurology for migraine and orders for the brain MRA for brain aneurysm check yearly.      Health Maintenance   Topic Date Due    ANNUAL REVIEW OF HM ORDERS  Today    HIV SCREENING  NA    HEPATITIS C SCREENING  NA    PREVENTIVE CARE VISIT  Today    INFLUENZA VACCINE (1) Will get later    MAMMO SCREENING  02/08/2023    ZOSTER IMMUNIZATION (1 of 2) Due age 48    COLORECTAL CANCER SCREENING  12/11/2023    DTAP/TDAP/TD IMMUNIZATION (3 - Td or Tdap) 04/14/2024    HPV TEST  05/19/2024    LIPID  Today    ADVANCE CARE PLANNING  We would like a copy please    PAP  05/19/2024    PHQ-2 (once per calendar year)  Completed    COVID-19 Vaccine  Completed    Pneumococcal Vaccine: Pediatrics (0 to 5 Years) and At-Risk Patients (6 to 64 Years)  Age 65    IPV IMMUNIZATION  Aged Out    MENINGITIS IMMUNIZATION  Aged Out    HEPATITIS B IMMUNIZATION  3rd shot and done today      Patient/Caregiver provided printed discharge information.

## 2022-09-07 NOTE — PROGRESS NOTES
SUBJECTIVE:   CC: Shyann Jurado is an 47 year old woman who presents for preventive health visit.       Patient has been advised of split billing requirements and indicates understanding: Yes  Healthy Habits:     Getting at least 3 servings of Calcium per day:  Yes    Bi-annual eye exam:  Yes    Dental care twice a year:  Yes    Sleep apnea or symptoms of sleep apnea:  None    Diet:  Regular (no restrictions)    Frequency of exercise:  2-3 days/week    Duration of exercise:  15-30 minutes    Taking medications regularly:  Yes    Medication side effects:  None    PHQ-2 Total Score: 0    Additional concerns today:  No    Brain aneurysm:  Saw neurosurgeon andnosurgery needed, but needs annula survelance.    Migraine:  Sees Neuro and they will order brain MRA.    Perimenopause:  Does continuous birth control for 3 moths then menses every 3 months to help prevent migraine some breakthrough bleeding. Has not skipped a period.    Iron Def:; recent infusion with hematology    Onychomycosis:  On Terbinafine per podiatry.  Would like liver function checked.    Social:  Daughter has long haul Covid with POTS, mast cell issues, and posisble Erlos Danlos.  Shyann has an apt with genetics to discuss testing.    Health Maintenance   Topic Date Due     ANNUAL REVIEW OF HM ORDERS  Today     HIV SCREENING  NA     HEPATITIS C SCREENING  NA     PREVENTIVE CARE VISIT  Today     INFLUENZA VACCINE (1) Will get later     MAMMO SCREENING  02/08/2023     ZOSTER IMMUNIZATION (1 of 2) Due age 48     COLORECTAL CANCER SCREENING  12/11/2023     DTAP/TDAP/TD IMMUNIZATION (3 - Td or Tdap) 04/14/2024     HPV TEST  05/19/2024     LIPID  Today     ADVANCE CARE PLANNING  We would like a copy please     PAP  05/19/2024     PHQ-2 (once per calendar year)  Completed     COVID-19 Vaccine  Completed     Pneumococcal Vaccine: Pediatrics (0 to 5 Years) and At-Risk Patients (6 to 64 Years)  Age 63     IPV IMMUNIZATION  Aged Out     MENINGITIS  IMMUNIZATION  Aged Out     HEPATITIS B IMMUNIZATION  3rd shot and done today               Today's PHQ-2 Score:   PHQ-2 ( 1999 Pfizer) 8/31/2022   Q1: Little interest or pleasure in doing things 0   Q2: Feeling down, depressed or hopeless 0   PHQ-2 Score 0   PHQ-2 Total Score (12-17 Years)- Positive if 3 or more points; Administer PHQ-A if positive -   Q1: Little interest or pleasure in doing things Not at all   Q2: Feeling down, depressed or hopeless Not at all   PHQ-2 Score 0       Abuse: Current or Past (Physical, Sexual or Emotional) - No  Do you feel safe in your environment? Yes        Social History     Tobacco Use     Smoking status: Never Smoker     Smokeless tobacco: Never Used   Substance Use Topics     Alcohol use: Yes     Comment: rare         Alcohol Use 8/31/2022   Prescreen: >3 drinks/day or >7 drinks/week? No       Reviewed orders with patient.  Reviewed health maintenance and updated orders accordingly - Yes  Lab work is in process    Breast Cancer Screening:    FHS-7:   Breast CA Risk Assessment (FHS-7) 2/8/2022 8/31/2022   Did any of your first-degree relatives have breast or ovarian cancer? No No   Did any of your relatives have bilateral breast cancer? No No   Did any man in your family have breast cancer? No No   Did any woman in your family have breast and ovarian cancer? No No   Did any woman in your family have breast cancer before age 50 y? No No   Do you have 2 or more relatives with breast and/or ovarian cancer? No No   Do you have 2 or more relatives with breast and/or bowel cancer? Yes No       Mammogram Screening: Recommended annual mammography  Pertinent mammograms are reviewed under the imaging tab.    History of abnormal Pap smear: NO - age 30- 65 PAP every 3 years recommended  PAP / HPV Latest Ref Rng & Units 5/19/2021 1/30/2018   PAP Negative for squamous intraepithelial lesion or malignancy. Negative for squamous intraepithelial lesion or malignancy  Electronically signed by  Sheila Valdovinos CT (ASCP) on 5/26/2021 at 11:50 AM   Negative for squamous intraepithelial lesion or malignancy  Electronically signed by Yuko Kern CT (ASCP) on 2/5/2018 at  2:40 PM     HPV16 NEG Negative Negative   HPV18 NEG Negative Negative   HRHPV NEG Negative Negative     Reviewed and updated as needed this visit by clinical staff   Tobacco  Allergies                 Reviewed and updated as needed this visit by Provider                       Review of Systems   Constitutional: Negative for chills and fever.   HENT: Negative for congestion, ear pain, hearing loss and sore throat.    Eyes: Negative for pain and visual disturbance.   Respiratory: Negative for cough and shortness of breath.    Cardiovascular: Negative for chest pain, palpitations and peripheral edema.   Gastrointestinal: Negative for abdominal pain, constipation, diarrhea, heartburn, hematochezia and nausea.   Breasts:  Negative for tenderness, breast mass and discharge.   Genitourinary: Negative for dysuria, frequency, genital sores, hematuria, pelvic pain, urgency, vaginal bleeding and vaginal discharge.   Musculoskeletal: Negative for arthralgias, joint swelling and myalgias.   Skin: Negative for rash.   Neurological: Positive for headaches. Negative for dizziness, weakness and paresthesias.   Psychiatric/Behavioral: Negative for mood changes. The patient is not nervous/anxious.      CONSTITUTIONAL: NEGATIVE for fever, chills, change in weight  INTEGUMENTARU/SKIN: NEGATIVE for worrisome rashes, moles or lesions  EYES: NEGATIVE for vision changes or irritation  ENT: NEGATIVE for ear, mouth and throat problems  RESP: NEGATIVE for significant cough or SOB  BREAST: NEGATIVE for masses, tenderness or discharge  CV: NEGATIVE for chest pain, palpitations or peripheral edema  GI: NEGATIVE for nausea, abdominal pain, heartburn, or change in bowel habits  : NEGATIVE for unusual urinary or vaginal symptoms. Periods are  "regular.  MUSCULOSKELETAL: NEGATIVE for significant arthralgias or myalgia  NEURO: NEGATIVE for weakness, dizziness or paresthesias  PSYCHIATRIC: NEGATIVE for changes in mood or affect     OBJECTIVE:   /73 (BP Location: Left arm, Patient Position: Sitting, Cuff Size: Adult Regular)   Pulse 84   Temp 98.1  F (36.7  C) (Oral)   Resp 16   Ht 1.594 m (5' 2.75\")   Wt 67.4 kg (148 lb 8 oz)   LMP 08/30/2022   BMI 26.52 kg/m    Physical Exam  GENERAL: healthy, alert and no distress  EYES: Eyes grossly normal to inspection, PERRL and conjunctivae and sclerae normal  HENT: ear canals and TM's normal, nose and mouth without ulcers or lesions  NECK: no adenopathy, no asymmetry, masses, or scars and thyroid normal to palpation  RESP: lungs clear to auscultation - no rales, rhonchi or wheezes  BREAST: normal without masses, tenderness or nipple discharge and no palpable axillary masses or adenopathy  CV: regular rate and rhythm, normal S1 S2, no S3 or S4, no murmur, click or rub, no peripheral edema and peripheral pulses strong  ABDOMEN: soft, nontender, no hepatosplenomegaly, no masses and bowel sounds normal  MS: no gross musculoskeletal defects noted, no edema  SKIN: no suspicious lesions or rashes  NEURO: Normal strength and tone, mentation intact and speech normal  PSYCH: mentation appears normal, affect normal/bright    Diagnostic Test Results:  Labs reviewed in Epic    ASSESSMENT/PLAN:       ICD-10-CM    1. Encounter for preventative adult health care examination  Z00.00 Lipid Profile     Lipid Profile   2. Vitamin D deficiency  E55.9 Vitamin D Deficiency     vitamin D2 (ERGOCALCIFEROL) 71812 units (1250 mcg) capsule     Vitamin D Deficiency   3. Initiation of OCP (BCP)  Z30.011 drospirenone-ethinyl estradiol (ADRIÁN) 3-0.03 MG tablet   4. History of colonic polyps  Z86.010    5. Other iron deficiency anemia  D50.8 CBC with platelets     Ferritin     CBC with platelets     Ferritin   6. Other specified " "hypothyroidism  E03.8 TSH with free T4 reflex     TSH with free T4 reflex   7. Brain aneurysm  I67.1    8. Onychomycosis  B35.1 Comprehensive metabolic panel     Comprehensive metabolic panel     If you are interested in the new shingles shot, Shingrix, please check with insurance for coverage either in clinic or at a pharmacy. It is a 2 shot series 2-6 months apart.   Due at age 48.    Seeing genetics tomorrow to be tested for possible Erler's Danlos syndrome.  If they feel you need an echo or heart ultrasound let me know and I will place the order.      Follows with hematology for iron infusions when needed.    Sees dermatology for full body skin checks.    Follows with neurology for migraine and orders for the brain MRA for brain aneurysm check yearly.    Patient has been advised of split billing requirements and indicates understanding: Yes    COUNSELING:  Reviewed preventive health counseling, as reflected in patient instructions       Advance Care Planning    Estimated body mass index is 26.52 kg/m  as calculated from the following:    Height as of this encounter: 1.594 m (5' 2.75\").    Weight as of this encounter: 67.4 kg (148 lb 8 oz).        She reports that she has never smoked. She has never used smokeless tobacco.      Counseling Resources:  ATP IV Guidelines  Pooled Cohorts Equation Calculator  Breast Cancer Risk Calculator  BRCA-Related Cancer Risk Assessment: FHS-7 Tool  FRAX Risk Assessment  ICSI Preventive Guidelines  Dietary Guidelines for Americans, 2010  USDA's MyPlate  ASA Prophylaxis  Lung CA Screening    Dahlia Walton MD  [facility]  "

## 2022-09-08 LAB — DEPRECATED CALCIDIOL+CALCIFEROL SERPL-MC: 48 UG/L (ref 20–75)

## 2022-09-09 ENCOUNTER — VIRTUAL VISIT (OUTPATIENT)
Dept: CONSULT | Facility: CLINIC | Age: 47
End: 2022-09-09
Attending: GENETIC COUNSELOR, MS
Payer: COMMERCIAL

## 2022-09-09 DIAGNOSIS — Z71.83 ENCOUNTER FOR NONPROCREATIVE GENETIC COUNSELING: ICD-10-CM

## 2022-09-09 DIAGNOSIS — D50.8 OTHER IRON DEFICIENCY ANEMIA: ICD-10-CM

## 2022-09-09 DIAGNOSIS — I67.1 CEREBRAL ANEURYSM: Primary | ICD-10-CM

## 2022-09-09 DIAGNOSIS — Z82.49 FAMILY HISTORY OF AORTIC ANEURYSM: ICD-10-CM

## 2022-09-09 DIAGNOSIS — Z82.49 FAMILY HISTORY OF CEREBRAL ANEURYSM: ICD-10-CM

## 2022-09-09 DIAGNOSIS — M24.9 HYPERMOBILITY OF JOINT: ICD-10-CM

## 2022-09-09 DIAGNOSIS — G43.709 CHRONIC MIGRAINE WITHOUT AURA: ICD-10-CM

## 2022-09-09 PROCEDURE — 96040 HC GENETIC COUNSELING, EACH 30 MINUTES: CPT | Mod: GT,95 | Performed by: GENETIC COUNSELOR, MS

## 2022-09-09 NOTE — PROGRESS NOTES
Shyann Jurado  is being evaluated via a billable video visit.      How would you like to obtain your AVS? "1,2,3 Listo"  For the video visit, send the invitation by: Text to cell phone: [phone]  Will anyone else be joining your video visit? No

## 2022-09-09 NOTE — Clinical Note
9/9/2022      RE: Shyann Jurado  [address] [facility] [address]     Dear Colleague,    Thank you for the opportunity to participate in the care of your patient, Shyann Jurado, at the [facility] at [facility]. Please see a copy of my visit note below.    Shyann Jurado  is being evaluated via a billable video visit.      How would you like to obtain your AVS? MyChart  For the video visit, send the invitation by: Text to cell phone: [phone]  Will anyone else be joining your video visit? No  {If patient encounters technical issues they should call [phone]}        Presenting information:   Shyann Jurado is a 47 year old female with a personal history of cerebral aneurysm and family history of aneurysms, possibly suggestive of familial aneurysm risk. She was referred for genetic counseling by Dr. Barahona (Genetics) after Dr. Barahona' previous appointment with Shyann's daughter Latricia, due to concerns for Shyann for vascular Reba-Danlos syndrome or a familial aneurysm condition. Shyann was seen virtually by video today at the [facility] to further discuss possible genetic contributions to Shyann's history and to obtain informed consent for genetic testing.     Personal History:   Shyann has a history of a cerebral aneurysm at 42 years, found on a MRI being done for her headaches. She is followed annually with brain imaging/angiogram. Shyann notes hypermobility and self reports some easy bruising and skin taking awhile to heal.     Shyann denied joint pain, dislocations, ruptured blood vessels or organs including the uterus, abnormal surgery recoveries, lung concerns/pneumothoraces, known kidney cysts (she notes a past ultrasound due to a kidney infection, though I do not have a copy of these records), heart concerns other than a murmur when born that resolved (last imaging  reportedly ~10 years ago), and eye/vision concerns other than one pupil dilating sometimes (she does not have glasses or contacts). She is 5' 2.5''.    Additional history includes headaches starting in her early 40's, vitamin D deficiency, hypothyroidism, ovarian cysts, iron deficiency, reported malabsorption issues, and pelvic floor surgery for a tear after her daughter's birth (son was born via a ). She notes she has been told she needs lots of anesthesia for procedures. She also has a history of colon polyps, beginning at age 41 and monitored every other year via colonoscopy.      Family History:   A three generation pedigree was obtained today and sent to be scanned into the EMR. The family history was significant for the following:    Shyann's daughter Latricia, 16 years, has a history includes PANDAS, POTS, mast cell, dysautonomia, and COVID complications. See Dr. Barahona' note in Latricia's chart for full details; no genetic testing was recommended at that visit for Latricia. Shyann's son, 13 years, has a history of PANS/PANDAS at age 8-9 and he was diagnosed with Lyme disease last year.     Shyann has one brother, who has a history of ringing in his ears due to superior canal dehiscence. He has two children, one of whom has some GI issues.    Shyann's father is 74 years old. He has a history of melanoma and colon polyps.    Shyann's father has several siblings, including one sister who passed at 55 years from colon cancer. She also had some developmental delays with unknown exact diagnosis. Shyann reports she was fine when born and may have had iron deficiency. While it may not be possible to get more information given she has passed, if additional information about her diagnosis and/or if any other family members have similar history, this would be important to share for possible recurrence information for the family. Shyann notes no major health concerns for her paternal cousins.    Shyann's paternal grandmother  passed from dementia. Shyann's paternal grandfather passed from colon cancer.    Shyann's mother is 75 years old. She was diagnosed with a brain aneurysm at 70, and has had acute pancreatitis (cause unknown). She also has a history of reported easy bruising, stretchy skin, migraines, thyroid disease with thyroid removal, and stomach issues with malabsorption.    Shyann's mother has 9 siblings. One brother  of a brain aneurysm, and one sister had an aortic aneurysm (full details unknown). Another aunt had a sudden onset of dementia in her early 60's. Of Shyann's maternal first cousins, one had leukemia and a sibling of theirs had a cancerous brain tumor (children of Shyann's aunt with dementia).    Shyann's maternal grandmother passed from rectal cancer at an older age. She also had a history of reported stretchy skin, chronic GI/stomach issues, and thyroid issues. Shyann's maternal grandfather passed from colon cancer.    Shyann is of [place], [place]ian, and German ancestry. Consanguinity was denied.    The family history of cancer would be important to share with the family's PCPs for any recommended screening changes. Cancer genetic counseling may also be warranted for the family, especially if any of the colon cancers were diagnosed <50 years, for example starting with Shyann's father due to his history of colon polyps + family history of colon cancer. I will reiterate this when following up with Shyann next.     Discussion:   Genes are long stretches of DNA that are responsible for how our bodies look and how our bodies work. We all have two copies of every gene, one inherited from our mother and one inherited from our father. When there is a change, called a mutation, in a gene it can cause it to not do its job correctly which can cause the signs and symptoms of a genetic condition.  Genetic testing involves analyzing the relevant gene(s) for pathogenic genetic changes, with a goal of getting more information about if  one's history is due to a single genetic cause or not.    Based on Shyann personal and family history, Dr. Barahona recommended genetic testing for her for various genes that can be related to brain aneurysms. Specifically, this would be a panel of genes from the [facility] ([facility]). These genes vary as far as if they are associated with brain aneurysms only or aneurysms + other findings/symptoms. Many of these genes follow an autosomal dominant inheritance pattern (one mutation in the gene is causative for the disorder/risk), though there are some variable inheritance patterns. For example, mutations in a gene can be brand new in an individual, or inherited from one or both parent. If a mutation is identified in one of these genes, additional specific information about what it means for Shyann, as well as inheritance and risk to other family members, will need to be discussed specifically.    One genetic condition associated with increased chance for aneurysms is vascular reba-Danlos syndrome (VEDS). Reba-Danlos syndrome is a group of inherited disorders that affect connective tissues supporting the skin, bones, blood vessels, and many other organs and tissues. Defects in connective tissues cause the signs and symptoms of these conditions, which range from mildly loose joints to life-threatening complications. People who have Reba-Danlos syndrome usually have overly flexible joints and stretchy, fragile skin. A more severe form of the disorder, called vascular Reba-Danlos syndrome, can cause the walls of the blood vessels, intestines, or uterus to rupture. Additional symptoms can include easy bruising, abnormal scarring, and poor wound healing. Most cases of the vascular EDS type result from variants in the COL3A1 gene, although rarely this type is caused by certain COL1A1 gene variants. Shyann was aware that while genetic testing is available for VEDS, good genetic  testing does not exist for some other EDS types, including hypermobile EDS, and diagnosis is therefore based on clinical evaluation for these other type(s).    There are also multiple syndromes associated with increased risk for aneurysm, including Loeys-Honorio syndrome, Marfan syndrome, and polycystic kidney disease. These conditions were only discussed briefly. I did note that Shyann does not have some of the other classic features of these disorders (ex known kidney cysts), and our suspicion is likely lower for them. However, given variable presentation and age of onset for these disorders, and in the absence of a complete work up for them, I am not able to rule these conditions out for Shyann and genetic testing still could be considered for them.    Therefore, I noted options of proceeding with a custom genetic testing panel of the two VEDS genes + main known familial intracranial aneurysm genes (many of which may not have other symptoms/features outside of aneurysm risk), vs a slightly more comprehensive cerebral aneurysm panel including VEDS, known familial intracranial aneurysms genes, and the genes for above syndromes associated with aneurysm risk and other features. After discussion of benefits and limitations of both (including chance of uncertain findings, likelihood of a positive result, management changes, etc.), Shyann elected to proceed with the latter panel as she was hoping to be more comprehensive, especially for her children. Specifically, this would be a custom panel of the following 26 genes, assuming [facility] is able to test all of the following: XLOTQZ38, ANGPTL6, EGZGWA28, COL1A1, COL3A1, DNAJB11, DZIP1L, FBN1, GANAB, HEY2, LOXL2, NFX1, NOTCH1, PCNT, PKD1, PKD2, PKHD1, OII031, SMAD2, SMAD3, SMAD4, TGFB2, TGFB3, TGFBR1, TGFBR2, and THSD1.     We discussed that the possible results for this genetic testing were:     Negative: meaning normal or no mutations are identified in the genes that were  tested/sequenced. This reduces, but does not completely rule out, a single genetic explanation for one's history.    Positive: meaning a mutation that is known to be associated with a particular set of symptoms is identified. This is usually associated with a specific genetic diagnosis. Shyann would be referred to a  for management discussion. Targeted testing for other family members would also then be available.    Variant of uncertain significance (VUS): meaning a change in the DNA sequence of a particular gene was seen but it is not known if it explains the symptoms. If a VUS is detected, the laboratory may request a blood sample from other family members to help clarify an individual's test results. Usually more research/evidence over time is needed to determine a VUS's significance.     We reviewed that if there is a genetic single-gene cause for Shyann's history, it can be important to know about. First and foremost, this can be important for Shyann's own health. If an underlying explanation for her history can be found, it may give more information about diagnosis and how to appropriate screen for and manage her. Some diagnoses have possible treatment options or require interventions. Secondly, it is possible an underlying cause may predispose Shyann to other health risks. Knowing about these additional health risks can help us stay ahead of her healthcare to more appropriately screen for and potentially prevent other complications. Thirdly, discovering an underlying reason may help predict the chance for the family to personally have or have a child with similar healthcare needs. Finally, having a specific underlying diagnosis can sometimes help individuals receive the services they need to help reach their full potential in school, in work, or in day to day life. Therefore, this testing is medically warranted for Shyann.     Limitations and risks of genetic testing were also discussed, including that  our genetic testing in 2022 is only as good as our current knowledge of genetics and genes. Therefore, a negative test result does not rule out a genetic condition and other follow up may be needed. The more genes that are tested for anyone, the higher the chance of finding VUS(s) is.     Latricia elected to proceed with genetic testing via the above 26 gene custom intracranial aneurysm panel at [facility] (assuming [facility] is able to test all of these genes; I will check this with them about this). Consent for genetic testing was obtained verbally. Shyann was still deciding about the opt in vs out for data being used for research. A copy of the consent form was sent by email to her, and she will decide about this opt in or out if/when genetic testing is initiated. Prior authorization will be attempted with insurance (Shyann did note she has met her deductible), which can take several weeks. Shyann will be called with this information from there. If comfortable with coverage information, blood draw will be coordinated at a [facility], and testing will be initiated from there. Results from there take up to 4-6 weeks. Once available, I will call the Shyann with results. Follow-up for Shyann and family will depend on her testing results.     Lab results may be automatically released via [employer].  Department protocol is to hold genetic testing results until we have reviewed them. We will then contact the family directly to disclose the results and ensure they receive a copy of the report. This protocol was reviewed with Shyann, who was in agreement to hold the results for genetics review and direct contact.        It was a pleasure to meet with Shyann virtually today. She had no additional questions at this time. Contact information was shared for any future questions or concerns that arise.     Plan:   1. Consent was obtained verbally for the above 26 gene custom intracranial aneurysm panel at [facility]. Prior authorization will be  attempted with insurance, which can take several weeks. Shyann will be called with this information from there. She will also decide about opting in or out for genetic data (de-identified) being used for research.  2. If comfortable with coverage information, blood draw will be coordinated at a [facility], and testing will be initiated from there.   3. Results from there will take up to 4-6 weeks, at which time I will call Shyann.  4. Follow up would be pending results.  5. Contact information was provided should any questions arise in the future.      Elvira Stewart MS, [facility]  Genetic Counselor  Division of Genetics and Metabolism  [facility]   Phone: [phone]  Pager: [phone]        CC: PCP; Dr. Barahona  Approx time spent on video: 40 minutes      References:  https://pubmed.ncbi.nlm.nih.gov/52873491/  https://medlineplus.gov/genetics/condition/karin-danlos-syndrome/  https://www.ahajournals.org/doi/10.1161/STROKEAHA.114.414133  https://pubmed.ncbi.nlm.nih.gov/25034453/  https://pubmed.ncbi.nlm.nih.gov/90604665/  https://www.[employer].Optiant/Article/Fulltext/739872  https://www.ncbi.nlm.nih.gov/pmc/articles/KRI5318701/  https://pubmed.ncbi.nlm.nih.gov/12543466/  https://pubmed.ncbi.nlm.nih.gov/36812103/  https://pubmed.ncbi.nlm.nih.gov/53834736/  https://pubmed.ncbi.nlm.nih.gov/62710060/  https://www.ncbi.nlm.nih.gov/pmc/articles/QDY5704053/  https://www.ncbi.nlm.nih.gov/pmc/articles/JPB5617670/  https://www.ncbi.nlm.nih.gov/pmc/articles/VKK5176817/  https://www.ncbi.nlm.nih.gov/pmc/articles/ADH0206292/  Https://www.ncbi.nlm.nih.gov/pmc/articles/EOA5700070/  https://www.ncbi.nlm.nih.gov/pmc/articles/AWH6426487/        Please do not hesitate to contact me if you have any questions/concerns.     Sincerely,       Cornelia Stewart GC

## 2022-09-09 NOTE — LETTER
2022      RE: Shyann Jurado  [address] [place] [id number]       Presenting information:   Shyann Jurado is a 47 year old female with a personal history of cerebral aneurysm and family history of aneurysms, possibly suggestive of familial aneurysm risk. She was referred for genetic counseling by Dr. Barahona (Genetics) after Dr. Barahona' previous appointment with Shyann's daughter Latricia, due to concerns for Shyann for vascular Reba-Danlos syndrome or a familial aneurysm condition. Shyann was seen virtually by video today at the [facility] to further discuss possible genetic contributions to Shyann's history and to obtain informed consent for genetic testing.     Personal History:   Shyann has a history of a cerebral aneurysm at 42 years, found on a MRI being done for her headaches. She is followed annually with brain imaging/angiogram. Shyann notes hypermobility and self reports some easy bruising and skin taking awhile to heal.     Shyann denied joint pain, dislocations, ruptured blood vessels or organs including the uterus, abnormal surgery recoveries, lung concerns/pneumothoraces, known kidney cysts (she notes a past ultrasound due to a kidney infection, though I do not have a copy of these records), heart concerns other than a murmur when born that resolved (last imaging reportedly ~10 years ago), and eye/vision concerns other than one pupil dilating sometimes (she does not have glasses or contacts). She is 5' 2.5''.    Additional history includes headaches starting in her early 40's, vitamin D deficiency, hypothyroidism, ovarian cysts, iron deficiency, reported malabsorption issues, and pelvic floor surgery for a tear after her daughter's birth (son was born via a ). She notes she has been told she needs lots of anesthesia for procedures. She also has a history of colon polyps, beginning at age 41 and monitored every other year via colonoscopy.      Family History:    A three generation pedigree was obtained today and sent to be scanned into the EMR. The family history was significant for the following:    Shyann's daughter Latricia, 16 years, has a history includes PANDAS, POTS, mast cell, dysautonomia, and COVID complications. See Dr. Barahona' note in Latricia's chart for full details; no genetic testing was recommended at that visit for Latricia. Shyann's son, 13 years, has a history of PANS/PANDAS at age 8-9 and he was diagnosed with Lyme disease last year.     Shyann has one brother, who has a history of ringing in his ears due to superior canal dehiscence. He has two children, one of whom has some GI issues.    Shyann's father is 74 years old. He has a history of melanoma and colon polyps.    Shyann's father has several siblings, including one sister who passed at 55 years from colon cancer. She also had some developmental delays with unknown exact diagnosis. Shyann reports she was fine when born and may have had iron deficiency. While it may not be possible to get more information given she has passed, if additional information about her diagnosis and/or if any other family members have similar history, this would be important to share for possible recurrence information for the family. Shyann notes no major health concerns for her paternal cousins.    Shyann's paternal grandmother passed from dementia. Shyann's paternal grandfather passed from colon cancer.    Shyann's mother is 75 years old. She was diagnosed with a brain aneurysm at 70, and has had acute pancreatitis (cause unknown). She also has a history of reported easy bruising, stretchy skin, migraines, thyroid disease with thyroid removal, and stomach issues with malabsorption.    Shyann's mother has 9 siblings. One brother  of a brain aneurysm, and one sister had an aortic aneurysm (full details unknown). Another aunt had a sudden onset of dementia in her early 60's. Of Shyann's maternal first cousins, one had leukemia and a  sibling of theirs had a cancerous brain tumor (children of Shyann's aunt with dementia).    Shyann's maternal grandmother passed from rectal cancer at an older age. She also had a history of reported stretchy skin, chronic GI/stomach issues, and thyroid issues. Shyann's maternal grandfather passed from colon cancer.    Shyann is of Thai, Syriac, and [place] ancestry. Consanguinity was denied.    The family history of cancer would be important to share with the family's PCPs for any recommended screening changes. Cancer genetic counseling may also be warranted for the family, especially if any of the colon cancers were diagnosed <50 years, for example starting with Shyann's father due to his history of colon polyps + family history of colon cancer. I will reiterate this when following up with Shyann next.     Discussion:   Genes are long stretches of DNA that are responsible for how our bodies look and how our bodies work. We all have two copies of every gene, one inherited from our mother and one inherited from our father. When there is a change, called a mutation, in a gene it can cause it to not do its job correctly which can cause the signs and symptoms of a genetic condition.  Genetic testing involves analyzing the relevant gene(s) for pathogenic genetic changes, with a goal of getting more information about if one's history is due to a single genetic cause or not.    Based on Shyann personal and family history, Dr. Barahona recommended genetic testing for her for various genes that can be related to brain aneurysms. Specifically, this would be a panel of genes from the [facility] ([facility]). These genes vary as far as if they are associated with brain aneurysms only or aneurysms + other findings/symptoms. Many of these genes follow an autosomal dominant inheritance pattern (one mutation in the gene is causative for the disorder/risk), though there are some variable  inheritance patterns. For example, mutations in a gene can be brand new in an individual, or inherited from one or both parent. If a mutation is identified in one of these genes, additional specific information about what it means for Shyann, as well as inheritance and risk to other family members, will need to be discussed specifically.    One genetic condition associated with increased chance for aneurysms is vascular reba-Danlos syndrome (VEDS). Reba-Danlos syndrome is a group of inherited disorders that affect connective tissues supporting the skin, bones, blood vessels, and many other organs and tissues. Defects in connective tissues cause the signs and symptoms of these conditions, which range from mildly loose joints to life-threatening complications. People who have Reba-Danlos syndrome usually have overly flexible joints and stretchy, fragile skin. A more severe form of the disorder, called vascular Reba-Danlos syndrome, can cause the walls of the blood vessels, intestines, or uterus to rupture. Additional symptoms can include easy bruising, abnormal scarring, and poor wound healing. Most cases of the vascular EDS type result from variants in the COL3A1 gene, although rarely this type is caused by certain COL1A1 gene variants. Shyann was aware that while genetic testing is available for VEDS, good genetic testing does not exist for some other EDS types, including hypermobile EDS, and diagnosis is therefore based on clinical evaluation for these other type(s).    There are also multiple syndromes associated with increased risk for aneurysm, including Loeys-Honorio syndrome, Marfan syndrome, and polycystic kidney disease. These conditions were only discussed briefly. I did note that Shyann does not have some of the other classic features of these disorders (ex known kidney cysts), and our suspicion is likely lower for them. However, given variable presentation and age of onset for these disorders, and in  the absence of a complete work up for them, I am not able to rule these conditions out for Shyann and genetic testing still could be considered for them.    Therefore, I noted options of proceeding with a custom genetic testing panel of the two VEDS genes + main known familial intracranial aneurysm genes (many of which may not have other symptoms/features outside of aneurysm risk), vs a slightly more comprehensive cerebral aneurysm panel including VEDS, known familial intracranial aneurysms genes, and the genes for above syndromes associated with aneurysm risk and other features. After discussion of benefits and limitations of both (including chance of uncertain findings, likelihood of a positive result, management changes, etc.), Shyann elected to proceed with the latter panel as she was hoping to be more comprehensive, especially for her children. Specifically, this would be a custom panel of the following 26 genes, assuming [facility] is able to test all of the following: RPRWQR78, ANGPTL6, XXRXZM66, COL1A1, COL3A1, DNAJB11, DZIP1L, FBN1, GANAB, HEY2, LOXL2, NFX1, NOTCH1, PCNT, PKD1, PKD2, PKHD1, ZBD157, SMAD2, SMAD3, SMAD4, TGFB2, TGFB3, TGFBR1, TGFBR2, and THSD1.     We discussed that the possible results for this genetic testing were:     Negative: meaning normal or no mutations are identified in the genes that were tested/sequenced. This reduces, but does not completely rule out, a single genetic explanation for one's history.    Positive: meaning a mutation that is known to be associated with a particular set of symptoms is identified. This is usually associated with a specific genetic diagnosis. Shyann would be referred to a  for management discussion. Targeted testing for other family members would also then be available.    Variant of uncertain significance (VUS): meaning a change in the DNA sequence of a particular gene was seen but it is not known if it explains the symptoms. If a VUS is detected,  the laboratory may request a blood sample from other family members to help clarify an individual's test results. Usually more research/evidence over time is needed to determine a VUS's significance.     We reviewed that if there is a genetic single-gene cause for Shyann's history, it can be important to know about. First and foremost, this can be important for Shyann's own health. If an underlying explanation for her history can be found, it may give more information about diagnosis and how to appropriate screen for and manage her. Some diagnoses have possible treatment options or require interventions. Secondly, it is possible an underlying cause may predispose Shyann to other health risks. Knowing about these additional health risks can help us stay ahead of her healthcare to more appropriately screen for and potentially prevent other complications. Thirdly, discovering an underlying reason may help predict the chance for the family to personally have or have a child with similar healthcare needs. Finally, having a specific underlying diagnosis can sometimes help individuals receive the services they need to help reach their full potential in school, in work, or in day to day life. Therefore, this testing is medically warranted for Shyann.     Limitations and risks of genetic testing were also discussed, including that our genetic testing in 2022 is only as good as our current knowledge of genetics and genes. Therefore, a negative test result does not rule out a genetic condition and other follow up may be needed. The more genes that are tested for anyone, the higher the chance of finding VUS(s) is.     Latricia elected to proceed with genetic testing via the above 26 gene custom intracranial aneurysm panel at [facility] (assuming [facility] is able to test all of these genes; I will check this with them about this). Consent for genetic testing was obtained verbally. Shyann was still deciding about the opt in vs out for data  being used for research. A copy of the consent form was sent by email to her, and she will decide about this opt in or out if/when genetic testing is initiated. Prior authorization will be attempted with insurance (Shyann did note she has met her deductible), which can take several weeks. Shyann will be called with this information from there. If comfortable with coverage information, blood draw will be coordinated at a [facility], and testing will be initiated from there. Results from there take up to 4-6 weeks. Once available, I will call the Shyann with results. Follow-up for Shyann and family will depend on her testing results.     Lab results may be automatically released via [facility].  Department protocol is to hold genetic testing results until we have reviewed them. We will then contact the family directly to disclose the results and ensure they receive a copy of the report. This protocol was reviewed with Shyann, who was in agreement to hold the results for genetics review and direct contact.        It was a pleasure to meet with Shyann virtually today. She had no additional questions at this time. Contact information was shared for any future questions or concerns that arise.     Plan:   1. Consent was obtained verbally for the above 26 gene custom intracranial aneurysm panel at [facility]. Prior authorization will be attempted with insurance, which can take several weeks. Shyann will be called with this information from there. She will also decide about opting in or out for genetic data (de-identified) being used for research.  2. If comfortable with coverage information, blood draw will be coordinated at a [facility], and testing will be initiated from there.   3. Results from there will take up to 4-6 weeks, at which time I will call Shyann.  4. Follow up would be pending results.  5. Contact information was provided should any questions arise in the future.      Elvira Stewart MS, [facility]  Genetic Counselor  Division of  Genetics and Metabolism  [facility]   Phone: [phone]  Pager: [phone]        CC: PCP; Dr. Jun Lazcano time spent on video: 40 minutes      References:  https://pubmed.ncbi.nlm.nih.gov/98577953/  https://medlineplus.gov/genetics/condition/karin-danlos-syndrome/  https://www.ahajournals.org/doi/10.1161/STROKEAHA.114.650655  https://pubmed.ncbi.nlm.nih.gov/53092263/  https://pubmed.ncbi.nlm.nih.gov/74395558/  https://www.avocadostore.Bromium/Article/Fulltext/253646  https://www.ncbi.nlm.nih.gov/pmc/articles/ZDQ8323669/  https://pubmed.ncbi.nlm.nih.gov/54420868/  https://pubmed.ncbi.nlm.nih.gov/93100403/  https://pubmed.ncbi.nlm.nih.gov/05448325/  https://pubmed.ncbi.nlm.nih.gov/77278376/  https://www.ncbi.nlm.nih.gov/pmc/articles/QNA5269807/  https://www.ncbi.nlm.nih.gov/pmc/articles/FTX6377900/  https://www.ncbi.nlm.nih.gov/pmc/articles/ZGV8842455/  https://www.ncbi.nlm.nih.gov/pmc/articles/WXM5121672/  Https://www.ncbi.nlm.nih.gov/pmc/articles/BEM6060735/  https://www.ncbi.nlm.nih.gov/pmc/articles/NZC5675712/        Cornelia Stewart GC

## 2022-09-28 NOTE — PROGRESS NOTES
Presenting information:   Shyann Jurado is a 47 year old female with a personal history of cerebral aneurysm and family history of aneurysms, possibly suggestive of familial aneurysm risk. She was referred for genetic counseling by Dr. Barahona (Genetics) after Dr. Barahona' previous appointment with Shyann's daughter Latricia, due to concerns for Shyann for vascular Reba-Danlos syndrome or a familial aneurysm condition. Shyann was seen virtually by video today at the [facility] to further discuss possible genetic contributions to Shyann's history and to obtain informed consent for genetic testing.     Personal History:   Shyann has a history of a cerebral aneurysm at 42 years, found on a MRI being done for her headaches. She is followed annually with brain imaging/angiogram. Shyann notes hypermobility and self reports some easy bruising and skin taking awhile to heal.     Shyann denied joint pain, dislocations, ruptured blood vessels or organs including the uterus, abnormal surgery recoveries, lung concerns/pneumothoraces, known kidney cysts (she notes a past ultrasound due to a kidney infection, though I do not have a copy of these records), heart concerns other than a murmur when born that resolved (last imaging reportedly ~10 years ago), and eye/vision concerns other than one pupil dilating sometimes (she does not have glasses or contacts). She is 5' 2.5''.    Additional history includes headaches starting in her early 40's, vitamin D deficiency, hypothyroidism, ovarian cysts, iron deficiency, reported malabsorption issues, and pelvic floor surgery for a tear after her daughter's birth (son was born via a ). She notes she has been told she needs lots of anesthesia for procedures. She also has a history of colon polyps, beginning at age 41 and monitored every other year via colonoscopy.      Family History:   A three generation pedigree was obtained today and sent to be scanned into  the EMR. The family history was significant for the following:    Shyann's daughter Latricia, 16 years, has a history includes PANDAS, POTS, mast cell, dysautonomia, and COVID complications. See Dr. Barahona' note in Latricia's chart for full details; no genetic testing was recommended at that visit for Latricia. Shyann's son, 13 years, has a history of PANS/PANDAS at age 8-9 and he was diagnosed with Lyme disease last year.     Shyann has one brother, who has a history of ringing in his ears due to superior canal dehiscence. He has two children, one of whom has some GI issues.    Shyann's father is 74 years old. He has a history of melanoma and colon polyps.    Shyann's father has several siblings, including one sister who passed at 55 years from colon cancer. She also had some developmental delays with unknown exact diagnosis. Shyann reports she was fine when born and may have had iron deficiency. While it may not be possible to get more information given she has passed, if additional information about her diagnosis and/or if any other family members have similar history, this would be important to share for possible recurrence information for the family. Shyann notes no major health concerns for her paternal cousins.    Shyann's paternal grandmother passed from dementia. Shyann's paternal grandfather passed from colon cancer.    Shyann's mother is 75 years old. She was diagnosed with a brain aneurysm at 70, and has had acute pancreatitis (cause unknown). She also has a history of reported easy bruising, stretchy skin, migraines, thyroid disease with thyroid removal, and stomach issues with malabsorption.    Shyann's mother has 9 siblings. One brother  of a brain aneurysm, and one sister had an aortic aneurysm (full details unknown). Another aunt had a sudden onset of dementia in her early 60's. Of Shyann's maternal first cousins, one had leukemia and a sibling of theirs had a cancerous brain tumor (children of Shyann's aunt with  dementia).    Shyann's maternal grandmother passed from rectal cancer at an older age. She also had a history of reported stretchy skin, chronic GI/stomach issues, and thyroid issues. Shyann's maternal grandfather passed from colon cancer.    Shyann is of Portuguese, [place], and [place] ancestry. Consanguinity was denied.    The family history of cancer would be important to share with the family's PCPs for any recommended screening changes. Cancer genetic counseling may also be warranted for the family, especially if any of the colon cancers were diagnosed <50 years, for example starting with Shyann's father due to his history of colon polyps + family history of colon cancer. I will reiterate this when following up with Shyann next.     Discussion:   Genes are long stretches of DNA that are responsible for how our bodies look and how our bodies work. We all have two copies of every gene, one inherited from our mother and one inherited from our father. When there is a change, called a mutation, in a gene it can cause it to not do its job correctly which can cause the signs and symptoms of a genetic condition.  Genetic testing involves analyzing the relevant gene(s) for pathogenic genetic changes, with a goal of getting more information about if one's history is due to a single genetic cause or not.    Based on Shyann personal and family history, Dr. Barahona recommended genetic testing for her for various genes that can be related to brain aneurysms. Specifically, this would be a panel of genes from the [facility] ([facility]). These genes vary as far as if they are associated with brain aneurysms only or aneurysms + other findings/symptoms. Many of these genes follow an autosomal dominant inheritance pattern (one mutation in the gene is causative for the disorder/risk), though there are some variable inheritance patterns. For example, mutations in a gene can be brand new in an  individual, or inherited from one or both parent. If a mutation is identified in one of these genes, additional specific information about what it means for Shyann, as well as inheritance and risk to other family members, will need to be discussed specifically.    One genetic condition associated with increased chance for aneurysms is vascular reba-Danlos syndrome (VEDS). Reba-Danlos syndrome is a group of inherited disorders that affect connective tissues supporting the skin, bones, blood vessels, and many other organs and tissues. Defects in connective tissues cause the signs and symptoms of these conditions, which range from mildly loose joints to life-threatening complications. People who have Reba-Danlos syndrome usually have overly flexible joints and stretchy, fragile skin. A more severe form of the disorder, called vascular Reba-Danlos syndrome, can cause the walls of the blood vessels, intestines, or uterus to rupture. Additional symptoms can include easy bruising, abnormal scarring, and poor wound healing. Most cases of the vascular EDS type result from variants in the COL3A1 gene, although rarely this type is caused by certain COL1A1 gene variants. Shyann was aware that while genetic testing is available for VEDS, good genetic testing does not exist for some other EDS types, including hypermobile EDS, and diagnosis is therefore based on clinical evaluation for these other type(s).    There are also multiple syndromes associated with increased risk for aneurysm, including Loeys-Honorio syndrome, Marfan syndrome, and polycystic kidney disease. These conditions were only discussed briefly. I did note that Shyann does not have some of the other classic features of these disorders (ex known kidney cysts), and our suspicion is likely lower for them. However, given variable presentation and age of onset for these disorders, and in the absence of a complete work up for them, I am not able to rule these  conditions out for Shyann and genetic testing still could be considered for them.    Therefore, I noted options of proceeding with a custom genetic testing panel of the two VEDS genes + main known familial intracranial aneurysm genes (many of which may not have other symptoms/features outside of aneurysm risk), vs a slightly more comprehensive cerebral aneurysm panel including VEDS, known familial intracranial aneurysms genes, and the genes for above syndromes associated with aneurysm risk and other features. After discussion of benefits and limitations of both (including chance of uncertain findings, likelihood of a positive result, management changes, etc.), Shyann elected to proceed with the latter panel as she was hoping to be more comprehensive, especially for her children. Specifically, this would be a custom panel of the following 26 genes, assuming [facility] is able to test all of the following: XCYMCG86, ANGPTL6, MNRMFA15, COL1A1, COL3A1, DNAJB11, DZIP1L, FBN1, GANAB, HEY2, LOXL2, NFX1, NOTCH1, PCNT, PKD1, PKD2, PKHD1, BYD628, SMAD2, SMAD3, SMAD4, TGFB2, TGFB3, TGFBR1, TGFBR2, and THSD1.     We discussed that the possible results for this genetic testing were:     Negative: meaning normal or no mutations are identified in the genes that were tested/sequenced. This reduces, but does not completely rule out, a single genetic explanation for one's history.    Positive: meaning a mutation that is known to be associated with a particular set of symptoms is identified. This is usually associated with a specific genetic diagnosis. Shyann would be referred to a  for management discussion. Targeted testing for other family members would also then be available.    Variant of uncertain significance (VUS): meaning a change in the DNA sequence of a particular gene was seen but it is not known if it explains the symptoms. If a VUS is detected, the laboratory may request a blood sample from other family members to  help clarify an individual's test results. Usually more research/evidence over time is needed to determine a VUS's significance.     We reviewed that if there is a genetic single-gene cause for Shyann's history, it can be important to know about. First and foremost, this can be important for Shyann's own health. If an underlying explanation for her history can be found, it may give more information about diagnosis and how to appropriate screen for and manage her. Some diagnoses have possible treatment options or require interventions. Secondly, it is possible an underlying cause may predispose Shyann to other health risks. Knowing about these additional health risks can help us stay ahead of her healthcare to more appropriately screen for and potentially prevent other complications. Thirdly, discovering an underlying reason may help predict the chance for the family to personally have or have a child with similar healthcare needs. Finally, having a specific underlying diagnosis can sometimes help individuals receive the services they need to help reach their full potential in school, in work, or in day to day life. Therefore, this testing is medically warranted for Shyann.     Limitations and risks of genetic testing were also discussed, including that our genetic testing in 2022 is only as good as our current knowledge of genetics and genes. Therefore, a negative test result does not rule out a genetic condition and other follow up may be needed. The more genes that are tested for anyone, the higher the chance of finding VUS(s) is.     Shyann elected to proceed with genetic testing via the above 26 gene custom intracranial aneurysm panel at [facility] (assuming [facility] is able to test all of these genes; I will check this with them about this). Consent for genetic testing was obtained verbally. Shyann was still deciding about the opt in vs out for data being used for research. A copy of the consent form was sent by email to  her, and she will decide about this opt in or out if/when genetic testing is initiated. Prior authorization will be attempted with insurance (Shyann did note she has met her deductible), which can take several weeks. Shyann will be called with this information from there. If comfortable with coverage information, blood draw will be coordinated at a [place], and testing will be initiated from there. Results from there take up to 4-6 weeks. Once available, I will call the Shyann with results. Follow-up for Shyann and family will depend on her testing results.     Lab results may be automatically released via [employer].  Department protocol is to hold genetic testing results until we have reviewed them. We will then contact the family directly to disclose the results and ensure they receive a copy of the report. This protocol was reviewed with Shyann, who was in agreement to hold the results for genetics review and direct contact.        It was a pleasure to meet with Shyann virtually today. She had no additional questions at this time. Contact information was shared for any future questions or concerns that arise.     Plan:   1. Consent was obtained verbally for the above 26 gene custom intracranial aneurysm panel at [facility]. Prior authorization will be attempted with insurance, which can take several weeks. Shyann will be called with this information from there. She will also decide about opting in or out for genetic data (de-identified) being used for research.  2. If comfortable with coverage information, blood draw will be coordinated at a [place], and testing will be initiated from there.   3. Results from there will take up to 4-6 weeks, at which time I will call Shyann.  4. Follow up would be pending results.  5. Contact information was provided should any questions arise in the future.      Elvira Stewart MS, [facility]  Genetic Counselor  Division of Genetics and Metabolism  [facility]    Phone: [phone]  Pager: [phone]        CC: PCP; Dr. Barahona  Approx time spent on video: 40 minutes      References:  https://pubmed.ncbi.nlm.nih.gov/01894594/  https://medlineplus.gov/genetics/condition/karin-danlos-syndrome/  https://www.ahajournals.org/doi/10.1161/STROKEAHA.114.563887  https://pubmed.ncbi.nlm.nih.gov/63708816/  https://pubmed.ncbi.nlm.nih.gov/24470784/  https://www.[employer]/Article/Fulltext/801035  https://www.ncbi.nlm.nih.gov/pmc/articles/UTC5318016/  https://pubmed.ncbi.nlm.nih.gov/74115302/  https://pubmed.ncbi.nlm.nih.gov/43899937/  https://pubmed.ncbi.nlm.nih.gov/20759155/  https://pubmed.ncbi.nlm.nih.gov/04165806/  https://www.ncbi.nlm.nih.gov/pmc/articles/XSF7465293/  https://www.ncbi.nlm.nih.gov/pmc/articles/FCZ1231043/  https://www.ncbi.nlm.nih.gov/pmc/articles/LFK1519806/  https://www.ncbi.nlm.nih.gov/pmc/articles/PEC3120706/  Https://www.ncbi.nlm.nih.gov/pmc/articles/KAB7003846/  https://www.ncbi.nlm.nih.gov/pmc/articles/LYK9889125/

## 2022-10-02 DIAGNOSIS — Z30.011 INITIATION OF OCP (BCP): ICD-10-CM

## 2022-10-03 RX ORDER — DROSPIRENONE AND ETHINYL ESTRADIOL 0.03MG-3MG
KIT ORAL
Qty: 84 TABLET | Refills: 3 | OUTPATIENT
Start: 2022-10-03

## 2022-10-12 ENCOUNTER — TELEPHONE (OUTPATIENT)
Dept: LAB | Facility: CLINIC | Age: 47
End: 2022-10-12

## 2022-10-12 NOTE — PROGRESS NOTES
Notified Shyann that we received prior authorization approval for her genetic testing.     Explained that insurance benefits may still apply, therefore, there could be an out of pocket cost.Provided Shyann with estimated out of pocket cost for testing.    Shyann expressed understanding and stated that she wants to proceed with testing. Shyann will call to schedule a blood draw. [facility] Hold For Preauthorization: [[id number]] order was placed by a genetics provider.    LUCIANO Yeager  Genomics Billing    [facility]   Molecular Diagnostics Laboratory  [address] [place] [id number]  [phone]

## 2022-10-14 ENCOUNTER — LAB (OUTPATIENT)
Dept: LAB | Facility: CLINIC | Age: 47
End: 2022-10-14
Payer: COMMERCIAL

## 2022-10-14 DIAGNOSIS — I67.1 CEREBRAL ANEURYSM: ICD-10-CM

## 2022-10-14 DIAGNOSIS — Z82.49 FAMILY HISTORY OF CEREBRAL ANEURYSM: ICD-10-CM

## 2022-10-14 DIAGNOSIS — G43.709 CHRONIC MIGRAINE WITHOUT AURA: ICD-10-CM

## 2022-10-14 DIAGNOSIS — Z82.49 FAMILY HISTORY OF AORTIC ANEURYSM: ICD-10-CM

## 2022-10-14 DIAGNOSIS — M24.9 HYPERMOBILITY OF JOINT: ICD-10-CM

## 2022-10-14 DIAGNOSIS — D50.8 OTHER IRON DEFICIENCY ANEMIA: ICD-10-CM

## 2022-10-14 LAB
INTERPRETATION: NORMAL
LAB PDF RESULT: NORMAL
SIGNIFICANT RESULTS: NORMAL
SPECIMEN DESCRIPTION: NORMAL
TEST DETAILS, MDL: NORMAL

## 2022-10-14 PROCEDURE — 36415 COLL VENOUS BLD VENIPUNCTURE: CPT

## 2022-10-17 ENCOUNTER — TELEPHONE (OUTPATIENT)
Dept: NEUROLOGY | Facility: CLINIC | Age: 47
End: 2022-10-17

## 2022-10-17 NOTE — TELEPHONE ENCOUNTER
PA Initiation    Medication: galcanezumab-gnlm (EMGALITY) 120 MG/ML injection  Insurance Company: [employer] ([facility]) - Phone [phone] Fax [phone]  Pharmacy Filling the Rx: [facility][id number] [address]  AT [facility] OF [address]   Filling Pharmacy Phone: [phone]  Filling Pharmacy Fax: [phone]  Start Date: 10/17/2022

## 2022-10-17 NOTE — TELEPHONE ENCOUNTER
Prior Authorization Approval    Authorization Effective Date: 10/17/2022  Authorization Expiration Date: 10/17/2023  Medication: galcanezumab-gnlm (EMGALITY) 120 MG/ML injection  Approved Dose/Quantity:   Reference #: [id number]   Insurance Company: [employer] ([facility]) - Phone [phone] Fax [phone]  Which Pharmacy is filling the prescription (Not needed for infusion/clinic administered): [id number] [address]  AT [facility]  Pharmacy Notified: Yes  Patient Notified: Yes, Sent patient a [employer] message regarding approval.

## 2022-10-17 NOTE — TELEPHONE ENCOUNTER
PA team please see below message regarding urgent PA needed for Emgality.     Informed pt that PA was being worked on.    Ted Marie, RN, BSN  [facility]

## 2022-10-19 LAB — INTERPRETATION: NORMAL

## 2022-11-02 ENCOUNTER — LAB (OUTPATIENT)
Dept: LAB | Facility: CLINIC | Age: 47
End: 2022-11-02
Payer: COMMERCIAL

## 2022-11-02 DIAGNOSIS — Z82.49 FAMILY HISTORY OF CEREBRAL ANEURYSM: ICD-10-CM

## 2022-11-02 DIAGNOSIS — I67.1 CEREBRAL ANEURYSM: Primary | ICD-10-CM

## 2022-11-02 DIAGNOSIS — Z82.49 FAMILY HISTORY OF AORTIC ANEURYSM: ICD-10-CM

## 2022-11-02 DIAGNOSIS — G43.709 CHRONIC MIGRAINE W/O AURA W/O STATUS MIGRAINOSUS, NOT INTRACTABLE: ICD-10-CM

## 2022-11-02 DIAGNOSIS — D50.8 OTHER IRON DEFICIENCY ANEMIAS: ICD-10-CM

## 2022-11-02 DIAGNOSIS — M24.9 HYPERMOBILITY OF JOINT: ICD-10-CM

## 2022-11-02 PROCEDURE — 81405 MOPATH PROCEDURE LEVEL 6: CPT | Performed by: MEDICAL GENETICS

## 2022-11-02 PROCEDURE — 81407 MOPATH PROCEDURE LEVEL 8: CPT | Performed by: MEDICAL GENETICS

## 2022-11-02 PROCEDURE — 81408 MOPATH PROCEDURE LEVEL 9: CPT | Performed by: MEDICAL GENETICS

## 2022-11-02 PROCEDURE — 81406 MOPATH PROCEDURE LEVEL 7: CPT | Performed by: MEDICAL GENETICS

## 2022-11-02 PROCEDURE — 81479 UNLISTED MOLECULAR PATHOLOGY: CPT | Performed by: MEDICAL GENETICS

## 2022-11-04 PROCEDURE — G0452 MOLECULAR PATHOLOGY INTERPR: HCPCS | Mod: 26 | Performed by: PATHOLOGY

## 2023-01-07 DIAGNOSIS — G43.719 INTRACTABLE CHRONIC MIGRAINE WITHOUT AURA AND WITHOUT STATUS MIGRAINOSUS: ICD-10-CM

## 2023-01-08 ENCOUNTER — HEALTH MAINTENANCE LETTER (OUTPATIENT)
Age: 48
End: 2023-01-08

## 2023-01-10 ENCOUNTER — TELEPHONE (OUTPATIENT)
Dept: NEUROLOGY | Facility: CLINIC | Age: 48
End: 2023-01-10
Payer: COMMERCIAL

## 2023-01-10 DIAGNOSIS — G43.719 INTRACTABLE CHRONIC MIGRAINE WITHOUT AURA AND WITHOUT STATUS MIGRAINOSUS: Primary | ICD-10-CM

## 2023-01-10 RX ORDER — CEFUROXIME AXETIL 250 MG/1
6 TABLET ORAL
Qty: 1 KIT | Refills: 1 | Status: SHIPPED | OUTPATIENT
Start: 2023-01-10 | End: 2023-04-04

## 2023-01-10 NOTE — TELEPHONE ENCOUNTER
Dr. Taylor,     Pt reports that migraines are not controlled with emgality and that medication is costly. She is wanting to restart botox. She did botox in 2021.     Please advise, would appt with pt be next step to discuss further? Last seen 7/2022    Ted Marie RN, BSN  [facility]

## 2023-01-10 NOTE — TELEPHONE ENCOUNTER
Refill request for: Sumatriptan Pen      LOV: 7/26/22  NOV: 7/18/23    30 day supply with  refills Medication T'd for review and signature

## 2023-01-10 NOTE — TELEPHONE ENCOUNTER
[facility] Call Center    Phone Message    May a detailed message be left on voicemail: yes     Reason for Call: Other: Pt reports Emgality is not really helping alfredo that much for the price.  She states she was on botox before and Dr. Taylor said she may need to jump through some hoops to get approved for botox under new insurance.    Please call pt back at [phone] to discuss what needs to happen to proceed with botox.    Action Taken: Message routed to:  Other: [facility]    Travel Screening: Not Applicable

## 2023-01-11 NOTE — TELEPHONE ENCOUNTER
Can you find a 40 min apt for clinic visit and Botox on same day.    L sided facial droop is established normal.   Per EMS- wife stated that he typically has a hard time breathing when they come up to [place] due to the elevation however this time was much more exaggerated.

## 2023-01-12 NOTE — TELEPHONE ENCOUNTER
I cannot schedule patient for Botox yet as we do not have an order/approval. Can you order botox so we can get the PA done before scheduling.

## 2023-01-19 NOTE — TELEPHONE ENCOUNTER
[facility] Call Center    Phone Message    May a detailed message be left on voicemail: yes     Reason for Call: Other: Pt is calling back to confirm that PA has been approved before she sets up the Botox appt. Pt would like a call  back to verify this first, then proceed with scheduling.      Action Taken: Message routed to:  Other: [facility]    Travel Screening: Not Applicable

## 2023-01-31 ENCOUNTER — OFFICE VISIT (OUTPATIENT)
Dept: NEUROLOGY | Facility: CLINIC | Age: 48
End: 2023-01-31
Payer: COMMERCIAL

## 2023-01-31 VITALS
BODY MASS INDEX: 26.22 KG/M2 | SYSTOLIC BLOOD PRESSURE: 105 MMHG | DIASTOLIC BLOOD PRESSURE: 60 MMHG | HEIGHT: 63 IN | HEART RATE: 81 BPM | WEIGHT: 148 LBS

## 2023-01-31 VITALS
SYSTOLIC BLOOD PRESSURE: 105 MMHG | HEIGHT: 63 IN | BODY MASS INDEX: 26.22 KG/M2 | DIASTOLIC BLOOD PRESSURE: 60 MMHG | HEART RATE: 81 BPM | WEIGHT: 148 LBS

## 2023-01-31 DIAGNOSIS — I67.1 CEREBRAL ANEURYSM, NONRUPTURED: ICD-10-CM

## 2023-01-31 DIAGNOSIS — G43.719 INTRACTABLE CHRONIC MIGRAINE WITHOUT AURA AND WITHOUT STATUS MIGRAINOSUS: Primary | ICD-10-CM

## 2023-01-31 PROCEDURE — 99214 OFFICE O/P EST MOD 30 MIN: CPT | Mod: 25 | Performed by: PSYCHIATRY & NEUROLOGY

## 2023-01-31 PROCEDURE — 64615 CHEMODENERV MUSC MIGRAINE: CPT | Performed by: PSYCHIATRY & NEUROLOGY

## 2023-01-31 NOTE — NURSING NOTE
Chief Complaint   Patient presents with     Botox     Pt restarting Botox today.     Radha Khan LPN on 1/31/2023 at 11:17 AM

## 2023-01-31 NOTE — PROGRESS NOTES
NEUROLOGY OUTPATIENT PROGRESS NOTE   Jan 31, 2023     CHIEF COMPLAINT/REASON FOR VISIT/REASON FOR CONSULT  Patient presents with:  Med check: Pt is restarting Botox today, hasn't had in over a year due to insurance change. She is having headaches about once every 1-2 weeks    REASON FOR CONSULTATION- Headaches    HISTORY OF PRESENT ILLNESS  Shyann Jurado is a 47 year old female seen today for headaches. She reports that her headache started about 2-1/2 years ago. They have always been sporadic once or twice a month. No clear triggers for the headaches have been identified. To be related to birth control and she was switched from Irma to Sarah. This did not significantly help the headaches. There is some neck tension involved which could be leading to the headaches. Headaches generally are behind the right eye with some sharp pain photophobia and phonophobia. She does have a history of a cerebral aneurysm identified on the MRI done for headaches. Headaches are thought to be unrelated to the aneurysm. She is currently followed by Dr. Álvarez who plans to do yearly MRI brain to evaluate for aneurysms. Imitrex 50 mg has been beneficial in the past. She is also been using Flexeril intermittently. There is some history of iron deficiency. She has difficulty sleeping and uses melatonin and Unisom. Tizanidine did not provide any benefit and patient was switched to Flexeril. She has had some hair loss side effects with the Topamax. Propanolol tried last time could not be tolerated because of side effects and she stopped the medication. Imitrex injections have also been prescribed for abortive therapy though she mainly uses the oral tablets.    Patient returns today reports that she is using 1 tablet of Flexeril every night. Headaches have significantly improved and she only has one headache in the last 6 weeks since I saw her. She used a combination of propanolol Flexeril Imitrex and caffeine as abortive therapy which  worked well for her. Has gained some weight on the propanolol. Currently is off the propanolol on every day basis. Reports that her headache was at the time of the menstrual cycle. She is planning on seeing her OB/GYN may be change of birth control. Complains of some spotting. She is sleeping better with the Flexeril.    10/4/20  Patient returns today.  She reports that in the summer months she did not have any headaches no recently headaches have come back.  Headaches unchanged in nature.  The headache is still behind the right eye.  She wonders if the headaches might be related to her cycle being a bit off though is not completely sure what caused it.  There was some stress involved because her daughter tested positive for COVID though that should have made the headaches worse during summertime.  She did have one headache in September this started with right-sided neck tension and was slightly more severe than her baseline headaches.  She is using propanolol Flexeril Imitrex and caffeine for abortive therapy.  This combination seems to work for her.  Is not taking any preventive medication at this point.  Is using Flexeril to help with sleep.  Has rarely (about 2 times) needed to use the Imitrex injection.    She did receive a diagnostic angiogram and her aneurysm is stable.  This plans to do another angiogram in 1 year.    11/23/20  Patient returns today.  Reports that for the last 2 weeks she has been having a continuous headache.  She was prescribed nortriptyline when she had called the clinic which she has not tried so far.  She does not want to be on medications.  Is interested in getting the Botox done today.  She also gets cosmetic Botox for her forehead.  She reports this was a month ago when she got 10 units.  She thinks her some of her headaches might be hormonal in nature.  She continues to use [place] and Imitrex for abortive therapy.  We talked about her being premenopause.  She is going to stop her  birth control.  She is thinking about hysterectomy.  Her mother needed to get hysterectomy done.  We talked about that the migraines might get worse stopping the birth control or after menopause.  She does not need to get the hysterectomy just because of the headaches.  There is no clear evidence that the hysterectomy will help the headaches.  Headaches otherwise unchanged nature.  She has a few bad headaches since she was last seen.    She is sleeping well at this point with the Flexeril at nighttime.  No aneurysm symptoms.    2/24/21  Patient returns today.  Reports that she is having 2 headache days a month with the Botox.  Denies any side effects.  Feels that her neck is less tight with the Botox.  She has stopped the propanolol.  She is no longer using the Imitrex oral tablets.  She still needs to use injections and has to use that twice.  Nurtec has been working well for abortive therapy.  Reports no aneurysm-like symptoms  Scheduled for bilateral blepharoplasty surgery next week.  Does not want us injecting in the forehead.  Is using her cyclobenzaprine at night and on as-needed basis.  Has talked to her OB/GYN and feels that hysterectomy will make things worse and that is on hold for right now.    4/20/21  Patient returns today.  She reports one bad migraine and 3 minor headaches in the last 2 months.  Overall Botox has been working really well.  Reports no side effects.  She has had a blepharoplasty surgery reports some scalp numbness behind her hairline.  Nurtec works really well for abortive therapy.  Imitrex injections occasionally she will have to use for more severe headaches.  Reports no aneurysm symptoms.  Has MRI scheduled for May.  Flexeril has been used as needed.  She stopped the propanolol.  Denies any other hormonal issues.    3/29/22  Patient returns today.  She was seen last year.  Has not done the Botox since then.  Headaches have gotten worse again.  She is having 9 days a month.   Previously used to be behind the right temple/eye and those headaches have not come back.  Exam more in the back of the head radiating up the neck.  There is associated photophobia and phonophobia.  She does use Flexeril as needed.  Is using Imitrex injections and tablets though that does make her sleepy.  Nurtec was working previously but is no longer that helpful.  She is interested in trying Emgality.  Is off the propanolol at this point.  Remains on the Irma birth control.  Does not think this is causing her headaches    7/26/22  Patient returns today  1.  She has been on Emgality for 3 months now.  Reports 1-2 headaches a month.  Generally uses Nurtec but if the headache is more severe needs to use the Imitrex.  We will use 100 mg of Imitrex (50x2).  Rarely needs to use the Imitrex injection.  2.  Is no longer needing to use the propanolol.  For neck pain she is intermittently using the Flexeril.  Uses it about 4 times a week.  3.  Aneurysm is stable on the MRI.  Wants to transition care to me since her neurosurgeon is no longer with [facility].  4.  Remains on the aspirin to control  5.  Is off the propanolol at this point.  6.  Sleeping well at night.    1/31/23  Patient returns for  1.  Reports that she has been doing the Emgality though it has not really been helping.  Will have headaches on Monday mornings.  Has been more active over the weekends which is possibly causing the headaches.  2.  For abortive therapy she is mainly using the Imitrex but is no longer using the propanolol or caffeine.  Does use Flexeril about 2 times a week to help her sleep as well as for neck issues.  3.  Wants to restart the Botox today with her new insurance  4.  No new aneurysm symptoms.  Needs an MRI this year.  Remains on aspirin.  No new concerns/issues    Previous history is reviewed and this is unchanged.    PAST MEDICAL/SURGICAL HISTORY  Past Medical History:   Diagnosis Date     Anemia      Brain aneurysm      Chronic  headaches      Colon polyps      Fatigue      Iron deficiency anemia      Ptosis of eyelid, bilateral      Vitamin D deficiency      Patient Active Problem List   Diagnosis     Vitamin D deficiency     Iron deficiency anemia     Insomnia     Hypothyroidism     History of colonic polyps     Fatigue     Chronic migraine     Brain aneurysm   Significant for high cholesterol, migraine headaches, depression, iron deficiency      FAMILY HISTORY  Family History   Problem Relation Age of Onset     Migraines Mother      Gallbladder Disease Maternal Aunt 75.00     Colon Cancer Paternal Aunt 50.00     Kidney Cancer Maternal Grandmother 70.00     Colon Cancer Maternal Grandfather 84.00     Prostate Cancer Paternal Grandfather 80.00     Kidney Cancer Paternal Grandfather      Colon Cancer Paternal Grandfather 78.00     Melanoma Paternal Aunt      Aneurysm Paternal Aunt         Brain     Melanoma Paternal Aunt 48.00     Aneurysm Mother      Melanoma Father      Other - See Comments Daughter         PANDAS     Hashimoto's thyroiditis Daughter      Other - See Comments Son         PANDAS     Aneurysm Maternal Uncle         Brain   Reviewed and negative for neurological conditions    SOCIAL HISTORY  Social History     Tobacco Use     Smoking status: Never     Smokeless tobacco: Never   Substance Use Topics     Alcohol use: Not Currently     Drug use: Never       SYSTEMS REVIEW  Twelve-system ROS was done and other than the HPI this was negative.   No new concerns    MEDICATIONS  acetaminophen (TYLENOL) 500 MG tablet, Take 500 mg by mouth every 6 hours as needed for mild pain  doxylamine (UNISOM) 25 MG TABS tablet, Take 25 mg by mouth nightly as needed  drospirenone-ethinyl estradiol (ADRIÁN) 3-0.03 MG tablet, 4 packs for 3 months, uses active birth control pill for 12 weeks and off for 1 week.  ibuprofen (ADVIL/MOTRIN) 800 MG tablet, Take 800 mg by mouth every 8 hours as needed for moderate pain  melatonin 3 MG tablet, Take 1 mg by  "mouth nightly as needed for sleep  Rimegepant Sulfate (NURTEC) 75 MG TBDP, Take 1 tablet by mouth as needed (Migraine)  SUMAtriptan (IMITREX STATDOSE) 6 MG/0.5ML pen injector kit, Inject 0.5 mLs (6 mg) Subcutaneous at onset of headache for migraine  SUMAtriptan (IMITREX) 100 MG tablet, Take 1 tablet (100 mg) by mouth at onset of headache for migraine Can repeat in 2 hours if needed  vitamin D2 (ERGOCALCIFEROL) 91453 units (1250 mcg) capsule, TAKE 1 CAPSULE BY MOUTH WEEKLY  galcanezumab-gnlm (EMGALITY) 120 MG/ML injection, Inject 1 mL (120 mg) Subcutaneous every 28 days (Patient not taking: Reported on 1/31/2023)  tretinoin (RETIN-A) 0.1 % external cream, APPLY PEA SIZED AMOUNT TOPICALLY TO THE AFFECTED AREA EVERY DAY AT BEDTIME AS DIRECTED (Patient not taking: Reported on 1/31/2023)    Botulinum Toxin Type A (BOTOX) 200 units injection 155 Units  Botulinum Toxin Type A (BOTOX) 200 units injection 200 Units       PHYSICAL EXAMINATION  VITALS: /60 (BP Location: Right arm, Patient Position: Sitting)   Pulse 81   Ht 1.594 m (5' 2.75\")   Wt 67.1 kg (148 lb)   BMI 26.43 kg/m    GENERAL: Healthy appearing, alert, no acute distress, normal habitus.  CARDIOVASCULAR: Ext warm and well perfused. Pulses present.   NEUROLOGICAL: Patient is awake and oriented to self, place and time. Attention span is normal. Memory grossly intact. Language is fluent and follows commands appropriately. Appropriate fund of knowledge. Cranial nerves 2-12 are intact. There is no pronator drift. Motor exam shows 5/5 strength in all extremities. Tone is symmetric bilaterally in upper and lower extremities. Finger to nose is without dysmetria. Gait is normal and the patient is able to do tandem walk.  Exam similar to before.    DIAGNOSTICS  DSA  Conically-shaped right internal carotid artery superior hypophyseal   aneurysm measures 1.3 mm deep by 1.7 mm x 2.1 mm across. The aneurysm neck   measures up to 2.1 mm in maximum dimension. There is " very mild fusiform   ectasia of the paraclinoid segment   giving rise to this more focal saccular aneurysm.    DSA 2020  1. 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    2. No evidence of new intracranial aneurysm.    Results were discussed with the patient and conveyed to the neurosurgical team immediately following the procedure.    Evaluation and stenosis determination based on NASCET criteria.    MRI  HEAD MRI:  1.  Negative brain MRI. No acute intracranial finding. No evidence for recent  ischemia, intracranial hemorrhage, or mass.     HEAD MRA:  1.  Approximately 2 mm outpouching in the region of the right carotid cave may  reflect a small aneurysm. This is near the dural rings and could be intradural  in location.  2.  Otherwise negative brain MRA.        RELEVANT LABS  Ferritin 67    MRA 2021  HEAD MRI:   1.  Normal head MRI.     HEAD MRA:   1.  Accounting for differences in modality, no significant change in the size or morphology of a 1.3 x 1.7 x 2.1 mm right internal carotid artery superior hypophyseal aneurysm.   2.  No evidence of new intracranial aneurysm, high-flow vascular malformation or high-grade stenosis.     NECK MRA:  1.  Normal neck MRA.  MRA 2022-exam stable.  HEAD MRI:   1.  No evidence for recent infarction.     2.  No pathologic enhancement.     3.  No change from previous MRI brain evaluation 05/05/2021 as above.     HEAD MRA:   1.  Stable small unruptured right superior hypophyseal aneurysm. No evidence for adjacent hemorrhage or differential appearance of the aneurysm with respect to size/morphology and orientation. No new or progressive enlargement of aneurysms noted. Overall   [place] MRA stable from prior study.     NECK MRA:  1.  No hemodynamic stenosis. Stable MRA from previous evaluation.      IMPRESSION/REPORT/PLAN  Insomnia, unspecified type  Chronic  migraine  Cerebral aneurysm, nonruptured-stable  History of blepharoplasty surgery  Neck tightness/neck pain  Question hormonal migraines    This is a 47 year old female chronic migraines, unrelated cerebral aneurysm, insomnia.  Headaches had improved with the Botox that this is no longer approved by the insurance.  Insurance is already covering the Botox and will restart.  There might be a hormonal component as previously discussed with the patient.      For  of headaches:-Previously-she is using Nurtec and has stopped using Imitrex, Flexeril, propanolol, caffeine combination.  She is now only on the Imitrex 100 mg pills and injections for abortive therapy.    For prevention of headaches Botox was previously helping and will restart.  She is not sure if Emgality is helping or not and we will have her do it till the Botox becomes effective.  She has tried Botox, tizanidine, Flexeril, propanolol in the past.    She is sleeping well at night.  Can use Flexeril at nighttime as needed for insomnia neck stiffness.  Working well.  Continue.  Uses 2-3 times a week.    Further cerebral aneurysm she would need yearly scans though I think a MRA would be sufficient and she does not need a full diagnostic angiogram.  MRA from this year shows stable aneurysm.  Ordered MRI for .  Her neurosurgeon has left the organization she is looking to transition the care to me.  We will do annual MRIs.    Her headaches might be hormonal and previously options were discussed with the patient in the past.  She can continue birth control per direction of OB/GYN.  She remains on the Irma.  Stable.    Return in 2 months.  We will start with Botox today.  We will do the same paradigm as before as it worked well for her.    -     MRA Brain ([place] [facility] [place]) w/o Contrast; Future  -     galcanezumab-gnlm (EMGALITY) 120 MG/ML injection; Inject 1 mL (120 mg) Subcutaneous every 28 days  -     cyclobenzaprine (FLEXERIL) 5 MG tablet; Take 1  tablet (5 mg) by mouth 3 times daily as needed for muscle spasms  -     SUMAtriptan (IMITREX) 6 MG/0.5ML injection; Inject 0.5 mLs (6 mg) Subcutaneous as needed for migraine (Headache)  -     SUMAtriptan (IMITREX) 100 MG tablet; Take 1 tablet (100 mg) by mouth at onset of headache for migraine Can repeat in 2 hours if needed    Return for 2 months meds and 3 months Botox.    Over 30 minutes were spent coordinating the care for the patient on the day of the encounter.  This includes previsit, during visit and post visit activities as documented above.  Counseling patient.  Prescription management.  Multiple problems reviewed/addressed.  MRI ordered.  (Activities include but not inclusive of reviewing chart, reviewing outside records, reviewing labs and imaging study results as well as the images, patient visit time including getting history and exam,  use if applicable, review of test results with the patient and coming up with a plan in a shared model, counseling patient and family, education and answering patient questions, EMR , EMR diagnosis entry and problem list management, medication reconciliation and prescription management if applicable, paperwork if applicable, printing documents and documentation of the visit activities.)      King Taylor MD  Neurologist  [facility]  Tel:- [phone]    This note was dictated using voice recognition software.  Any grammatical or context distortions are unintentional and inherent to the software.

## 2023-01-31 NOTE — LETTER
1/31/2023         RE: Shyann Jurado  [address] [facility] [id number]        Dear Colleague,    Thank you for referring your patient, Shyann Jurado, to the [facility] NEUROLOGY [facility]IN[facility] [place]. Please see a copy of my visit note below.    NEUROLOGY OUTPATIENT PROGRESS NOTE   Jan 31, 2023     CHIEF COMPLAINT/REASON FOR VISIT/REASON FOR CONSULT  Patient presents with:  Med check: Pt is restarting Botox today, hasn't had in over a year due to insurance change. She is having headaches about once every 1-2 weeks    REASON FOR CONSULTATION- Headaches    HISTORY OF PRESENT ILLNESS  Shyann Jurado is a 47 year old female seen today for headaches. She reports that her headache started about 2-1/2 years ago. They have always been sporadic once or twice a month. No clear triggers for the headaches have been identified. To be related to birth control and she was switched from Irma to Sarah. This did not significantly help the headaches. There is some neck tension involved which could be leading to the headaches. Headaches generally are behind the right eye with some sharp pain photophobia and phonophobia. She does have a history of a cerebral aneurysm identified on the MRI done for headaches. Headaches are thought to be unrelated to the aneurysm. She is currently followed by Dr. Álvarez who plans to do yearly MRI brain to evaluate for aneurysms. Imitrex 50 mg has been beneficial in the past. She is also been using Flexeril intermittently. There is some history of iron deficiency. She has difficulty sleeping and uses melatonin and Unisom. Tizanidine did not provide any benefit and patient was switched to Flexeril. She has had some hair loss side effects with the Topamax. Propanolol tried last time could not be tolerated because of side effects and she stopped the medication. Imitrex injections have also been prescribed for abortive therapy though she mainly uses the oral tablets.    Patient returns today  reports that she is using 1 tablet of Flexeril every night. Headaches have significantly improved and she only has one headache in the last 6 weeks since I saw her. She used a combination of propanolol Flexeril Imitrex and caffeine as abortive therapy which worked well for her. Has gained some weight on the propanolol. Currently is off the propanolol on every day basis. Reports that her headache was at the time of the menstrual cycle. She is planning on seeing her OB/GYN may be change of birth control. Complains of some spotting. She is sleeping better with the Flexeril.    10/4/20  Patient returns today.  She reports that in the summer months she did not have any headaches no recently headaches have come back.  Headaches unchanged in nature.  The headache is still behind the right eye.  She wonders if the headaches might be related to her cycle being a bit off though is not completely sure what caused it.  There was some stress involved because her daughter tested positive for COVID though that should have made the headaches worse during summertime.  She did have one headache in September this started with right-sided neck tension and was slightly more severe than her baseline headaches.  She is using propanolol Flexeril Imitrex and caffeine for abortive therapy.  This combination seems to work for her.  Is not taking any preventive medication at this point.  Is using Flexeril to help with sleep.  Has rarely (about 2 times) needed to use the Imitrex injection.    She did receive a diagnostic angiogram and her aneurysm is stable.  This plans to do another angiogram in 1 year.    11/23/20  Patient returns today.  Reports that for the last 2 weeks she has been having a continuous headache.  She was prescribed nortriptyline when she had called the clinic which she has not tried so far.  She does not want to be on medications.  Is interested in getting the Botox done today.  She also gets cosmetic Botox for her  forehead.  She reports this was a month ago when she got 10 units.  She thinks her some of her headaches might be hormonal in nature.  She continues to use Leksell and Imitrex for abortive therapy.  We talked about her being premenopause.  She is going to stop her birth control.  She is thinking about hysterectomy.  Her mother needed to get hysterectomy done.  We talked about that the migraines might get worse stopping the birth control or after menopause.  She does not need to get the hysterectomy just because of the headaches.  There is no clear evidence that the hysterectomy will help the headaches.  Headaches otherwise unchanged nature.  She has a few bad headaches since she was last seen.    She is sleeping well at this point with the Flexeril at nighttime.  No aneurysm symptoms.    2/24/21  Patient returns today.  Reports that she is having 2 headache days a month with the Botox.  Denies any side effects.  Feels that her neck is less tight with the Botox.  She has stopped the propanolol.  She is no longer using the Imitrex oral tablets.  She still needs to use injections and has to use that twice.  Nurtec has been working well for abortive therapy.  Reports no aneurysm-like symptoms  Scheduled for bilateral blepharoplasty surgery next week.  Does not want us injecting in the forehead.  Is using her cyclobenzaprine at night and on as-needed basis.  Has talked to her OB/GYN and feels that hysterectomy will make things worse and that is on hold for right now.    4/20/21  Patient returns today.  She reports one bad migraine and 3 minor headaches in the last 2 months.  Overall Botox has been working really well.  Reports no side effects.  She has had a blepharoplasty surgery reports some scalp numbness behind her hairline.  Nurtec works really well for abortive therapy.  Imitrex injections occasionally she will have to use for more severe headaches.  Reports no aneurysm symptoms.  Has MRI scheduled for May.   Flexeril has been used as needed.  She stopped the propanolol.  Denies any other hormonal issues.    3/29/22  Patient returns today.  She was seen last year.  Has not done the Botox since then.  Headaches have gotten worse again.  She is having 9 days a month.  Previously used to be behind the right temple/eye and those headaches have not come back.  Exam more in the back of the head radiating up the neck.  There is associated photophobia and phonophobia.  She does use Flexeril as needed.  Is using Imitrex injections and tablets though that does make her sleepy.  Nurtec was working previously but is no longer that helpful.  She is interested in trying Emgality.  Is off the propanolol at this point.  Remains on the Irma birth control.  Does not think this is causing her headaches    7/26/22  Patient returns today  1.  She has been on Emgality for 3 months now.  Reports 1-2 headaches a month.  Generally uses Nurtec but if the headache is more severe needs to use the Imitrex.  We will use 100 mg of Imitrex (50x2).  Rarely needs to use the Imitrex injection.  2.  Is no longer needing to use the propanolol.  For neck pain she is intermittently using the Flexeril.  Uses it about 4 times a week.  3.  Aneurysm is stable on the MRI.  Wants to transition care to me since her neurosurgeon is no longer with [facility].  4.  Remains on the aspirin to control  5.  Is off the propanolol at this point.  6.  Sleeping well at night.    1/31/23  Patient returns for  1.  Reports that she has been doing the Emgality though it has not really been helping.  Will have headaches on Monday mornings.  Has been more active over the weekends which is possibly causing the headaches.  2.  For abortive therapy she is mainly using the Imitrex but is no longer using the propanolol or caffeine.  Does use Flexeril about 2 times a week to help her sleep as well as for neck issues.  3.  Wants to restart the Botox today with her new insurance  4.  No new  aneurysm symptoms.  Needs an MRI this year.  Remains on aspirin.  No new concerns/issues    Previous history is reviewed and this is unchanged.    PAST MEDICAL/SURGICAL HISTORY  Past Medical History:   Diagnosis Date     Anemia      Brain aneurysm      Chronic headaches      Colon polyps      Fatigue      Iron deficiency anemia      Ptosis of eyelid, bilateral      Vitamin D deficiency      Patient Active Problem List   Diagnosis     Vitamin D deficiency     Iron deficiency anemia     Insomnia     Hypothyroidism     History of colonic polyps     Fatigue     Chronic migraine     Brain aneurysm   Significant for high cholesterol, migraine headaches, depression, iron deficiency      FAMILY HISTORY  Family History   Problem Relation Age of Onset     Migraines Mother      Gallbladder Disease Maternal Aunt 75.00     Colon Cancer Paternal Aunt 50.00     Kidney Cancer Maternal Grandmother 70.00     Colon Cancer Maternal Grandfather 84.00     Prostate Cancer Paternal Grandfather 80.00     Kidney Cancer Paternal Grandfather      Colon Cancer Paternal Grandfather 78.00     Melanoma Paternal Aunt      Aneurysm Paternal Aunt         Brain     Melanoma Paternal Aunt 48.00     Aneurysm Mother      Melanoma Father      Other - See Comments Daughter         ELIAS     Hashimoto's thyroiditis Daughter      Other - See Comments Son         PANDAS     Aneurysm Maternal Uncle         Brain   Reviewed and negative for neurological conditions    SOCIAL HISTORY  Social History     Tobacco Use     Smoking status: Never     Smokeless tobacco: Never   Substance Use Topics     Alcohol use: Not Currently     Drug use: Never       SYSTEMS REVIEW  Twelve-system ROS was done and other than the HPI this was negative.   No new concerns    MEDICATIONS  acetaminophen (TYLENOL) 500 MG tablet, Take 500 mg by mouth every 6 hours as needed for mild pain  doxylamine (UNISOM) 25 MG TABS tablet, Take 25 mg by mouth nightly as needed  drospirenone-ethinyl  "estradiol (ADRIÁN) 3-0.03 MG tablet, 4 packs for 3 months, uses active birth control pill for 12 weeks and off for 1 week.  ibuprofen (ADVIL/MOTRIN) 800 MG tablet, Take 800 mg by mouth every 8 hours as needed for moderate pain  melatonin 3 MG tablet, Take 1 mg by mouth nightly as needed for sleep  Rimegepant Sulfate (NURTEC) 75 MG TBDP, Take 1 tablet by mouth as needed (Migraine)  SUMAtriptan (IMITREX STATDOSE) 6 MG/0.5ML pen injector kit, Inject 0.5 mLs (6 mg) Subcutaneous at onset of headache for migraine  SUMAtriptan (IMITREX) 100 MG tablet, Take 1 tablet (100 mg) by mouth at onset of headache for migraine Can repeat in 2 hours if needed  vitamin D2 (ERGOCALCIFEROL) 22550 units (1250 mcg) capsule, TAKE 1 CAPSULE BY MOUTH WEEKLY  galcanezumab-gnlm (EMGALITY) 120 MG/ML injection, Inject 1 mL (120 mg) Subcutaneous every 28 days (Patient not taking: Reported on 1/31/2023)  tretinoin (RETIN-A) 0.1 % external cream, APPLY PEA SIZED AMOUNT TOPICALLY TO THE AFFECTED AREA EVERY DAY AT BEDTIME AS DIRECTED (Patient not taking: Reported on 1/31/2023)    Botulinum Toxin Type A (BOTOX) 200 units injection 155 Units  Botulinum Toxin Type A (BOTOX) 200 units injection 200 Units       PHYSICAL EXAMINATION  VITALS: /60 (BP Location: Right arm, Patient Position: Sitting)   Pulse 81   Ht 1.594 m (5' 2.75\")   Wt 67.1 kg (148 lb)   BMI 26.43 kg/m    GENERAL: Healthy appearing, alert, no acute distress, normal habitus.  CARDIOVASCULAR: Ext warm and well perfused. Pulses present.   NEUROLOGICAL: Patient is awake and oriented to self, place and time. Attention span is normal. Memory grossly intact. Language is fluent and follows commands appropriately. Appropriate fund of knowledge. Cranial nerves 2-12 are intact. There is no pronator drift. Motor exam shows 5/5 strength in all extremities. Tone is symmetric bilaterally in upper and lower extremities. Finger to nose is without dysmetria. Gait is normal and the patient is able " to do tandem walk.  Exam similar to before.    DIAGNOSTICS  DSA  Conically-shaped right internal carotid artery superior hypophyseal   aneurysm measures 1.3 mm deep by 1.7 mm x 2.1 mm across. The aneurysm neck   measures up to 2.1 mm in maximum dimension. There is very mild fusiform   ectasia of the paraclinoid segment   giving rise to this more focal saccular aneurysm.    DSA 2020  1. 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    2. No evidence of new intracranial aneurysm.    Results were discussed with the patient and conveyed to the neurosurgical team immediately following the procedure.    Evaluation and stenosis determination based on NASCET criteria.    MRI  HEAD MRI:  1.  Negative brain MRI. No acute intracranial finding. No evidence for recent  ischemia, intracranial hemorrhage, or mass.     HEAD MRA:  1.  Approximately 2 mm outpouching in the region of the right carotid cave may  reflect a small aneurysm. This is near the dural rings and could be intradural  in location.  2.  Otherwise negative brain MRA.        RELEVANT LABS  Ferritin 67    MRA 2021  HEAD MRI:   1.  Normal head MRI.     HEAD MRA:   1.  Accounting for differences in modality, no significant change in the size or morphology of a 1.3 x 1.7 x 2.1 mm right internal carotid artery superior hypophyseal aneurysm.   2.  No evidence of new intracranial aneurysm, high-flow vascular malformation or high-grade stenosis.     NECK MRA:  1.  Normal neck MRA.  MRA 2022-exam stable.  HEAD MRI:   1.  No evidence for recent infarction.     2.  No pathologic enhancement.     3.  No change from previous MRI brain evaluation 05/05/2021 as above.     HEAD MRA:   1.  Stable small unruptured right superior hypophyseal aneurysm. No evidence for adjacent hemorrhage or differential appearance of the aneurysm with respect to size/morphology and  orientation. No new or progressive enlargement of aneurysms noted. Overall   [facility] MRA stable from prior study.     NECK MRA:  1.  No hemodynamic stenosis. Stable MRA from previous evaluation.      IMPRESSION/REPORT/PLAN  Insomnia, unspecified type  Chronic migraine  Cerebral aneurysm, nonruptured-stable  History of blepharoplasty surgery  Neck tightness/neck pain  Question hormonal migraines    This is a 47 year old female chronic migraines, unrelated cerebral aneurysm, insomnia.  Headaches had improved with the Botox that this is no longer approved by the insurance.  Insurance is already covering the Botox and will restart.  There might be a hormonal component as previously discussed with the patient.      For  of headaches:-Previously-she is using Nurtec and has stopped using Imitrex, Flexeril, propanolol, caffeine combination.  She is now only on the Imitrex 100 mg pills and injections for abortive therapy.    For prevention of headaches Botox was previously helping and will restart.  She is not sure if Emgality is helping or not and we will have her do it till the Botox becomes effective.  She has tried Botox, tizanidine, Flexeril, propanolol in the past.    She is sleeping well at night.  Can use Flexeril at nighttime as needed for insomnia neck stiffness.  Working well.  Continue.  Uses 2-3 times a week.    Further cerebral aneurysm she would need yearly scans though I think a MRA would be sufficient and she does not need a full diagnostic angiogram.  MRA from this year shows stable aneurysm.  Ordered MRI for .  Her neurosurgeon has left the organization she is looking to transition the care to me.  We will do annual MRIs.    Her headaches might be hormonal and previously options were discussed with the patient in the past.  She can continue birth control per direction of OB/GYN.  She remains on the Irma.  Stable.    Return in 2 months.  We will start with Botox today.  We will do the  same paradigm as before as it worked well for her.    -     MRA Brain ([facility]) w/o Contrast; Future  -     galcanezumab-gnlm (EMGALITY) 120 MG/ML injection; Inject 1 mL (120 mg) Subcutaneous every 28 days  -     cyclobenzaprine (FLEXERIL) 5 MG tablet; Take 1 tablet (5 mg) by mouth 3 times daily as needed for muscle spasms  -     SUMAtriptan (IMITREX) 6 MG/0.5ML injection; Inject 0.5 mLs (6 mg) Subcutaneous as needed for migraine (Headache)  -     SUMAtriptan (IMITREX) 100 MG tablet; Take 1 tablet (100 mg) by mouth at onset of headache for migraine Can repeat in 2 hours if needed    Return for 2 months meds and 3 months Botox.    Over 30 minutes were spent coordinating the care for the patient on the day of the encounter.  This includes previsit, during visit and post visit activities as documented above.  Counseling patient.  Prescription management.  Multiple problems reviewed/addressed.  MRI ordered.  (Activities include but not inclusive of reviewing chart, reviewing outside records, reviewing labs and imaging study results as well as the images, patient visit time including getting history and exam,  use if applicable, review of test results with the patient and coming up with a plan in a shared model, counseling patient and family, education and answering patient questions, EMR , EMR diagnosis entry and problem list management, medication reconciliation and prescription management if applicable, paperwork if applicable, printing documents and documentation of the visit activities.)      King Taylor MD  Neurologist  [facility] Neurology [facility]  Tel:- [phone]    This note was dictated using voice recognition software.  Any grammatical or context distortions are unintentional and inherent to the software.        Again, thank you for allowing me to participate in the care of your patient.        Sincerely,        King Taylor MD

## 2023-01-31 NOTE — NURSING NOTE
Chief Complaint   Patient presents with     Med check     Pt is restarting Botox today, hasn't had in over a year due to insurance change. She is having headaches about once every 1-2 weeks     Radha Khan LPN on 1/31/2023 at 11:14 AM

## 2023-01-31 NOTE — LETTER
1/31/2023         RE: Shyann Jurado  [address] [place] [id number]        Dear Colleague,    Thank you for referring your patient, Shyann Jurado, to the [facility]UR[facility] [place]. Please see a copy of my visit note below.    See procedure.         Again, thank you for allowing me to participate in the care of your patient.        Sincerely,        King Taylor MD

## 2023-01-31 NOTE — PROCEDURES
NEUROLOGY PROCEDURE NOTE  Jan 31, 2023      Chronic migraine Botox injection    CONSENT: The procedure was explained to the patient. The risks and benefits of the procedure and the alternatives were discussed with the patient. The patient was given an opportunity to ask any questions she had about the procedure. A verbal consent was obtained from the patient. A written consent is available in the chart.    PRE-PROCEDURE  Diagnosis: Chronic migraine  Headache frequency before the use of Botox:- 30 headache days per month  Headache frequency with Botox use:-0-1 headache days per month (restarting Botox today)    EXAM  GENERAL: Healthy appearing, alert, no acute distress, normal habitus.  NEUROLOGICAL:  Patient is alert with fluent language.  Extraocular movements are intact.  Facial movements are present and symmetric.  No arm drift.    PROCEDURE SUMMARY:   The following muscles were identified after palpating for landmarks and the overlying skin was cleansed with alcohol. These muscles were then injected using a 30 guage- half  inch needle with the following doses of onabotulinumtoxin A. A 5 unit/ 0.1 ml dilution was used for the injections. A 2 x 100 unit vial was used.    1. Corrugators 5 units each side (not done).  2. Procerus 5 units (not done).  3. Frontalis 10 units each side (not done).  4. Temporalis 20 units each side.  5. Occipitalis 15 units each side.  6. Paraspinals 15 units each side.  7. Trapezius 25 units each side.    Patient has had a blepharoplasty and will hold off injecting in the forehead.    Units Injected: 150 Units  Units Discarded: 50 Units  Lot Number and Expiration: [id number]; 03/2025    The patient tolerated the procedure without any immediate complaints and will call the Neurology clinic if she has any problems or side effects from the medication. The patient will follow up in the Neurology clinic as previously decided.      King Taylor MD  Neurologist  [facility] Neurology  [facility]  [phone]

## 2023-02-09 ENCOUNTER — ANCILLARY ORDERS (OUTPATIENT)
Dept: MAMMOGRAPHY | Facility: CLINIC | Age: 48
End: 2023-02-09

## 2023-02-09 ENCOUNTER — ANCILLARY ORDERS (OUTPATIENT)
Dept: FAMILY MEDICINE | Facility: CLINIC | Age: 48
End: 2023-02-09

## 2023-02-09 DIAGNOSIS — Z12.31 VISIT FOR SCREENING MAMMOGRAM: ICD-10-CM

## 2023-03-06 ENCOUNTER — ANCILLARY PROCEDURE (OUTPATIENT)
Dept: MAMMOGRAPHY | Facility: CLINIC | Age: 48
End: 2023-03-06
Attending: FAMILY MEDICINE
Payer: COMMERCIAL

## 2023-03-06 DIAGNOSIS — Z12.31 VISIT FOR SCREENING MAMMOGRAM: ICD-10-CM

## 2023-03-06 PROCEDURE — 77067 SCR MAMMO BI INCL CAD: CPT

## 2023-04-04 ENCOUNTER — OFFICE VISIT (OUTPATIENT)
Dept: NEUROLOGY | Facility: CLINIC | Age: 48
End: 2023-04-04
Payer: COMMERCIAL

## 2023-04-04 ENCOUNTER — TELEPHONE (OUTPATIENT)
Dept: NEUROLOGY | Facility: CLINIC | Age: 48
End: 2023-04-04

## 2023-04-04 VITALS
WEIGHT: 145 LBS | DIASTOLIC BLOOD PRESSURE: 57 MMHG | BODY MASS INDEX: 25.89 KG/M2 | RESPIRATION RATE: 16 BRPM | HEART RATE: 78 BPM | SYSTOLIC BLOOD PRESSURE: 91 MMHG

## 2023-04-04 DIAGNOSIS — I67.1 CEREBRAL ANEURYSM, NONRUPTURED: ICD-10-CM

## 2023-04-04 DIAGNOSIS — G43.719 INTRACTABLE CHRONIC MIGRAINE WITHOUT AURA AND WITHOUT STATUS MIGRAINOSUS: Primary | ICD-10-CM

## 2023-04-04 DIAGNOSIS — G47.00 INSOMNIA, UNSPECIFIED TYPE: ICD-10-CM

## 2023-04-04 DIAGNOSIS — M54.2 NECK PAIN: ICD-10-CM

## 2023-04-04 DIAGNOSIS — G43.719 INTRACTABLE CHRONIC MIGRAINE WITHOUT AURA AND WITHOUT STATUS MIGRAINOSUS: ICD-10-CM

## 2023-04-04 PROCEDURE — 99214 OFFICE O/P EST MOD 30 MIN: CPT | Performed by: PSYCHIATRY & NEUROLOGY

## 2023-04-04 RX ORDER — SUMATRIPTAN 100 MG/1
100 TABLET, FILM COATED ORAL
Qty: 18 TABLET | Refills: 11 | Status: SHIPPED | OUTPATIENT
Start: 2023-04-04 | End: 2024-01-31

## 2023-04-04 RX ORDER — CEFUROXIME AXETIL 250 MG/1
6 TABLET ORAL
Qty: 1 KIT | Refills: 1 | Status: SHIPPED | OUTPATIENT
Start: 2023-04-04 | End: 2023-08-29

## 2023-04-04 RX ORDER — RIMEGEPANT SULFATE 75 MG/75MG
75 TABLET, ORALLY DISINTEGRATING ORAL PRN
Qty: 12 TABLET | Refills: 11 | Status: SHIPPED | OUTPATIENT
Start: 2023-04-04 | End: 2023-04-06

## 2023-04-04 NOTE — LETTER
4/4/2023         RE: Shyann Jurado  [address] [facility] [address]        Dear Colleague,    Thank you for referring your patient, Shyann Jurado, to the [facility] [place]. Please see a copy of my visit note below.    NEUROLOGY OUTPATIENT PROGRESS NOTE   Apr 4, 2023     CHIEF COMPLAINT/REASON FOR VISIT/REASON FOR CONSULT  Patient presents with:  Follow Up    REASON FOR CONSULTATION- Headaches    HISTORY OF PRESENT ILLNESS  Shyann Jurado is a 48 year old female seen today for headaches. She reports that her headache started about 2-1/2 years ago. They have always been sporadic once or twice a month. No clear triggers for the headaches have been identified. To be related to birth control and she was switched from Irma to Sarah. This did not significantly help the headaches. There is some neck tension involved which could be leading to the headaches. Headaches generally are behind the right eye with some sharp pain photophobia and phonophobia. She does have a history of a cerebral aneurysm identified on the MRI done for headaches. Headaches are thought to be unrelated to the aneurysm. She is currently followed by Dr. Álvarez who plans to do yearly MRI brain to evaluate for aneurysms. Imitrex 50 mg has been beneficial in the past. She is also been using Flexeril intermittently. There is some history of iron deficiency. She has difficulty sleeping and uses melatonin and Unisom. Tizanidine did not provide any benefit and patient was switched to Flexeril. She has had some hair loss side effects with the Topamax. Propanolol tried last time could not be tolerated because of side effects and she stopped the medication. Imitrex injections have also been prescribed for abortive therapy though she mainly uses the oral tablets.    Patient returns today reports that she is using 1 tablet of Flexeril every night. Headaches have significantly improved and she only has one headache in the last  6 weeks since I saw her. She used a combination of propanolol Flexeril Imitrex and caffeine as abortive therapy which worked well for her. Has gained some weight on the propanolol. Currently is off the propanolol on every day basis. Reports that her headache was at the time of the menstrual cycle. She is planning on seeing her OB/GYN may be change of birth control. Complains of some spotting. She is sleeping better with the Flexeril.    10/4/20  Patient returns today.  She reports that in the summer months she did not have any headaches no recently headaches have come back.  Headaches unchanged in nature.  The headache is still behind the right eye.  She wonders if the headaches might be related to her cycle being a bit off though is not completely sure what caused it.  There was some stress involved because her daughter tested positive for COVID though that should have made the headaches worse during summertime.  She did have one headache in September this started with right-sided neck tension and was slightly more severe than her baseline headaches.  She is using propanolol Flexeril Imitrex and caffeine for abortive therapy.  This combination seems to work for her.  Is not taking any preventive medication at this point.  Is using Flexeril to help with sleep.  Has rarely (about 2 times) needed to use the Imitrex injection.    She did receive a diagnostic angiogram and her aneurysm is stable.  This plans to do another angiogram in 1 year.    11/23/20  Patient returns today.  Reports that for the last 2 weeks she has been having a continuous headache.  She was prescribed nortriptyline when she had called the clinic which she has not tried so far.  She does not want to be on medications.  Is interested in getting the Botox done today.  She also gets cosmetic Botox for her forehead.  She reports this was a month ago when she got 10 units.  She thinks her some of her headaches might be hormonal in nature.  She continues  to use [place] and Imitrex for abortive therapy.  We talked about her being premenopause.  She is going to stop her birth control.  She is thinking about hysterectomy.  Her mother needed to get hysterectomy done.  We talked about that the migraines might get worse stopping the birth control or after menopause.  She does not need to get the hysterectomy just because of the headaches.  There is no clear evidence that the hysterectomy will help the headaches.  Headaches otherwise unchanged nature.  She has a few bad headaches since she was last seen.    She is sleeping well at this point with the Flexeril at nighttime.  No aneurysm symptoms.    2/24/21  Patient returns today.  Reports that she is having 2 headache days a month with the Botox.  Denies any side effects.  Feels that her neck is less tight with the Botox.  She has stopped the propanolol.  She is no longer using the Imitrex oral tablets.  She still needs to use injections and has to use that twice.  Nurtec has been working well for abortive therapy.  Reports no aneurysm-like symptoms  Scheduled for bilateral blepharoplasty surgery next week.  Does not want us injecting in the forehead.  Is using her cyclobenzaprine at night and on as-needed basis.  Has talked to her OB/GYN and feels that hysterectomy will make things worse and that is on hold for right now.    4/20/21  Patient returns today.  She reports one bad migraine and 3 minor headaches in the last 2 months.  Overall Botox has been working really well.  Reports no side effects.  She has had a blepharoplasty surgery reports some scalp numbness behind her hairline.  Nurtec works really well for abortive therapy.  Imitrex injections occasionally she will have to use for more severe headaches.  Reports no aneurysm symptoms.  Has MRI scheduled for May.  Flexeril has been used as needed.  She stopped the propanolol.  Denies any other hormonal issues.    3/29/22  Patient returns today.  She was seen last  year.  Has not done the Botox since then.  Headaches have gotten worse again.  She is having 9 days a month.  Previously used to be behind the right temple/eye and those headaches have not come back.  Exam more in the back of the head radiating up the neck.  There is associated photophobia and phonophobia.  She does use Flexeril as needed.  Is using Imitrex injections and tablets though that does make her sleepy.  Nurtec was working previously but is no longer that helpful.  She is interested in trying Emgality.  Is off the propanolol at this point.  Remains on the Irma birth control.  Does not think this is causing her headaches    7/26/22  Patient returns today  1.  She has been on Emgality for 3 months now.  Reports 1-2 headaches a month.  Generally uses Nurtec but if the headache is more severe needs to use the Imitrex.  We will use 100 mg of Imitrex (50x2).  Rarely needs to use the Imitrex injection.  2.  Is no longer needing to use the propanolol.  For neck pain she is intermittently using the Flexeril.  Uses it about 4 times a week.  3.  Aneurysm is stable on the MRI.  Wants to transition care to me since her neurosurgeon is no longer with [place].  4.  Remains on the aspirin to control  5.  Is off the propanolol at this point.  6.  Sleeping well at night.    1/31/23  Patient returns for  1.  Reports that she has been doing the Emgality though it has not really been helping.  Will have headaches on Monday mornings.  Has been more active over the weekends which is possibly causing the headaches.  2.  For abortive therapy she is mainly using the Imitrex but is no longer using the propanolol or caffeine.  Does use Flexeril about 2 times a week to help her sleep as well as for neck issues.  3.  Wants to restart the Botox today with her new insurance  4.  No new aneurysm symptoms.  Needs an MRI this year.  Remains on aspirin.  No new concerns/issues    4/4/23  Patient returns today  1.  She did extremely well with  the Botox.  No side effects.  Has had 2 headaches in the last 2 months.  The first headache was right after the Botox and the second headache was the end of March at the time of her menstrual cycle.  2.  Imitrex is helping for abortive therapy.  She is no longer needing to use the propanolol or caffeine.  Also has Nurtec as backup.  3.  Has not been able to do the MRI so far.  No aneurysm symptoms.  4.  Occasionally will use Flexeril at nighttime though sometimes that causes her to oversleep and cause headaches to get worse.  No new concerns.    Previous history is reviewed and this is unchanged.    PAST MEDICAL/SURGICAL HISTORY  Past Medical History:   Diagnosis Date     Anemia      Brain aneurysm      Chronic headaches      Colon polyps      Fatigue      Iron deficiency anemia      Ptosis of eyelid, bilateral      Vitamin D deficiency      Patient Active Problem List   Diagnosis     Vitamin D deficiency     Iron deficiency anemia     Insomnia     Hypothyroidism     History of colonic polyps     Fatigue     Chronic migraine     Brain aneurysm   Significant for high cholesterol, migraine headaches, depression, iron deficiency      FAMILY HISTORY  Family History   Problem Relation Age of Onset     Migraines Mother      Aneurysm Mother      Melanoma Father      Other - See Comments Daughter         PANDAS     Hashimoto's thyroiditis Daughter      Kidney Cancer Maternal Grandmother 70     Colon Cancer Maternal Grandfather 84     Prostate Cancer Paternal Grandfather 80     Kidney Cancer Paternal Grandfather      Colon Cancer Paternal Grandfather 78     Other - See Comments Son         PANDAS     Gallbladder Disease Maternal Aunt 75     Aneurysm Maternal Uncle         Brain     Colon Cancer Paternal Aunt 50     Melanoma Paternal Aunt      Aneurysm Paternal Aunt         Brain     Melanoma Paternal Aunt 48     Breast Cancer No family hx of      Ovarian Cancer No family hx of    Reviewed and negative for neurological  conditions    SOCIAL HISTORY  Social History     Tobacco Use     Smoking status: Never     Smokeless tobacco: Never   Substance Use Topics     Alcohol use: Not Currently     Drug use: Never       SYSTEMS REVIEW  Twelve-system ROS was done and other than the HPI this was negative.   No new concerns/issues.    MEDICATIONS  acetaminophen (TYLENOL) 500 MG tablet, Take 500 mg by mouth every 6 hours as needed for mild pain  doxylamine (UNISOM) 25 MG TABS tablet, Take 25 mg by mouth nightly as needed  drospirenone-ethinyl estradiol (ADRIÁN) 3-0.03 MG tablet, 4 packs for 3 months, uses active birth control pill for 12 weeks and off for 1 week.  ibuprofen (ADVIL/MOTRIN) 800 MG tablet, Take 800 mg by mouth every 8 hours as needed for moderate pain  melatonin 3 MG tablet, Take 1 mg by mouth nightly as needed for sleep  tretinoin (RETIN-A) 0.1 % external cream,   vitamin D2 (ERGOCALCIFEROL) 38036 units (1250 mcg) capsule, TAKE 1 CAPSULE BY MOUTH WEEKLY    Botulinum Toxin Type A (BOTOX) 200 units injection 155 Units  Botulinum Toxin Type A (BOTOX) 200 units injection 200 Units       PHYSICAL EXAMINATION  VITALS: BP 91/57   Pulse 78   Resp 16   Wt 65.8 kg (145 lb)   BMI 25.89 kg/m    GENERAL: Healthy appearing, alert, no acute distress, normal habitus.  CARDIOVASCULAR: Ext warm and well perfused. Pulses present.   NEUROLOGICAL: Patient is awake and oriented to self, place and time. Attention span is normal. Memory grossly intact. Language is fluent and follows commands appropriately. Appropriate fund of knowledge. Cranial nerves 2-12 are intact. There is no pronator drift. Motor exam shows 5/5 strength in all extremities. Tone is symmetric bilaterally in upper and lower extremities. Finger to nose is without dysmetria. Gait is normal and the patient is able to do tandem walk.  No change in exam today.    DIAGNOSTICS  DSA  Conically-shaped right internal carotid artery superior hypophyseal   aneurysm measures 1.3 mm deep by  1.7 mm x 2.1 mm across. The aneurysm neck   measures up to 2.1 mm in maximum dimension. There is very mild fusiform   ectasia of the paraclinoid segment   giving rise to this more focal saccular aneurysm.    DSA 2020  1. 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    2. No evidence of new intracranial aneurysm.    Results were discussed with the patient and conveyed to the neurosurgical team immediately following the procedure.    Evaluation and stenosis determination based on NASCET criteria.    MRI  HEAD MRI:  1.  Negative brain MRI. No acute intracranial finding. No evidence for recent  ischemia, intracranial hemorrhage, or mass.     HEAD MRA:  1.  Approximately 2 mm outpouching in the region of the right carotid cave may  reflect a small aneurysm. This is near the dural rings and could be intradural  in location.  2.  Otherwise negative brain MRA.        RELEVANT LABS  Ferritin 67    MRA 2021  HEAD MRI:   1.  Normal head MRI.     HEAD MRA:   1.  Accounting for differences in modality, no significant change in the size or morphology of a 1.3 x 1.7 x 2.1 mm right internal carotid artery superior hypophyseal aneurysm.   2.  No evidence of new intracranial aneurysm, high-flow vascular malformation or high-grade stenosis.     NECK MRA:  1.  Normal neck MRA.  MRA 2022-exam stable.  HEAD MRI:   1.  No evidence for recent infarction.     2.  No pathologic enhancement.     3.  No change from previous MRI brain evaluation 05/05/2021 as above.     HEAD MRA:   1.  Stable small unruptured right superior hypophyseal aneurysm. No evidence for adjacent hemorrhage or differential appearance of the aneurysm with respect to size/morphology and orientation. No new or progressive enlargement of aneurysms noted. Overall   [place] MRA stable from prior study.     NECK MRA:  1.  No hemodynamic stenosis.  Stable MRA from previous evaluation.      IMPRESSION/REPORT/PLAN  Insomnia, unspecified type  Chronic migraine  Cerebral aneurysm, nonruptured-stable  History of blepharoplasty surgery  Neck tightness/neck pain  Question hormonal migraines    This is a 48 year old female chronic migraines, unrelated cerebral aneurysm, insomnia.  There might be a hormonal component for her headaches.  Imaging studies have been negative in the past.    For prevention of her migraines she is currently on Botox.  Emgality has not helped in the past.  She has tried tizanidine, Flexeril, propanolol in the past.    For  of headaches she is currently on Nurtec and occasionally will use Imitrex tablets/injections depending on severity of the headaches.  Has also tried a combination of Imitrex, Flexeril, propanolol, caffeine in the past.    She is sleeping well at night.  Occasionally will use Flexeril if there is too much stiffness in the neck.  Some and this makes the headaches worse as she oversleeps.    She has a history of right cerebral artery aneurysm.  Previous MRIs have been stable.  We will need a repeat MRA this year.  Needs yearly MRIs.    Her headaches might be hormonal and previously options were discussed with the patient in the past.  She can continue birth control per direction of OB/GYN.  She remains on the Irma.  Stable.    Return in 1 year.  Continue Botox every 3 months.    -     cyclobenzaprine (FLEXERIL) 5 MG tablet; Take 1 tablet (5 mg) by mouth 3 times daily as needed for muscle spasms  -     SUMAtriptan (IMITREX STATDOSE) 6 MG/0.5ML pen injector kit; Inject 0.5 mLs (6 mg) Subcutaneous at onset of headache for migraine  -     SUMAtriptan (IMITREX) 100 MG tablet; Take 1 tablet (100 mg) by mouth at onset of headache for migraine Can repeat in 2 hours if needed  -     rimegepant (NURTEC) 75 MG ODT tablet; Take 1 tablet (75 mg) by mouth as needed (Migraine)  -     MRA Brain ([place] [facility] [place]) wo & w Contrast;  Future    Return in about 1 year (around 4/4/2024) for In-Clinic Visit (must), After testing.    Over 31 minutes were spent coordinating the care for the patient on the day of the encounter.  This includes previsit, during visit and post visit activities as documented above.  Counseled patient.  Prescription management.  MRI ordered.  Multiple problems reviewed/addressed.  (Activities include but not inclusive of reviewing chart, reviewing outside records, reviewing labs and imaging study results as well as the images, patient visit time including getting history and exam,  use if applicable, review of test results with the patient and coming up with a plan in a shared model, counseling patient and family, education and answering patient questions, EMR , EMR diagnosis entry and problem list management, medication reconciliation and prescription management if applicable, paperwork if applicable, printing documents and documentation of the visit activities.)      King Taylor MD  Neurologist  [facility]  Tel:- [phone]    This note was dictated using voice recognition software.  Any grammatical or context distortions are unintentional and inherent to the software.        Again, thank you for allowing me to participate in the care of your patient.        Sincerely,        King Taylor MD

## 2023-04-04 NOTE — PROGRESS NOTES
NEUROLOGY OUTPATIENT PROGRESS NOTE   Apr 4, 2023     CHIEF COMPLAINT/REASON FOR VISIT/REASON FOR CONSULT  Patient presents with:  Follow Up    REASON FOR CONSULTATION- Headaches    HISTORY OF PRESENT ILLNESS  Shyann Jurado is a 48 year old female seen today for headaches. She reports that her headache started about 2-1/2 years ago. They have always been sporadic once or twice a month. No clear triggers for the headaches have been identified. To be related to birth control and she was switched from Irma to Sarah. This did not significantly help the headaches. There is some neck tension involved which could be leading to the headaches. Headaches generally are behind the right eye with some sharp pain photophobia and phonophobia. She does have a history of a cerebral aneurysm identified on the MRI done for headaches. Headaches are thought to be unrelated to the aneurysm. She is currently followed by Dr. Álvarez who plans to do yearly MRI brain to evaluate for aneurysms. Imitrex 50 mg has been beneficial in the past. She is also been using Flexeril intermittently. There is some history of iron deficiency. She has difficulty sleeping and uses melatonin and Unisom. Tizanidine did not provide any benefit and patient was switched to Flexeril. She has had some hair loss side effects with the Topamax. Propanolol tried last time could not be tolerated because of side effects and she stopped the medication. Imitrex injections have also been prescribed for abortive therapy though she mainly uses the oral tablets.    Patient returns today reports that she is using 1 tablet of Flexeril every night. Headaches have significantly improved and she only has one headache in the last 6 weeks since I saw her. She used a combination of propanolol Flexeril Imitrex and caffeine as abortive therapy which worked well for her. Has gained some weight on the propanolol. Currently is off the propanolol on every day basis. Reports that her  headache was at the time of the menstrual cycle. She is planning on seeing her OB/GYN may be change of birth control. Complains of some spotting. She is sleeping better with the Flexeril.    10/4/20  Patient returns today.  She reports that in the summer months she did not have any headaches no recently headaches have come back.  Headaches unchanged in nature.  The headache is still behind the right eye.  She wonders if the headaches might be related to her cycle being a bit off though is not completely sure what caused it.  There was some stress involved because her daughter tested positive for COVID though that should have made the headaches worse during summertime.  She did have one headache in September this started with right-sided neck tension and was slightly more severe than her baseline headaches.  She is using propanolol Flexeril Imitrex and caffeine for abortive therapy.  This combination seems to work for her.  Is not taking any preventive medication at this point.  Is using Flexeril to help with sleep.  Has rarely (about 2 times) needed to use the Imitrex injection.    She did receive a diagnostic angiogram and her aneurysm is stable.  This plans to do another angiogram in 1 year.    11/23/20  Patient returns today.  Reports that for the last 2 weeks she has been having a continuous headache.  She was prescribed nortriptyline when she had called the clinic which she has not tried so far.  She does not want to be on medications.  Is interested in getting the Botox done today.  She also gets cosmetic Botox for her forehead.  She reports this was a month ago when she got 10 units.  She thinks her some of her headaches might be hormonal in nature.  She continues to use Le[place]ell and Imitrex for abortive therapy.  We talked about her being premenopause.  She is going to stop her birth control.  She is thinking about hysterectomy.  Her mother needed to get hysterectomy done.  We talked about that the migraines  might get worse stopping the birth control or after menopause.  She does not need to get the hysterectomy just because of the headaches.  There is no clear evidence that the hysterectomy will help the headaches.  Headaches otherwise unchanged nature.  She has a few bad headaches since she was last seen.    She is sleeping well at this point with the Flexeril at nighttime.  No aneurysm symptoms.    2/24/21  Patient returns today.  Reports that she is having 2 headache days a month with the Botox.  Denies any side effects.  Feels that her neck is less tight with the Botox.  She has stopped the propanolol.  She is no longer using the Imitrex oral tablets.  She still needs to use injections and has to use that twice.  Nurtec has been working well for abortive therapy.  Reports no aneurysm-like symptoms  Scheduled for bilateral blepharoplasty surgery next week.  Does not want us injecting in the forehead.  Is using her cyclobenzaprine at night and on as-needed basis.  Has talked to her OB/GYN and feels that hysterectomy will make things worse and that is on hold for right now.    4/20/21  Patient returns today.  She reports one bad migraine and 3 minor headaches in the last 2 months.  Overall Botox has been working really well.  Reports no side effects.  She has had a blepharoplasty surgery reports some scalp numbness behind her hairline.  Nurtec works really well for abortive therapy.  Imitrex injections occasionally she will have to use for more severe headaches.  Reports no aneurysm symptoms.  Has MRI scheduled for May.  Flexeril has been used as needed.  She stopped the propanolol.  Denies any other hormonal issues.    3/29/22  Patient returns today.  She was seen last year.  Has not done the Botox since then.  Headaches have gotten worse again.  She is having 9 days a month.  Previously used to be behind the right temple/eye and those headaches have not come back.  Exam more in the back of the head radiating up the  neck.  There is associated photophobia and phonophobia.  She does use Flexeril as needed.  Is using Imitrex injections and tablets though that does make her sleepy.  Nurtec was working previously but is no longer that helpful.  She is interested in trying Emgality.  Is off the propanolol at this point.  Remains on the Irma birth control.  Does not think this is causing her headaches    7/26/22  Patient returns today  1.  She has been on Emgality for 3 months now.  Reports 1-2 headaches a month.  Generally uses Nurtec but if the headache is more severe needs to use the Imitrex.  We will use 100 mg of Imitrex (50x2).  Rarely needs to use the Imitrex injection.  2.  Is no longer needing to use the propanolol.  For neck pain she is intermittently using the Flexeril.  Uses it about 4 times a week.  3.  Aneurysm is stable on the MRI.  Wants to transition care to me since her neurosurgeon is no longer with [facility].  4.  Remains on the aspirin to control  5.  Is off the propanolol at this point.  6.  Sleeping well at night.    1/31/23  Patient returns for  1.  Reports that she has been doing the Emgality though it has not really been helping.  Will have headaches on Monday mornings.  Has been more active over the weekends which is possibly causing the headaches.  2.  For abortive therapy she is mainly using the Imitrex but is no longer using the propanolol or caffeine.  Does use Flexeril about 2 times a week to help her sleep as well as for neck issues.  3.  Wants to restart the Botox today with her new insurance  4.  No new aneurysm symptoms.  Needs an MRI this year.  Remains on aspirin.  No new concerns/issues    4/4/23  Patient returns today  1.  She did extremely well with the Botox.  No side effects.  Has had 2 headaches in the last 2 months.  The first headache was right after the Botox and the second headache was the end of March at the time of her menstrual cycle.  2.  Imitrex is helping for abortive therapy.  She  is no longer needing to use the propanolol or caffeine.  Also has Nurtec as backup.  3.  Has not been able to do the MRI so far.  No aneurysm symptoms.  4.  Occasionally will use Flexeril at nighttime though sometimes that causes her to oversleep and cause headaches to get worse.  No new concerns.    Previous history is reviewed and this is unchanged.    PAST MEDICAL/SURGICAL HISTORY  Past Medical History:   Diagnosis Date     Anemia      Brain aneurysm      Chronic headaches      Colon polyps      Fatigue      Iron deficiency anemia      Ptosis of eyelid, bilateral      Vitamin D deficiency      Patient Active Problem List   Diagnosis     Vitamin D deficiency     Iron deficiency anemia     Insomnia     Hypothyroidism     History of colonic polyps     Fatigue     Chronic migraine     Brain aneurysm   Significant for high cholesterol, migraine headaches, depression, iron deficiency      FAMILY HISTORY  Family History   Problem Relation Age of Onset     Migraines Mother      Aneurysm Mother      Melanoma Father      Other - See Comments Daughter         PANDAS     Hashimoto's thyroiditis Daughter      Kidney Cancer Maternal Grandmother 70     Colon Cancer Maternal Grandfather 84     Prostate Cancer Paternal Grandfather 80     Kidney Cancer Paternal Grandfather      Colon Cancer Paternal Grandfather 78     Other - See Comments Son         PANDAS     Gallbladder Disease Maternal Aunt 75     Aneurysm Maternal Uncle         Brain     Colon Cancer Paternal Aunt 50     Melanoma Paternal Aunt      Aneurysm Paternal Aunt         Brain     Melanoma Paternal Aunt 48     Breast Cancer No family hx of      Ovarian Cancer No family hx of    Reviewed and negative for neurological conditions    SOCIAL HISTORY  Social History     Tobacco Use     Smoking status: Never     Smokeless tobacco: Never   Substance Use Topics     Alcohol use: Not Currently     Drug use: Never       SYSTEMS REVIEW  Twelve-system ROS was done and other than  the HPI this was negative.   No new concerns/issues.    MEDICATIONS  acetaminophen (TYLENOL) 500 MG tablet, Take 500 mg by mouth every 6 hours as needed for mild pain  doxylamine (UNISOM) 25 MG TABS tablet, Take 25 mg by mouth nightly as needed  drospirenone-ethinyl estradiol (ADRIÁN) 3-0.03 MG tablet, 4 packs for 3 months, uses active birth control pill for 12 weeks and off for 1 week.  ibuprofen (ADVIL/MOTRIN) 800 MG tablet, Take 800 mg by mouth every 8 hours as needed for moderate pain  melatonin 3 MG tablet, Take 1 mg by mouth nightly as needed for sleep  tretinoin (RETIN-A) 0.1 % external cream,   vitamin D2 (ERGOCALCIFEROL) 01464 units (1250 mcg) capsule, TAKE 1 CAPSULE BY MOUTH WEEKLY    Botulinum Toxin Type A (BOTOX) 200 units injection 155 Units  Botulinum Toxin Type A (BOTOX) 200 units injection 200 Units       PHYSICAL EXAMINATION  VITALS: BP 91/57   Pulse 78   Resp 16   Wt 65.8 kg (145 lb)   BMI 25.89 kg/m    GENERAL: Healthy appearing, alert, no acute distress, normal habitus.  CARDIOVASCULAR: Ext warm and well perfused. Pulses present.   NEUROLOGICAL: Patient is awake and oriented to self, place and time. Attention span is normal. Memory grossly intact. Language is fluent and follows commands appropriately. Appropriate fund of knowledge. Cranial nerves 2-12 are intact. There is no pronator drift. Motor exam shows 5/5 strength in all extremities. Tone is symmetric bilaterally in upper and lower extremities. Finger to nose is without dysmetria. Gait is normal and the patient is able to do tandem walk.  No change in exam today.    DIAGNOSTICS  DSA  Conically-shaped right internal carotid artery superior hypophyseal   aneurysm measures 1.3 mm deep by 1.7 mm x 2.1 mm across. The aneurysm neck   measures up to 2.1 mm in maximum dimension. There is very mild fusiform   ectasia of the paraclinoid segment   giving rise to this more focal saccular aneurysm.    DSA 2020 1. Stable appearance of the 1.3 x 1.7  x 2.1 mm right internal carotid artery superior hypophyseal aneurysm with a 2.1 mm neck with adjacent mild parent vessel ectasia.    2. No evidence of new intracranial aneurysm.    Results were discussed with the patient and conveyed to the neurosurgical team immediately following the procedure.    Evaluation and stenosis determination based on NASCET criteria.    MRI  HEAD MRI:  1.  Negative brain MRI. No acute intracranial finding. No evidence for recent  ischemia, intracranial hemorrhage, or mass.     HEAD MRA:  1.  Approximately 2 mm outpouching in the region of the right carotid cave may  reflect a small aneurysm. This is near the dural rings and could be intradural  in location.  2.  Otherwise negative brain MRA.        RELEVANT LABS  Ferritin 67    MRA 2021  HEAD MRI:   1.  Normal head MRI.     HEAD MRA:   1.  Accounting for differences in modality, no significant change in the size or morphology of a 1.3 x 1.7 x 2.1 mm right internal carotid artery superior hypophyseal aneurysm.   2.  No evidence of new intracranial aneurysm, high-flow vascular malformation or high-grade stenosis.     NECK MRA:  1.  Normal neck MRA.  MRA 2022-exam stable.  HEAD MRI:   1.  No evidence for recent infarction.     2.  No pathologic enhancement.     3.  No change from previous MRI brain evaluation 05/05/2021 as above.     HEAD MRA:   1.  Stable small unruptured right superior hypophyseal aneurysm. No evidence for adjacent hemorrhage or differential appearance of the aneurysm with respect to size/morphology and orientation. No new or progressive enlargement of aneurysms noted. Overall   [place] MRA stable from prior study.     NECK MRA:  1.  No hemodynamic stenosis. Stable MRA from previous evaluation.      IMPRESSION/REPORT/PLAN  Insomnia, unspecified type  Chronic migraine  Cerebral aneurysm, nonruptured-stable  History of blepharoplasty surgery  Neck tightness/neck pain  Question hormonal migraines    This is a 48 year  old female chronic migraines, unrelated cerebral aneurysm, insomnia.  There might be a hormonal component for her headaches.  Imaging studies have been negative in the past.    For prevention of her migraines she is currently on Botox.  Emgality has not helped in the past.  She has tried tizanidine, Flexeril, propanolol in the past.    For  of headaches she is currently on Nurtec and occasionally will use Imitrex tablets/injections depending on severity of the headaches.  Has also tried a combination of Imitrex, Flexeril, propanolol, caffeine in the past.    She is sleeping well at night.  Occasionally will use Flexeril if there is too much stiffness in the neck.  Some and this makes the headaches worse as she oversleeps.    She has a history of right cerebral artery aneurysm.  Previous MRIs have been stable.  We will need a repeat MRA this year.  Needs yearly MRIs.    Her headaches might be hormonal and previously options were discussed with the patient in the past.  She can continue birth control per direction of OB/GYN.  She remains on the Irma.  Stable.    Return in 1 year.  Continue Botox every 3 months.    -     cyclobenzaprine (FLEXERIL) 5 MG tablet; Take 1 tablet (5 mg) by mouth 3 times daily as needed for muscle spasms  -     SUMAtriptan (IMITREX STATDOSE) 6 MG/0.5ML pen injector kit; Inject 0.5 mLs (6 mg) Subcutaneous at onset of headache for migraine  -     SUMAtriptan (IMITREX) 100 MG tablet; Take 1 tablet (100 mg) by mouth at onset of headache for migraine Can repeat in 2 hours if needed  -     rimegepant (NURTEC) 75 MG ODT tablet; Take 1 tablet (75 mg) by mouth as needed (Migraine)  -     MRA Brain ([place]) wo & w Contrast; Future    Return in about 1 year (around 2024) for In-Clinic Visit (must), After testing.    Over 31 minutes were spent coordinating the care for the patient on the day of the encounter.  This includes previsit, during visit and post visit activities as  documented above.  Counseled patient.  Prescription management.  MRI ordered.  Multiple problems reviewed/addressed.  (Activities include but not inclusive of reviewing chart, reviewing outside records, reviewing labs and imaging study results as well as the images, patient visit time including getting history and exam,  use if applicable, review of test results with the patient and coming up with a plan in a shared model, counseling patient and family, education and answering patient questions, EMR , EMR diagnosis entry and problem list management, medication reconciliation and prescription management if applicable, paperwork if applicable, printing documents and documentation of the visit activities.)      King Taylor MD  Neurologist  [facility]  Tel:- [phone]    This note was dictated using voice recognition software.  Any grammatical or context distortions are unintentional and inherent to the software.

## 2023-04-05 NOTE — TELEPHONE ENCOUNTER
QTY - PRIOR AUTHORIZATION DENIED  INS DID APPROVE QTY 8 PER 31 DAYS UNTIL 4/4/2024     Medication: rimegepant (NURTEC) 75 MG ODT tablet - EPA DENIED - PARTIAL APPROVAL    Denial Date: 4/3/2023    Denial Rational: INS does not allow more than 8 tabs per fill unless medis being used as a migraine prophylaxis      Appeal Information:

## 2023-04-06 RX ORDER — RIMEGEPANT SULFATE 75 MG/75MG
75 TABLET, ORALLY DISINTEGRATING ORAL PRN
Qty: 8 TABLET | Refills: 11 | Status: SHIPPED | OUTPATIENT
Start: 2023-04-06 | End: 2023-08-01

## 2023-04-17 ENCOUNTER — HOSPITAL ENCOUNTER (OUTPATIENT)
Dept: MRI IMAGING | Facility: HOSPITAL | Age: 48
Discharge: HOME | End: 2023-04-17
Attending: PSYCHIATRY & NEUROLOGY | Admitting: PSYCHIATRY & NEUROLOGY
Payer: COMMERCIAL

## 2023-04-17 DIAGNOSIS — I67.1 CEREBRAL ANEURYSM, NONRUPTURED: ICD-10-CM

## 2023-04-17 PROCEDURE — A9585 GADOBUTROL INJECTION: HCPCS | Performed by: PSYCHIATRY & NEUROLOGY

## 2023-04-17 PROCEDURE — 70546 MR ANGIOGRAPH HEAD W/O&W/DYE: CPT

## 2023-04-17 PROCEDURE — 255N000002 HC RX 255 OP 636: Performed by: PSYCHIATRY & NEUROLOGY

## 2023-04-17 RX ORDER — GADOBUTROL 604.72 MG/ML
7 INJECTION INTRAVENOUS ONCE
Status: COMPLETED | OUTPATIENT
Start: 2023-04-17 | End: 2023-04-17

## 2023-04-17 RX ADMIN — GADOBUTROL 7 ML: 604.72 INJECTION INTRAVENOUS at 18:32

## 2023-04-28 ENCOUNTER — OFFICE VISIT (OUTPATIENT)
Dept: NEUROLOGY | Facility: CLINIC | Age: 48
End: 2023-04-28
Payer: COMMERCIAL

## 2023-04-28 VITALS
RESPIRATION RATE: 16 BRPM | HEART RATE: 88 BPM | BODY MASS INDEX: 25.89 KG/M2 | SYSTOLIC BLOOD PRESSURE: 106 MMHG | DIASTOLIC BLOOD PRESSURE: 75 MMHG | WEIGHT: 145 LBS

## 2023-04-28 DIAGNOSIS — G43.719 INTRACTABLE CHRONIC MIGRAINE WITHOUT AURA AND WITHOUT STATUS MIGRAINOSUS: Primary | ICD-10-CM

## 2023-04-28 PROCEDURE — 64615 CHEMODENERV MUSC MIGRAINE: CPT | Performed by: PSYCHIATRY & NEUROLOGY

## 2023-04-28 NOTE — PROCEDURES
NEUROLOGY PROCEDURE NOTE  Apr 28, 2023      Chronic migraine Botox injection    CONSENT: The procedure was explained to the patient. The risks and benefits of the procedure and the alternatives were discussed with the patient. The patient was given an opportunity to ask any questions she had about the procedure. A verbal consent was obtained from the patient. A written consent is available in the chart.    PRE-PROCEDURE  Diagnosis: Chronic migraine  Headache frequency before the use of Botox:- 30 headache days per month  Headache frequency with Botox use:-0-1 headache days per month (restarting Botox today)    EXAM  GENERAL: Healthy appearing, alert, no acute distress, normal habitus.  NEUROLOGICAL:  Patient is alert with fluent language.  Extraocular movements are intact.  Facial movements are present and symmetric.  No arm drift.    PROCEDURE SUMMARY:   The following muscles were identified after palpating for landmarks and the overlying skin was cleansed with alcohol. These muscles were then injected using a 30 guage- half  inch needle with the following doses of onabotulinumtoxin A. A 5 unit/ 0.1 ml dilution was used for the injections. A 2 x 100 unit vial was used.    1. Corrugators 5 units each side (not done).  2. Procerus 5 units (not done).  3. Frontalis 10 units each side (not done).  4. Temporalis 20 units each side.  5. Occipitalis 15 units each side.  6. Paraspinals 15 units each side.  7. Trapezius 25 units each side.    Patient has had a blepharoplasty and will hold off injecting in the forehead.    Units Injected: 150 Units  Units Discarded: 50 Units  Lot Number and Expiration: [id number]; 09/2025    The patient tolerated the procedure without any immediate complaints and will call the Neurology clinic if she has any problems or side effects from the medication. The patient will follow up in the Neurology clinic as previously decided.      King Taylor MD  Neurologist  [facility]  [facility]  [phone]

## 2023-04-28 NOTE — LETTER
4/28/2023         RE: Shyann Jurado  [address] [place] [id number]        Dear Colleague,    Thank you for referring your patient, Shyann Jurado, to the [facility] [place]. Please see a copy of my visit note below.    See procedure note.       Again, thank you for allowing me to participate in the care of your patient.        Sincerely,        King Taylor MD

## 2023-06-14 NOTE — H&P
"HISTORY OF PRESENT ILLNESS:  The patient is a 48-year-old  female who re-presents to my professional services at the [facility].  She relates back in 2016 that she had a neuroma removed from her 3rd interdigital space of her left foot by Dr. Beverly.   \"Prior to the removal, I had a cortisone injection or several cortisone injections, but it was ultimately removed back in 2016.  I was really good up to about 2 years after the surgery, but a lot of the symptoms started coming back.  When I was walking, I would feel a sharp stabbing pain in the ball of my foot.  A lot of times it felt like this burning sensation.  It wasn't daily, but it's getting more frequently as the years go by, now at this point in time.\"  I did a repeat injection for her on 2023 and this helped, but it did not help her for very long.  Therefore, she is requesting surgical intervention at this point in time.    PAST MEDICAL HISTORY:  Unremarkable aside of a history of a brain aneurysm, in which she has recently been diagnosed with Lyme disease.  She has taken antibiotics for the Lyme disease.  Otherwise, she denies heart, liver, kidney diseases, bleeding problems, gastritis, gastric or duodenal ulcerations, recurrent urinary tract or respiratory infections, emphysema, bronchitis, high blood pressure, or diabetes.    CURRENT MEDICATIONS:   Erythromycin for the Lyme disease and birth control.    ALLERGIES AND SENSITIVITIES:  ARE UNAVAILABLE TO ME AT THE TIME OF THIS DICTATION.    FAMILY HISTORY:  Elevated cholesterol, colon cancer and aneurysms.    SOCIAL HISTORY:  She denies smoking currently or in the past, and she relates drinking alcohol rarely.    PAST SURGICAL HISTORY:  Included:    1.  A neuroma removal in 2016, left foot.  2.  .  3.  Pelvic floor surgery.  4.  Finger tendon surgery.    EXAMINATION EXTREMITIES:  DERMATOLOGIC:  The temperature, texture and turgor is within normal limits.  " Her 2nd digit of the left foot demonstrates mild mycotic changes.  Otherwise, all others are good bilaterally.  Hair growth is good into her toes, bilaterally.  There is a well-healed incision over the 3rd interdigital space of the left foot.  She presents without any corns or calluses or other lesions, bilaterally.  VASCULAR:  Dorsalis pedis pulses are both +2/4, bilaterally.  Whereas the posterior tibial pulses are both +3/4, bilaterally.  Her capillary filling time is less than 3 seconds, bilaterally.  There are no gross varicose veins or varicosities, bilaterally.  NEUROLOGIC:  The lower extremity neurologic examination is grossly intact.  MUSCULOSKELETAL:  The patient presents without orthopedic deformities of both feet; however, she does have pain with palpation of the plantar aspect of the 3rd interdigital space, left foot.    PREOPERATIVE DIAGNOSIS:   Neuroma, third intermetatarsal space, left foot.    PROPOSED PROCEDURE:  Resection of neuroma, third intermetatarsal space, left foot.    DISCLAIMER:  The nature and extent of the surgery and possible complications were all explained to her at her full preoperative consultation.  This full preoperative consultation was provided utilizing drawings and sketches.  No guarantee was given as far as any possible functional or cosmetic results.  Various inherent risks and complications, which were discussed, include infection, numbness, and she realizes this is inherent to the nature of the procedure, loss of power or frailness to any toe or toes, reactions to medications, loss of power to any given toes, and something unforeseen, regeneration of the neuromas are also risks and complications.  The nature and extent of surgery and possible complications were all explained to her.  My office consent was signed.  We are premedicating her with preoperative antibiotics pending her stoppage of the current erythromycin.     Elver Trimble DPM        D: 06/14/2023   T:  2023   MT: [email or web address]    Name:     ELIZABETH ROJAS  MRN:      [id number]        Account:     [id number]   :      1975       Document: [id number]

## 2023-06-19 ENCOUNTER — ANESTHESIA EVENT (OUTPATIENT)
Dept: SURGERY | Facility: AMBULATORY SURGERY CENTER | Age: 48
End: 2023-06-19
Payer: COMMERCIAL

## 2023-06-20 ENCOUNTER — HOSPITAL ENCOUNTER (OUTPATIENT)
Facility: AMBULATORY SURGERY CENTER | Age: 48
Discharge: HOME | End: 2023-06-20
Attending: PODIATRIST
Payer: COMMERCIAL

## 2023-06-20 ENCOUNTER — ANESTHESIA (OUTPATIENT)
Dept: SURGERY | Facility: AMBULATORY SURGERY CENTER | Age: 48
End: 2023-06-20
Payer: COMMERCIAL

## 2023-06-20 VITALS
TEMPERATURE: 97.6 F | RESPIRATION RATE: 16 BRPM | HEIGHT: 63 IN | OXYGEN SATURATION: 98 % | SYSTOLIC BLOOD PRESSURE: 109 MMHG | WEIGHT: 142 LBS | BODY MASS INDEX: 25.16 KG/M2 | HEART RATE: 84 BPM | DIASTOLIC BLOOD PRESSURE: 71 MMHG

## 2023-06-20 DIAGNOSIS — G57.82 NEUROMA OF THIRD INTERSPACE OF LEFT FOOT: ICD-10-CM

## 2023-06-20 DIAGNOSIS — G57.82 INTERDIGITAL NEUROMA OF LEFT FOOT: ICD-10-CM

## 2023-06-20 RX ORDER — ONDANSETRON 4 MG/1
4 TABLET, ORALLY DISINTEGRATING ORAL EVERY 30 MIN PRN
Status: DISCONTINUED | OUTPATIENT
Start: 2023-06-20 | End: 2023-06-21 | Stop reason: HOSPADM

## 2023-06-20 RX ORDER — OXYCODONE HYDROCHLORIDE 5 MG/1
5 TABLET ORAL
Status: DISCONTINUED | OUTPATIENT
Start: 2023-06-20 | End: 2023-06-21 | Stop reason: HOSPADM

## 2023-06-20 RX ORDER — ONDANSETRON 2 MG/ML
INJECTION INTRAMUSCULAR; INTRAVENOUS PRN
Status: DISCONTINUED | OUTPATIENT
Start: 2023-06-20 | End: 2023-06-20

## 2023-06-20 RX ORDER — OXYCODONE HYDROCHLORIDE 10 MG/1
10 TABLET ORAL
Status: DISCONTINUED | OUTPATIENT
Start: 2023-06-20 | End: 2023-06-21 | Stop reason: HOSPADM

## 2023-06-20 RX ORDER — GLYCOPYRROLATE 0.2 MG/ML
INJECTION, SOLUTION INTRAMUSCULAR; INTRAVENOUS PRN
Status: DISCONTINUED | OUTPATIENT
Start: 2023-06-20 | End: 2023-06-20

## 2023-06-20 RX ORDER — LIDOCAINE HYDROCHLORIDE 20 MG/ML
INJECTION, SOLUTION INFILTRATION; PERINEURAL PRN
Status: DISCONTINUED | OUTPATIENT
Start: 2023-06-20 | End: 2023-06-20

## 2023-06-20 RX ORDER — DEXAMETHASONE SODIUM PHOSPHATE 4 MG/ML
INJECTION, SOLUTION INTRA-ARTICULAR; INTRALESIONAL; INTRAMUSCULAR; INTRAVENOUS; SOFT TISSUE PRN
Status: DISCONTINUED | OUTPATIENT
Start: 2023-06-20 | End: 2023-06-20

## 2023-06-20 RX ORDER — CEFAZOLIN SODIUM 2 G/100ML
INJECTION, SOLUTION INTRAVENOUS PRN
Status: DISCONTINUED | OUTPATIENT
Start: 2023-06-20 | End: 2023-06-20

## 2023-06-20 RX ORDER — PROPOFOL 10 MG/ML
INJECTION, EMULSION INTRAVENOUS PRN
Status: DISCONTINUED | OUTPATIENT
Start: 2023-06-20 | End: 2023-06-20

## 2023-06-20 RX ORDER — CEFAZOLIN SODIUM 2 G/100ML
2 INJECTION, SOLUTION INTRAVENOUS
Status: CANCELLED | OUTPATIENT
Start: 2023-06-20

## 2023-06-20 RX ORDER — LIDOCAINE HYDROCHLORIDE 10 MG/ML
INJECTION, SOLUTION EPIDURAL; INFILTRATION; INTRACAUDAL; PERINEURAL PRN
Status: DISCONTINUED | OUTPATIENT
Start: 2023-06-20 | End: 2023-06-20 | Stop reason: HOSPADM

## 2023-06-20 RX ORDER — ONDANSETRON 2 MG/ML
4 INJECTION INTRAMUSCULAR; INTRAVENOUS EVERY 30 MIN PRN
Status: DISCONTINUED | OUTPATIENT
Start: 2023-06-20 | End: 2023-06-21 | Stop reason: HOSPADM

## 2023-06-20 RX ORDER — PROPOFOL 10 MG/ML
INJECTION, EMULSION INTRAVENOUS CONTINUOUS PRN
Status: DISCONTINUED | OUTPATIENT
Start: 2023-06-20 | End: 2023-06-20

## 2023-06-20 RX ORDER — LIDOCAINE 40 MG/G
CREAM TOPICAL
Status: DISCONTINUED | OUTPATIENT
Start: 2023-06-20 | End: 2023-06-21 | Stop reason: HOSPADM

## 2023-06-20 RX ORDER — SODIUM CHLORIDE, SODIUM LACTATE, POTASSIUM CHLORIDE, CALCIUM CHLORIDE 600; 310; 30; 20 MG/100ML; MG/100ML; MG/100ML; MG/100ML
INJECTION, SOLUTION INTRAVENOUS CONTINUOUS
Status: DISCONTINUED | OUTPATIENT
Start: 2023-06-20 | End: 2023-06-21 | Stop reason: HOSPADM

## 2023-06-20 RX ORDER — BUPIVACAINE HYDROCHLORIDE 5 MG/ML
INJECTION, SOLUTION PERINEURAL PRN
Status: DISCONTINUED | OUTPATIENT
Start: 2023-06-20 | End: 2023-06-20 | Stop reason: HOSPADM

## 2023-06-20 RX ORDER — FENTANYL CITRATE 50 UG/ML
INJECTION, SOLUTION INTRAMUSCULAR; INTRAVENOUS PRN
Status: DISCONTINUED | OUTPATIENT
Start: 2023-06-20 | End: 2023-06-20

## 2023-06-20 RX ORDER — MAGNESIUM HYDROXIDE 1200 MG/15ML
LIQUID ORAL PRN
Status: DISCONTINUED | OUTPATIENT
Start: 2023-06-20 | End: 2023-06-20 | Stop reason: HOSPADM

## 2023-06-20 RX ORDER — ACETAMINOPHEN 325 MG/1
975 TABLET ORAL ONCE
Status: COMPLETED | OUTPATIENT
Start: 2023-06-20 | End: 2023-06-20

## 2023-06-20 RX ADMIN — GLYCOPYRROLATE 0.2 MG: 0.2 INJECTION, SOLUTION INTRAMUSCULAR; INTRAVENOUS at 08:23

## 2023-06-20 RX ADMIN — ACETAMINOPHEN 975 MG: 325 TABLET ORAL at 07:56

## 2023-06-20 RX ADMIN — Medication 100 MCG: at 08:48

## 2023-06-20 RX ADMIN — SODIUM CHLORIDE, SODIUM LACTATE, POTASSIUM CHLORIDE, CALCIUM CHLORIDE: 600; 310; 30; 20 INJECTION, SOLUTION INTRAVENOUS at 08:17

## 2023-06-20 RX ADMIN — PROPOFOL 180 MCG/KG/MIN: 10 INJECTION, EMULSION INTRAVENOUS at 08:23

## 2023-06-20 RX ADMIN — Medication 50 MCG: at 08:54

## 2023-06-20 RX ADMIN — LIDOCAINE HYDROCHLORIDE 40 MG: 20 INJECTION, SOLUTION INFILTRATION; PERINEURAL at 08:23

## 2023-06-20 RX ADMIN — CEFAZOLIN SODIUM 2 G: 2 INJECTION, SOLUTION INTRAVENOUS at 08:33

## 2023-06-20 RX ADMIN — Medication 50 MCG: at 08:59

## 2023-06-20 RX ADMIN — FENTANYL CITRATE 25 MCG: 50 INJECTION, SOLUTION INTRAMUSCULAR; INTRAVENOUS at 08:46

## 2023-06-20 RX ADMIN — PROPOFOL 50 MG: 10 INJECTION, EMULSION INTRAVENOUS at 08:23

## 2023-06-20 RX ADMIN — FENTANYL CITRATE 25 MCG: 50 INJECTION, SOLUTION INTRAMUSCULAR; INTRAVENOUS at 08:23

## 2023-06-20 RX ADMIN — DEXAMETHASONE SODIUM PHOSPHATE 4 MG: 4 INJECTION, SOLUTION INTRA-ARTICULAR; INTRALESIONAL; INTRAMUSCULAR; INTRAVENOUS; SOFT TISSUE at 08:29

## 2023-06-20 RX ADMIN — ONDANSETRON 4 MG: 2 INJECTION INTRAMUSCULAR; INTRAVENOUS at 08:23

## 2023-06-20 RX ADMIN — FENTANYL CITRATE 25 MCG: 50 INJECTION, SOLUTION INTRAMUSCULAR; INTRAVENOUS at 08:26

## 2023-06-20 RX ADMIN — FENTANYL CITRATE 25 MCG: 50 INJECTION, SOLUTION INTRAMUSCULAR; INTRAVENOUS at 08:39

## 2023-06-20 NOTE — ANESTHESIA POSTPROCEDURE EVALUATION
Patient: Shyann Jurado    Procedure: Procedure(s):  EXCISION NEUROMA 3RD INTERMETATARSAL SPACE LEFT       Anesthesia Type:  MAC    Note:  Disposition: Outpatient   Postop Pain Control: Uneventful            Sign Out: Well controlled pain   PONV: No   Neuro/Psych: Uneventful            Sign Out: Acceptable/Baseline neuro status   Airway/Respiratory: Uneventful            Sign Out: Acceptable/Baseline resp. status   CV/Hemodynamics: Uneventful            Sign Out: Acceptable CV status; No obvious hypovolemia; No obvious fluid overload   Other NRE: NONE   DID A NON-ROUTINE EVENT OCCUR? No           Last vitals:  Vitals Value Taken Time   /71 06/20/23 0930   Temp 97.6  F (36.4  C) 06/20/23 0911   Pulse 81 06/20/23 0931   Resp 16 06/20/23 0930   SpO2 98 % 06/20/23 0931   Vitals shown include unvalidated device data.    Electronically Signed By: Jabari Couch MD  June 20, 2023  10:29 AM

## 2023-06-20 NOTE — ANESTHESIA PREPROCEDURE EVALUATION
Anesthesia Pre-Procedure Evaluation    Patient: Shyann Jurado   MRN: [id number] : 1975        Procedure : Procedure(s):  EXCISION NEUROMA 3RD INTERMETATARSAL SPACE LEFT          Past Medical History:   Diagnosis Date     Anemia      Brain aneurysm      Chronic headaches      Colon polyps      Fatigue      Iron deficiency anemia      Ptosis of eyelid, bilateral      Vitamin D deficiency       Past Surgical History:   Procedure Laterality Date     ABDOMEN SURGERY           BLEPHAROPLASTY, BROW LIFT BILATERAL, COMBINED Bilateral 3/2/2021    Procedure: BILATERAL UPPER LID BLEPHAROPLASTY WITH RIGHT INTERNAL PTOSIS  AND BILATERAL BROWPLASTY;  Surgeon: Chayo Peña MD;  Location: [facility]     C RECTUM SURGERY PROCEDURE UNLISTED  , 2007    4th degree laceration from , repeat procedure for repair     GI SURGERY      Pelvic Floor Surgery     HEAD & NECK SURGERY      [place] Teeth Extraction     IR CEREBRAL ANGIOGRAM  2019     IR CEREBRAL ANGIOGRAM  2020     IR CEREBRAL ANGIOGRAM  2019     IR CEREBRAL ANGIOGRAM  2020     ORTHOPEDIC SURGERY      righ 1st finger tendon repair, Excision Allen's Neuroma Left Foot     OTHER SURGICAL HISTORY Left     ALLEN'S NEUROMA     REPAIR TENDON FINGER(S)  1991     ZZC  DELIVERY ONLY       ZZHC COLONOSCOPY W/WO BRUSH/WASH       abd pain; normal per pt      Allergies   Allergen Reactions     Erythromycin Nausea and Vomiting and Other (See Comments)     Comment: Stomach Pain, Description:        Topiramate      Hair loss      Social History     Tobacco Use     Smoking status: Never     Smokeless tobacco: Never   Vaping Use     Vaping status: Not on file   Substance Use Topics     Alcohol use: Not Currently      Wt Readings from Last 1 Encounters:   23 64.4 kg (142 lb)        Anesthesia Evaluation            ROS/MED HX  ENT/Pulmonary:  - neg pulmonary ROS     Neurologic: Comment: Brain aneurysm    (+) migraines,      Cardiovascular:  - neg cardiovascular ROS     METS/Exercise Tolerance: >4 METS    Hematologic:  - neg hematologic  ROS     Musculoskeletal:  - neg musculoskeletal ROS     GI/Hepatic:  - neg GI/hepatic ROS     Renal/Genitourinary:  - neg Renal ROS     Endo:     (+) thyroid problem, hypothyroidism,     Psychiatric/Substance Use:  - neg psychiatric ROS     Infectious Disease:  - neg infectious disease ROS     Malignancy:  - neg malignancy ROS     Other:  - neg other ROS          Physical Exam    Airway  airway exam normal      Mallampati: II       Respiratory Devices and Support         Dental           Cardiovascular   cardiovascular exam normal       Rhythm and rate: regular and normal     Pulmonary   pulmonary exam normal        breath sounds clear to auscultation           OUTSIDE LABS:  CBC:   Lab Results   Component Value Date    WBC 8.6 09/07/2022    WBC 8.6 04/28/2022    HGB 12.1 09/07/2022    HGB 12.1 04/28/2022    HCT 36.7 09/07/2022    HCT 39.0 04/28/2022     09/07/2022     04/28/2022     BMP:   Lab Results   Component Value Date     09/07/2022     05/19/2021    POTASSIUM 4.7 09/07/2022    POTASSIUM 4.1 05/19/2021    CHLORIDE 104 09/07/2022    CHLORIDE 104 05/19/2021    CO2 25 09/07/2022    CO2 22 05/19/2021    BUN 10.8 09/07/2022    BUN 14 05/19/2021    CR 0.69 09/07/2022    CR 0.79 05/19/2021    GLC 98 09/07/2022    GLC 72 05/19/2021     COAGS: No results found for: PTT, INR, FIBR  POC:   Lab Results   Component Value Date    HCG Negative 03/02/2021     HEPATIC:   Lab Results   Component Value Date    ALBUMIN 4.1 09/07/2022    PROTTOTAL 7.0 09/07/2022    ALT 14 09/07/2022    AST 25 09/07/2022    ALKPHOS 74 09/07/2022    BILITOTAL 0.2 09/07/2022     OTHER:   Lab Results   Component Value Date    DEMETRIUS 9.3 09/07/2022    TSH 3.94 09/07/2022       Anesthesia Plan    ASA Status:  2      Anesthesia Type: MAC.   Induction: Intravenous.   Maintenance: TIVA.        Consents    Anesthesia  Plan(s) and associated risks, benefits, and realistic alternatives discussed. Questions answered and patient/representative(s) expressed understanding.    - Discussed:     - Discussed with:  Patient      - Extended Intubation/Ventilatory Support Discussed: No.      - Patient is DNR/DNI Status: No    Use of blood products discussed: No .     Postoperative Care    Pain management: IV analgesics, Oral pain medications.   PONV prophylaxis: Ondansetron (or other 5HT-3), Dexamethasone or Solumedrol     Comments:    Other Comments: The patient understands and accepts the risks of MAC anesthesia including (but not limited to) nausea, vomiting, dizziness, and chipped teeth. I also discussed the possibility of conversion to GAETT/GALMA anesthesia which include intra op recall, hoarse voice, sore throat, and pinched lip or chipped teeth.  Versed/fent  propofol ggt  Decadron/zofran            Jabari Couch MD

## 2023-06-20 NOTE — OP NOTE
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      PROCEDURE:  Excision of stump neuroma, 3rd intermetatarsal space, left foot.    DESCRIPTION OF PROCEDURE:  On 06/20/2023, the patient was escorted per cart and placed in the supine position.  There, IV sedation was instituted.  Local anesthesia was obtained utilizing 15 mL approximately of 1% Xylocaine plain, which was a course injected locally.  Hemostasis was achieved and maintained utilizing a pneumatic ankle tourniquet, elevated to 230 mmHg following elevation of the left limb for 3 minutes.    PROCEDURE #1  1.  Excision of stump neuroma, 3rd interdigital space, left foot.  Attention was directed to the dorsum of the left foot where an approximately 5 cm linear incision was fashioned between the shaft of the 3rd and 4th metatarsals.  This extended from the cleft of the toe proximally.  This was deepened via both blunt and sharp dissection.  All vital structures were noted and retracted from within the surgical wound.  Very little fibrosis or scarring was noted or appreciated dorsally.  As dissection continued deeply, neuromatous tissue was noted between the 3rd and 4th metatarsophalangeal joints rather distally.  No remnants of the transverse and metatarsal ligaments were noted or appreciated.  Dissection continued deeply to explore for the proper digital branches to respective sides of the 3rd and 4th toes.  These were not ever visualized or encountered.  Literally, a neuromatous mass was noted distally within that of the 3rd intermetatarsal space.  This was dissected from accompanying tissue distally to proximally.  Again, no remnants of the transverse metatarsal ligament was noted or appreciated.  A rather large 4 cm neuromatous lesion was removed in toto.  The wound was then flushed with copious amounts of saline, and no abnormal  tissue was noted or appreciated.  Another attempt to find remnants of the transverse metatarsal ligament, and none was found, and again no proper digital branches into the respective sides of the 3rd and 4th toes were ever appreciated.  The surgical wound was then closed utilizing 4-0 and 5-0 Vicryl.  A postoperative injection of 9:1 mixture of 0.5% Marcaine mixed with Kenalog or Celestone was then given.  Approximately 9 mL were given.  Surgical dressing was applied.  The pneumatic ankle tourniquet was released.  Good vascular return noted to all digits.  There were no complications.  Specimen was saved for both gross and microscopic examination.  The patient was escorted to the recovery area in an apparent satisfactory condition with all vital signs stable.      Elver Trimble DPM        D: 2023   T: 2023   MT: martha    Name:     ELIZABETH ROJAS  MRN:      [id number]        Account:        [id number]   :      1975           Procedure Date: 2023     Document: [id number]

## 2023-06-20 NOTE — ANESTHESIA CARE TRANSFER NOTE
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    Anesthesia Type:   MAC     Note:    Oropharynx: oropharynx clear of all foreign objects and spontaneously breathing  Level of Consciousness: awake  Oxygen Supplementation: room air    Independent Airway: airway patency satisfactory and stable  Dentition: dentition unchanged  Vital Signs Stable: post-procedure vital signs reviewed and stable  Report to RN Given: handoff report given  Patient transferred to: Phase II    Handoff Report: Identifed the Patient, Identified the Reponsible Provider, Reviewed the pertinent medical history, Discussed the surgical course, Reviewed Intra-OP anesthesia mangement and issues during anesthesia, Set expectations for post-procedure period and Allowed opportunity for questions and acknowledgement of understanding      Vitals:  Vitals Value Taken Time   BP 99/58 06/20/23 0911   Temp 97.6  F (36.4  C) 06/20/23 0911   Pulse 89 06/20/23 0911   Resp 16 06/20/23 0911   SpO2 97 % 06/20/23 0911       Electronically Signed By: MARINA Shearer CRNA  June 20, 2023  9:14 AM

## 2023-06-20 NOTE — DISCHARGE INSTRUCTIONS
You have received 975 mg of Acetaminophen (Tylenol) at 8 AM. Please do not take an additional dose of Tylenol until after 2 PM     Do not exceed 4,000 mg of acetaminophen during a 24 hour period and keep in mind that acetaminophen can also be found in many over-the-counter cold medications as well as narcotics that may be given for pain.      If you have any questions or concerns regarding your procedure, please contact Dr. Trimble, his office number is [phone].

## 2023-06-29 ENCOUNTER — TELEPHONE (OUTPATIENT)
Dept: NEUROLOGY | Facility: CLINIC | Age: 48
End: 2023-06-29
Payer: COMMERCIAL

## 2023-06-29 DIAGNOSIS — G43.719 INTRACTABLE CHRONIC MIGRAINE WITHOUT AURA AND WITHOUT STATUS MIGRAINOSUS: Primary | ICD-10-CM

## 2023-06-29 NOTE — TELEPHONE ENCOUNTER
Health Call Center    Phone Message    May a detailed message be left on voicemail: yes     Reason for Call: Symptoms or Concerns     If patient has red-flag symptoms, warm transfer to triage line    Current symptom or concern: Headache/migraine    Symptoms have been present for:  1 month(s)    Has patient previously been seen for this? Yes    By : Dr. Taylor    Date: 06/29/23     Are there any new or worsening symptoms? Yes: Pt states she been getting migraines for the last month and medications have not worked. Pt states she said there getting more intense.    Please call Pt back at [phone] to advise.      Action Taken: Message routed to:  Other: [facility]    Travel Screening: Not Applicable

## 2023-06-30 NOTE — TELEPHONE ENCOUNTER
Weather changes and altitude changes seem to be contributing to increased number of migraines. More frequent around menstrual cycle also. Last botox 4/28.     She is traveling more this summer, then what would be usual.      Imitrex PO and imitrex injections have not been effective in reducing migraine pain for past month or so. Waking up with migraines that last about 2 days or so. Pt feels that tension related migraines have improved with botox.    Pt asking about Vyepti which is an infusion migraine preventative. Would you consider this as a treatment option?     Ted Marie, RN, BSN  [facility]

## 2023-07-05 ENCOUNTER — TELEPHONE (OUTPATIENT)
Dept: NEUROLOGY | Facility: CLINIC | Age: 48
End: 2023-07-05
Payer: COMMERCIAL

## 2023-07-05 DIAGNOSIS — G43.719 INTRACTABLE CHRONIC MIGRAINE WITHOUT AURA AND WITHOUT STATUS MIGRAINOSUS: Primary | ICD-10-CM

## 2023-07-05 RX ORDER — EPTINEZUMAB-JJMR 100 MG/ML
100 INJECTION INTRAVENOUS
Qty: 1 ML | Refills: 0 | Status: SHIPPED | OUTPATIENT
Start: 2023-07-05 | End: 2023-07-18

## 2023-07-05 NOTE — TELEPHONE ENCOUNTER
Called and spoke with patient. She understands that she will have to stop the botox to do the Vyepti. She would like to try this without an appointment if possible.   Are you willing to order to try this?

## 2023-07-05 NOTE — TELEPHONE ENCOUNTER
Prior Authorization Retail Medication Request    Medication/Dose: eptinezumab-jjmr (VYEPTI) 100 MG/ML  ICD code (if different than what is on RX):    Previously Tried and Failed:    Rationale:      Insurance Name:    Insurance ID:        Pharmacy Information (if different than what is on RX)  Name:    Phone:

## 2023-07-05 NOTE — TELEPHONE ENCOUNTER
Could she make an appointment to discuss options.  The Vyepti might not be approved by the insurance with the Botox at the same time.

## 2023-07-06 ENCOUNTER — MYC MEDICAL ADVICE (OUTPATIENT)
Dept: NEUROLOGY | Facility: CLINIC | Age: 48
End: 2023-07-06
Payer: COMMERCIAL

## 2023-07-07 RX ORDER — HEPARIN SODIUM (PORCINE) LOCK FLUSH IV SOLN 100 UNIT/ML 100 UNIT/ML
5 SOLUTION INTRAVENOUS
Status: CANCELLED | OUTPATIENT
Start: 2023-08-01

## 2023-07-07 RX ORDER — MEPERIDINE HYDROCHLORIDE 25 MG/ML
25 INJECTION INTRAMUSCULAR; INTRAVENOUS; SUBCUTANEOUS EVERY 30 MIN PRN
Status: CANCELLED | OUTPATIENT
Start: 2023-08-01

## 2023-07-07 RX ORDER — EPINEPHRINE 1 MG/ML
0.3 INJECTION, SOLUTION, CONCENTRATE INTRAVENOUS EVERY 5 MIN PRN
Status: CANCELLED | OUTPATIENT
Start: 2023-08-01

## 2023-07-07 RX ORDER — ALBUTEROL SULFATE 0.83 MG/ML
2.5 SOLUTION RESPIRATORY (INHALATION)
Status: CANCELLED | OUTPATIENT
Start: 2023-08-01

## 2023-07-07 RX ORDER — ALBUTEROL SULFATE 90 UG/1
1-2 AEROSOL, METERED RESPIRATORY (INHALATION)
Status: CANCELLED
Start: 2023-08-01

## 2023-07-07 RX ORDER — DIPHENHYDRAMINE HYDROCHLORIDE 50 MG/ML
50 INJECTION INTRAMUSCULAR; INTRAVENOUS
Status: CANCELLED
Start: 2023-08-01

## 2023-07-07 RX ORDER — METHYLPREDNISOLONE SODIUM SUCCINATE 125 MG/2ML
125 INJECTION, POWDER, LYOPHILIZED, FOR SOLUTION INTRAMUSCULAR; INTRAVENOUS
Status: CANCELLED
Start: 2023-08-01

## 2023-07-07 RX ORDER — HEPARIN SODIUM,PORCINE 10 UNIT/ML
5-20 VIAL (ML) INTRAVENOUS DAILY PRN
Status: CANCELLED | OUTPATIENT
Start: 2023-08-01

## 2023-07-10 NOTE — TELEPHONE ENCOUNTER
Please let patient know that she would need to meet to document that the Botox is not working and try other medications listed below.

## 2023-07-11 ENCOUNTER — TELEPHONE (OUTPATIENT)
Dept: NEUROLOGY | Facility: CLINIC | Age: 48
End: 2023-07-11
Payer: COMMERCIAL

## 2023-07-11 NOTE — TELEPHONE ENCOUNTER
As mentioned before the insurance most likely will not approve the Botox with the newer medications like Aimovig.  We would need to stop the Botox.    Also the insurance is requiring that we document that the Botox is not effective for you before they approve other medications.  This will require a clinic visit.

## 2023-07-11 NOTE — TELEPHONE ENCOUNTER
Health Call Center    Phone Message    May a detailed message be left on voicemail: yes     Reason for Call: Other: Phill from the [facility] called stated that they needed a prior authorization for the eptinezumab-jjmr (VYEPTI) 100 MG/ML. She requested that  calls the [facility] at [phone] for that prior authorization. Please relay the servicing pharmacy as well as the tax id# [id number] when calling.     Action Taken: Message routed to:  Other: [facility]     Travel Screening: Not Applicable

## 2023-07-18 ENCOUNTER — E-VISIT (OUTPATIENT)
Dept: FAMILY MEDICINE | Facility: CLINIC | Age: 48
End: 2023-07-18
Payer: COMMERCIAL

## 2023-07-18 DIAGNOSIS — M54.9 BACK PAIN, UNSPECIFIED BACK LOCATION, UNSPECIFIED BACK PAIN LATERALITY, UNSPECIFIED CHRONICITY: Primary | ICD-10-CM

## 2023-07-18 PROCEDURE — 99422 OL DIG E/M SVC 11-20 MIN: CPT | Performed by: FAMILY MEDICINE

## 2023-07-18 NOTE — PATIENT INSTRUCTIONS
Thank you for choosing us for your care. Given your symptoms, I would like you to do a lab-only visit to determine what is causing them.  I have placed the orders.  Please schedule an appointment with [place], or call [phone].  I will let you know when the results are back and next steps to take.

## 2023-07-19 ENCOUNTER — LAB (OUTPATIENT)
Dept: LAB | Facility: CLINIC | Age: 48
End: 2023-07-19
Payer: COMMERCIAL

## 2023-07-19 DIAGNOSIS — M54.9 BACK PAIN, UNSPECIFIED BACK LOCATION, UNSPECIFIED BACK PAIN LATERALITY, UNSPECIFIED CHRONICITY: ICD-10-CM

## 2023-07-19 LAB
ALBUMIN UR-MCNC: NEGATIVE MG/DL
APPEARANCE UR: CLEAR
BACTERIA #/AREA URNS HPF: ABNORMAL /HPF
BILIRUB UR QL STRIP: NEGATIVE
COLOR UR AUTO: YELLOW
GLUCOSE UR STRIP-MCNC: NEGATIVE MG/DL
HGB UR QL STRIP: ABNORMAL
KETONES UR STRIP-MCNC: NEGATIVE MG/DL
LEUKOCYTE ESTERASE UR QL STRIP: ABNORMAL
NITRATE UR QL: NEGATIVE
PH UR STRIP: 6 [PH] (ref 5–8)
RBC #/AREA URNS AUTO: ABNORMAL /HPF
SP GR UR STRIP: <=1.005 (ref 1–1.03)
SQUAMOUS #/AREA URNS AUTO: ABNORMAL /LPF
UROBILINOGEN UR STRIP-ACNC: 0.2 E.U./DL
WBC #/AREA URNS AUTO: ABNORMAL /HPF

## 2023-07-19 PROCEDURE — 81001 URINALYSIS AUTO W/SCOPE: CPT | Performed by: FAMILY MEDICINE

## 2023-07-19 PROCEDURE — 87086 URINE CULTURE/COLONY COUNT: CPT

## 2023-07-20 DIAGNOSIS — M54.9 BACK PAIN, UNSPECIFIED BACK LOCATION, UNSPECIFIED BACK PAIN LATERALITY, UNSPECIFIED CHRONICITY: Primary | ICD-10-CM

## 2023-07-21 LAB — BACTERIA UR CULT: NORMAL

## 2023-07-21 NOTE — ADDENDUM NOTE
Addended by: RAMIREZ CRENSHAW on: 7/20/2023 08:34 PM     Modules accepted: Level of Service, SmartSet

## 2023-07-26 ENCOUNTER — HOSPITAL ENCOUNTER (OUTPATIENT)
Dept: ULTRASOUND IMAGING | Facility: HOSPITAL | Age: 48
Discharge: HOME | End: 2023-07-26
Attending: FAMILY MEDICINE | Admitting: FAMILY MEDICINE
Payer: COMMERCIAL

## 2023-07-26 DIAGNOSIS — M54.9 BACK PAIN, UNSPECIFIED BACK LOCATION, UNSPECIFIED BACK PAIN LATERALITY, UNSPECIFIED CHRONICITY: ICD-10-CM

## 2023-07-26 PROCEDURE — 76770 US EXAM ABDO BACK WALL COMP: CPT

## 2023-08-01 ENCOUNTER — OFFICE VISIT (OUTPATIENT)
Dept: NEUROLOGY | Facility: CLINIC | Age: 48
End: 2023-08-01
Payer: COMMERCIAL

## 2023-08-01 ENCOUNTER — TELEPHONE (OUTPATIENT)
Dept: NEUROLOGY | Facility: CLINIC | Age: 48
End: 2023-08-01

## 2023-08-01 VITALS
BODY MASS INDEX: 25.15 KG/M2 | RESPIRATION RATE: 16 BRPM | WEIGHT: 142 LBS | HEART RATE: 80 BPM | DIASTOLIC BLOOD PRESSURE: 79 MMHG | SYSTOLIC BLOOD PRESSURE: 119 MMHG

## 2023-08-01 DIAGNOSIS — I67.1 CEREBRAL ANEURYSM, NONRUPTURED: ICD-10-CM

## 2023-08-01 DIAGNOSIS — G43.719 INTRACTABLE CHRONIC MIGRAINE WITHOUT AURA AND WITHOUT STATUS MIGRAINOSUS: Primary | ICD-10-CM

## 2023-08-01 DIAGNOSIS — M54.2 NECK PAIN: ICD-10-CM

## 2023-08-01 DIAGNOSIS — G47.00 INSOMNIA, UNSPECIFIED TYPE: ICD-10-CM

## 2023-08-01 DIAGNOSIS — G43.719 INTRACTABLE CHRONIC MIGRAINE WITHOUT AURA AND WITHOUT STATUS MIGRAINOSUS: ICD-10-CM

## 2023-08-01 PROCEDURE — 64615 CHEMODENERV MUSC MIGRAINE: CPT | Performed by: PSYCHIATRY & NEUROLOGY

## 2023-08-01 PROCEDURE — 99214 OFFICE O/P EST MOD 30 MIN: CPT | Mod: 25 | Performed by: PSYCHIATRY & NEUROLOGY

## 2023-08-01 RX ORDER — ERENUMAB-AOOE 140 MG/ML
140 INJECTION, SOLUTION SUBCUTANEOUS
Qty: 1 ML | Refills: 4 | Status: SHIPPED | OUTPATIENT
Start: 2023-08-01 | End: 2024-01-31

## 2023-08-01 RX ORDER — CYCLOBENZAPRINE HCL 5 MG
5 TABLET ORAL 3 TIMES DAILY PRN
Qty: 60 TABLET | Refills: 0 | Status: SHIPPED | OUTPATIENT
Start: 2023-08-01 | End: 2023-11-15

## 2023-08-01 NOTE — PROCEDURES
NEUROLOGY PROCEDURE NOTE  Aug 1, 2023      Chronic migraine Botox injection    CONSENT: The procedure was explained to the patient. The risks and benefits of the procedure and the alternatives were discussed with the patient. The patient was given an opportunity to ask any questions she had about the procedure. A verbal consent was obtained from the patient. A written consent is available in the chart.    PRE-PROCEDURE  Diagnosis: Chronic migraine  Headache frequency before the use of Botox:- 30 headache days per month  Headache frequency with Botox use:-0-1 headache days per month (restarting Botox today); recently worsened headaches 8/month.    EXAM  GENERAL: Healthy appearing, alert, no acute distress, normal habitus.  NEUROLOGICAL:  Patient is alert with fluent language.  Extraocular movements are intact.  Facial movements are present and symmetric.  No arm drift.    PROCEDURE SUMMARY:   The following muscles were identified after palpating for landmarks and the overlying skin was cleansed with alcohol. These muscles were then injected using a 30 guage- half  inch needle with the following doses of onabotulinumtoxin A. A 5 unit/ 0.1 ml dilution was used for the injections. A 2 x 100 unit vial was used.    Corrugators 5 units each side (not done).  Procerus 5 units (not done).  Frontalis 10 units each side (not done).  Temporalis 10 units each side.  Occipitalis 10 units each side.  Paraspinals 15 units each side.  Cervical Trapezius 15 units each side.   Trapezius 25 units each side.    Patient has had a blepharoplasty and will hold off injecting in the forehead.    Units Injected: 150 Units  Units Discarded: 50 Units  Lot Number and Expiration: [id number]; 11/2025    The patient tolerated the procedure without any immediate complaints and will call the Neurology clinic if she has any problems or side effects from the medication. The patient will follow up in the Neurology clinic as previously decided.      Harsh  MD Claudia  Neurologist  [facility]  [phone]

## 2023-08-01 NOTE — LETTER
8/1/2023         RE: Shyann Jurado  [address] [address]        Dear Colleague,    Thank you for referring your patient, Shyann Jurado, to the [facility] [place]. Please see a copy of my visit note below.    NEUROLOGY OUTPATIENT PROGRESS NOTE   Aug 1, 2023     CHIEF COMPLAINT/REASON FOR VISIT/REASON FOR CONSULT  Patient presents with:  Botox    REASON FOR CONSULTATION- Headaches    HISTORY OF PRESENT ILLNESS  Shyann Jurado is a 48 year old female seen today for headaches. She reports that her headache started about 2-1/2 years ago. They have always been sporadic once or twice a month. No clear triggers for the headaches have been identified. To be related to birth control and she was switched from Irma to Sarah. This did not significantly help the headaches. There is some neck tension involved which could be leading to the headaches. Headaches generally are behind the right eye with some sharp pain photophobia and phonophobia. She does have a history of a cerebral aneurysm identified on the MRI done for headaches. Headaches are thought to be unrelated to the aneurysm. She is currently followed by Dr. Álvarez who plans to do yearly MRI brain to evaluate for aneurysms. Imitrex 50 mg has been beneficial in the past. She is also been using Flexeril intermittently. There is some history of iron deficiency. She has difficulty sleeping and uses melatonin and Unisom. Tizanidine did not provide any benefit and patient was switched to Flexeril. She has had some hair loss side effects with the Topamax. Propanolol tried last time could not be tolerated because of side effects and she stopped the medication. Imitrex injections have also been prescribed for abortive therapy though she mainly uses the oral tablets.    Patient returns today reports that she is using 1 tablet of Flexeril every night. Headaches have significantly improved and she only has one headache in the last 6  weeks since I saw her. She used a combination of propanolol Flexeril Imitrex and caffeine as abortive therapy which worked well for her. Has gained some weight on the propanolol. Currently is off the propanolol on every day basis. Reports that her headache was at the time of the menstrual cycle. She is planning on seeing her OB/GYN may be change of birth control. Complains of some spotting. She is sleeping better with the Flexeril.    10/4/20  Patient returns today.  She reports that in the summer months she did not have any headaches no recently headaches have come back.  Headaches unchanged in nature.  The headache is still behind the right eye.  She wonders if the headaches might be related to her cycle being a bit off though is not completely sure what caused it.  There was some stress involved because her daughter tested positive for COVID though that should have made the headaches worse during summertime.  She did have one headache in September this started with right-sided neck tension and was slightly more severe than her baseline headaches.  She is using propanolol Flexeril Imitrex and caffeine for abortive therapy.  This combination seems to work for her.  Is not taking any preventive medication at this point.  Is using Flexeril to help with sleep.  Has rarely (about 2 times) needed to use the Imitrex injection.    She did receive a diagnostic angiogram and her aneurysm is stable.  This plans to do another angiogram in 1 year.    11/23/20  Patient returns today.  Reports that for the last 2 weeks she has been having a continuous headache.  She was prescribed nortriptyline when she had called the clinic which she has not tried so far.  She does not want to be on medications.  Is interested in getting the Botox done today.  She also gets cosmetic Botox for her forehead.  She reports this was a month ago when she got 10 units.  She thinks her some of her headaches might be hormonal in nature.  She continues  to use [place] and Imitrex for abortive therapy.  We talked about her being premenopause.  She is going to stop her birth control.  She is thinking about hysterectomy.  Her mother needed to get hysterectomy done.  We talked about that the migraines might get worse stopping the birth control or after menopause.  She does not need to get the hysterectomy just because of the headaches.  There is no clear evidence that the hysterectomy will help the headaches.  Headaches otherwise unchanged nature.  She has a few bad headaches since she was last seen.    She is sleeping well at this point with the Flexeril at nighttime.  No aneurysm symptoms.    2/24/21  Patient returns today.  Reports that she is having 2 headache days a month with the Botox.  Denies any side effects.  Feels that her neck is less tight with the Botox.  She has stopped the propanolol.  She is no longer using the Imitrex oral tablets.  She still needs to use injections and has to use that twice.  Nurtec has been working well for abortive therapy.  Reports no aneurysm-like symptoms  Scheduled for bilateral blepharoplasty surgery next week.  Does not want us injecting in the forehead.  Is using her cyclobenzaprine at night and on as-needed basis.  Has talked to her OB/GYN and feels that hysterectomy will make things worse and that is on hold for right now.    4/20/21  Patient returns today.  She reports one bad migraine and 3 minor headaches in the last 2 months.  Overall Botox has been working really well.  Reports no side effects.  She has had a blepharoplasty surgery reports some scalp numbness behind her hairline.  Nurtec works really well for abortive therapy.  Imitrex injections occasionally she will have to use for more severe headaches.  Reports no aneurysm symptoms.  Has MRI scheduled for May.  Flexeril has been used as needed.  She stopped the propanolol.  Denies any other hormonal issues.    3/29/22  Patient returns today.  She was seen last  year.  Has not done the Botox since then.  Headaches have gotten worse again.  She is having 9 days a month.  Previously used to be behind the right temple/eye and those headaches have not come back.  Exam more in the back of the head radiating up the neck.  There is associated photophobia and phonophobia.  She does use Flexeril as needed.  Is using Imitrex injections and tablets though that does make her sleepy.  Nurtec was working previously but is no longer that helpful.  She is interested in trying Emgality.  Is off the propanolol at this point.  Remains on the Irma birth control.  Does not think this is causing her headaches    7/26/22  Patient returns today  1.  She has been on Emgality for 3 months now.  Reports 1-2 headaches a month.  Generally uses Nurtec but if the headache is more severe needs to use the Imitrex.  We will use 100 mg of Imitrex (50x2).  Rarely needs to use the Imitrex injection.  2.  Is no longer needing to use the propanolol.  For neck pain she is intermittently using the Flexeril.  Uses it about 4 times a week.  3.  Aneurysm is stable on the MRI.  Wants to transition care to me since her neurosurgeon is no longer with [place].  4.  Remains on the aspirin to control  5.  Is off the propanolol at this point.  6.  Sleeping well at night.    1/31/23  Patient returns for  1.  Reports that she has been doing the Emgality though it has not really been helping.  Will have headaches on Monday mornings.  Has been more active over the weekends which is possibly causing the headaches.  2.  For abortive therapy she is mainly using the Imitrex but is no longer using the propanolol or caffeine.  Does use Flexeril about 2 times a week to help her sleep as well as for neck issues.  3.  Wants to restart the Botox today with her new insurance  4.  No new aneurysm symptoms.  Needs an MRI this year.  Remains on aspirin.  No new concerns/issues    4/4/23  Patient returns today  1.  She did extremely well with  the Botox.  No side effects.  Has had 2 headaches in the last 2 months.  The first headache was right after the Botox and the second headache was the end of March at the time of her menstrual cycle.  2.  Imitrex is helping for abortive therapy.  She is no longer needing to use the propanolol or caffeine.  Also has Nurtec as backup.  3.  Has not been able to do the MRI so far.  No aneurysm symptoms.  4.  Occasionally will use Flexeril at nighttime though sometimes that causes her to oversleep and cause headaches to get worse.  No new concerns.    8/1/23  Patient returns today  1.  Previously patient was really doing well with the Botox though recently headaches have worsened.  We discussed possible triggers and it could be related to he does smoke/heat.  Does complain of a lot of neck stiffness and has headaches in the morning.  Is currently having 8 headaches a month.  Does take Flexeril before bedtime which does help and would like a refill.  2.  Imitrex is no longer helping for abortive therapy.  Did try the Nurtec once or twice a week without benefit.  Wants to try Ubrelvy.  3.  She also had requested through [employer] about trying Vypeti and this was denied by insurance.  She wants to try it again though would need to try Aimovig first which she wants to go on.  4.  Her mother also has migraines.  She gets trigger point injections and uses gabapentin.  Discussed that gabapentin is minimally beneficial for headaches if she is interested in trigger point injections which could be a good option.    Previous history is reviewed and this is unchanged.    PAST MEDICAL/SURGICAL HISTORY  Past Medical History:   Diagnosis Date     Anemia      Brain aneurysm      Chronic headaches      Colon polyps      Fatigue      Iron deficiency anemia      Ptosis of eyelid, bilateral      Vitamin D deficiency      Patient Active Problem List   Diagnosis     Vitamin D deficiency     Iron deficiency anemia     Insomnia     Hypothyroidism      History of colonic polyps     Fatigue     Chronic migraine     Brain aneurysm   Significant for high cholesterol, migraine headaches, depression, iron deficiency      FAMILY HISTORY  Family History   Problem Relation Age of Onset     Migraines Mother      Aneurysm Mother      Melanoma Father      Other - See Comments Daughter         ELIAS     Hashimoto's thyroiditis Daughter      Kidney Cancer Maternal Grandmother 70     Colon Cancer Maternal Grandfather 84     Prostate Cancer Paternal Grandfather 80     Kidney Cancer Paternal Grandfather      Colon Cancer Paternal Grandfather 78     Other - See Comments Son         PANDAS     Gallbladder Disease Maternal Aunt 75     Aneurysm Maternal Uncle         Brain     Colon Cancer Paternal Aunt 50     Melanoma Paternal Aunt      Aneurysm Paternal Aunt         Brain     Melanoma Paternal Aunt 48     Breast Cancer No family hx of      Ovarian Cancer No family hx of    Reviewed and negative for neurological conditions    SOCIAL HISTORY  Social History     Tobacco Use     Smoking status: Never     Smokeless tobacco: Never   Substance Use Topics     Alcohol use: Not Currently     Drug use: Never       SYSTEMS REVIEW  Twelve-system ROS was done and other than the HPI this was negative.   No new symptoms.    MEDICATIONS  acetaminophen (TYLENOL) 500 MG tablet, Take 500 mg by mouth every 6 hours as needed for mild pain  doxylamine (UNISOM) 25 MG TABS tablet, Take 25 mg by mouth nightly as needed  drospirenone-ethinyl estradiol (ADRIÁN) 3-0.03 MG tablet, 4 packs for 3 months, uses active birth control pill for 12 weeks and off for 1 week.  ibuprofen (ADVIL/MOTRIN) 800 MG tablet, Take 800 mg by mouth every 8 hours as needed for moderate pain  melatonin 3 MG tablet, Take 1 mg by mouth nightly as needed for sleep  SUMAtriptan (IMITREX STATDOSE) 6 MG/0.5ML pen injector kit, Inject 0.5 mLs (6 mg) Subcutaneous at onset of headache for migraine  SUMAtriptan (IMITREX) 100 MG tablet, Take  1 tablet (100 mg) by mouth at onset of headache for migraine Can repeat in 2 hours if needed  tretinoin (RETIN-A) 0.1 % external cream,   vitamin D2 (ERGOCALCIFEROL) 85387 units (1250 mcg) capsule, TAKE 1 CAPSULE BY MOUTH WEEKLY    Botulinum Toxin Type A (BOTOX) 200 units injection 155 Units  Botulinum Toxin Type A (BOTOX) 200 units injection 200 Units       PHYSICAL EXAMINATION  VITALS: /79   Pulse 80   Resp 16   Wt 64.4 kg (142 lb)   LMP 06/15/2023 (Exact Date)   BMI 25.15 kg/m    GENERAL: Healthy appearing, alert, no acute distress, normal habitus.  CARDIOVASCULAR: Ext warm and well perfused. Pulses present.   NEUROLOGICAL: Patient is awake and oriented to self, place and time. Attention span is normal. Memory grossly intact. Language is fluent and follows commands appropriately. Appropriate fund of knowledge. Cranial nerves 2-12 are intact. There is no pronator drift. Motor exam shows 5/5 strength in all extremities. Tone is symmetric bilaterally in upper and lower extremities. Finger to nose is without dysmetria. Gait is normal and the patient is able to do tandem walk.  Exam stable.    DIAGNOSTICS  DSA  Conically-shaped right internal carotid artery superior hypophyseal   aneurysm measures 1.3 mm deep by 1.7 mm x 2.1 mm across. The aneurysm neck   measures up to 2.1 mm in maximum dimension. There is very mild fusiform   ectasia of the paraclinoid segment   giving rise to this more focal saccular aneurysm.    DSA 2020  1. 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    2. No evidence of new intracranial aneurysm.    Results were discussed with the patient and conveyed to the neurosurgical team immediately following the procedure.    Evaluation and stenosis determination based on NASCET criteria.    MRI  HEAD MRI:  1.  Negative brain MRI. No acute intracranial finding. No evidence  for recent  ischemia, intracranial hemorrhage, or mass.     HEAD MRA:  1.  Approximately 2 mm outpouching in the region of the right carotid cave may  reflect a small aneurysm. This is near the dural rings and could be intradural  in location.  2.  Otherwise negative brain MRA.        RELEVANT LABS  Ferritin 67    MRA 2021  HEAD MRI:   1.  Normal head MRI.     HEAD MRA:   1.  Accounting for differences in modality, no significant change in the size or morphology of a 1.3 x 1.7 x 2.1 mm right internal carotid artery superior hypophyseal aneurysm.   2.  No evidence of new intracranial aneurysm, high-flow vascular malformation or high-grade stenosis.     NECK MRA:  1.  Normal neck MRA.    MRA 2022-exam stable.  HEAD MRI:   1.  No evidence for recent infarction.     2.  No pathologic enhancement.     3.  No change from previous MRI brain evaluation 05/05/2021 as above.     HEAD MRA:   1.  Stable small unruptured right superior hypophyseal aneurysm. No evidence for adjacent hemorrhage or differential appearance of the aneurysm with respect to size/morphology and orientation. No new or progressive enlargement of aneurysms noted. Overall   [place] [facility] [place] MRA stable from prior study.     NECK MRA:  1.  No hemodynamic stenosis. Stable MRA from previous evaluation.      IMPRESSION/REPORT/PLAN  Insomnia, unspecified type  Chronic migraine  Cerebral aneurysm, nonruptured-stable  History of blepharoplasty surgery  Neck tightness/neck pain  Question hormonal migraines    This is a 48 year old female chronic migraines, unrelated cerebral aneurysm, insomnia.  There might be a hormonal component for her headaches.  Imaging studies have been negative in the past.    For prevention of her migraines she is currently on Botox.  Emgality has not helped in the past.  She has tried tizanidine, Flexeril, propanolol in the past.    Last round of Botox was less effective and will inject more in the cervical muscles where her headaches are  coming from.  She wants to try Vypeti.  Insurance requires that she try Aimovig.  Aimovig was prescribed.  Could consider trigger point injections more on the right side.    For  of headaches she is currently on Nurtec and occasionally will use Imitrex tablets/injections depending on severity of the headaches.  Has also tried a combination of Imitrex, Flexeril, propanolol, caffeine in the past.  We will try Ubrelvy instead of the Nurtec.    She is sleeping well at night.  Occasionally will use Flexeril if there is too much stiffness in the neck.  Some and this makes the headaches worse as she oversleeps.  Refilled Flexeril.    She has a history of right cerebral artery aneurysm.  Previous MRIs have been stable.  We will need a repeat MRA this year.  Needs yearly MRIs.  Stay well.    Her headaches might be hormonal and previously options were discussed with the patient in the past.  She can continue birth control per direction of OB/GYN.  She remains on the Irma.  Stable.    Botox today.  Return back in 2 months.    -     SUMAtriptan (IMITREX STATDOSE) 6 MG/0.5ML pen injector kit; Inject 0.5 mLs (6 mg) Subcutaneous at onset of headache for migraine  -     SUMAtriptan (IMITREX) 100 MG tablet; Take 1 tablet (100 mg) by mouth at onset of headache for migraine Can repeat in 2 hours if needed  -     MRA Brain ([place] [facility] [place]) wo & w Contrast; Future  -     ubrogepant (UBRELVY) 100 MG tablet; Take 1 tablet (100 mg) by mouth at onset of headache (headache)  -     erenumab-aooe (AIMOVIG) 140 MG/ML injection; Inject 1 mL (140 mg) Subcutaneous every 30 days  -     cyclobenzaprine (FLEXERIL) 5 MG tablet; Take 1 tablet (5 mg) by mouth 3 times daily as needed for muscle spasms    Return in about 2 months (around 10/1/2023) for In-Clinic Visit (must).    Over 30 minutes were spent coordinating the care for the patient on the day of the encounter.  This includes previsit, during visit and post visit activities as  documented above.  Counseling patient.  Prescription management.  Refractory problem.  This is in addition to the procedure time.  (Activities include but not inclusive of reviewing chart, reviewing outside records, reviewing labs and imaging study results as well as the images, patient visit time including getting history and exam,  use if applicable, review of test results with the patient and coming up with a plan in a shared model, counseling patient and family, education and answering patient questions, EMR , EMR diagnosis entry and problem list management, medication reconciliation and prescription management if applicable, paperwork if applicable, printing documents and documentation of the visit activities.)      King Taylor MD  Neurologist  [facility]  Tel:- [phone]    This note was dictated using voice recognition software.  Any grammatical or context distortions are unintentional and inherent to the software.      Again, thank you for allowing me to participate in the care of your patient.        Sincerely,        King Taylor MD

## 2023-08-01 NOTE — TELEPHONE ENCOUNTER
Central Prior Authorization Team   Phone: [phone]      PRIOR AUTHORIZATION DENIED    Medication: UBROGEPANT 100 MG PO TABS - QUANTITY LIMIT DENIED   Insurance Company: Kevin ([facility]) - Phone [phone] Fax [phone]  Denial Date: 8/1/2023  Denial Rational:         Appeal Information:       Patient Notified: No Please note: Providers/Clinics are to notify the patients of the denial outcomes and steps going forward.

## 2023-08-01 NOTE — PROGRESS NOTES
NEUROLOGY OUTPATIENT PROGRESS NOTE   Aug 1, 2023     CHIEF COMPLAINT/REASON FOR VISIT/REASON FOR CONSULT  Patient presents with:  Botox    REASON FOR CONSULTATION- Headaches    HISTORY OF PRESENT ILLNESS  Shyann Jurado is a 48 year old female seen today for headaches. She reports that her headache started about 2-1/2 years ago. They have always been sporadic once or twice a month. No clear triggers for the headaches have been identified. To be related to birth control and she was switched from Irma to Sarah. This did not significantly help the headaches. There is some neck tension involved which could be leading to the headaches. Headaches generally are behind the right eye with some sharp pain photophobia and phonophobia. She does have a history of a cerebral aneurysm identified on the MRI done for headaches. Headaches are thought to be unrelated to the aneurysm. She is currently followed by Dr. Álvarez who plans to do yearly MRI brain to evaluate for aneurysms. Imitrex 50 mg has been beneficial in the past. She is also been using Flexeril intermittently. There is some history of iron deficiency. She has difficulty sleeping and uses melatonin and Unisom. Tizanidine did not provide any benefit and patient was switched to Flexeril. She has had some hair loss side effects with the Topamax. Propanolol tried last time could not be tolerated because of side effects and she stopped the medication. Imitrex injections have also been prescribed for abortive therapy though she mainly uses the oral tablets.    Patient returns today reports that she is using 1 tablet of Flexeril every night. Headaches have significantly improved and she only has one headache in the last 6 weeks since I saw her. She used a combination of propanolol Flexeril Imitrex and caffeine as abortive therapy which worked well for her. Has gained some weight on the propanolol. Currently is off the propanolol on every day basis. Reports that her headache  was at the time of the menstrual cycle. She is planning on seeing her OB/GYN may be change of birth control. Complains of some spotting. She is sleeping better with the Flexeril.    10/4/20  Patient returns today.  She reports that in the summer months she did not have any headaches no recently headaches have come back.  Headaches unchanged in nature.  The headache is still behind the right eye.  She wonders if the headaches might be related to her cycle being a bit off though is not completely sure what caused it.  There was some stress involved because her daughter tested positive for COVID though that should have made the headaches worse during summertime.  She did have one headache in September this started with right-sided neck tension and was slightly more severe than her baseline headaches.  She is using propanolol Flexeril Imitrex and caffeine for abortive therapy.  This combination seems to work for her.  Is not taking any preventive medication at this point.  Is using Flexeril to help with sleep.  Has rarely (about 2 times) needed to use the Imitrex injection.    She did receive a diagnostic angiogram and her aneurysm is stable.  This plans to do another angiogram in 1 year.    11/23/20  Patient returns today.  Reports that for the last 2 weeks she has been having a continuous headache.  She was prescribed nortriptyline when she had called the clinic which she has not tried so far.  She does not want to be on medications.  Is interested in getting the Botox done today.  She also gets cosmetic Botox for her forehead.  She reports this was a month ago when she got 10 units.  She thinks her some of her headaches might be hormonal in nature.  She continues to use [place] and Imitrex for abortive therapy.  We talked about her being premenopause.  She is going to stop her birth control.  She is thinking about hysterectomy.  Her mother needed to get hysterectomy done.  We talked about that the migraines might get  worse stopping the birth control or after menopause.  She does not need to get the hysterectomy just because of the headaches.  There is no clear evidence that the hysterectomy will help the headaches.  Headaches otherwise unchanged nature.  She has a few bad headaches since she was last seen.    She is sleeping well at this point with the Flexeril at nighttime.  No aneurysm symptoms.    2/24/21  Patient returns today.  Reports that she is having 2 headache days a month with the Botox.  Denies any side effects.  Feels that her neck is less tight with the Botox.  She has stopped the propanolol.  She is no longer using the Imitrex oral tablets.  She still needs to use injections and has to use that twice.  Nurtec has been working well for abortive therapy.  Reports no aneurysm-like symptoms  Scheduled for bilateral blepharoplasty surgery next week.  Does not want us injecting in the forehead.  Is using her cyclobenzaprine at night and on as-needed basis.  Has talked to her OB/GYN and feels that hysterectomy will make things worse and that is on hold for right now.    4/20/21  Patient returns today.  She reports one bad migraine and 3 minor headaches in the last 2 months.  Overall Botox has been working really well.  Reports no side effects.  She has had a blepharoplasty surgery reports some scalp numbness behind her hairline.  Nurtec works really well for abortive therapy.  Imitrex injections occasionally she will have to use for more severe headaches.  Reports no aneurysm symptoms.  Has MRI scheduled for May.  Flexeril has been used as needed.  She stopped the propanolol.  Denies any other hormonal issues.    3/29/22  Patient returns today.  She was seen last year.  Has not done the Botox since then.  Headaches have gotten worse again.  She is having 9 days a month.  Previously used to be behind the right temple/eye and those headaches have not come back.  Exam more in the back of the head radiating up the neck.   There is associated photophobia and phonophobia.  She does use Flexeril as needed.  Is using Imitrex injections and tablets though that does make her sleepy.  Nurtec was working previously but is no longer that helpful.  She is interested in trying Emgality.  Is off the propanolol at this point.  Remains on the Irma birth control.  Does not think this is causing her headaches    7/26/22  Patient returns today  1.  She has been on Emgality for 3 months now.  Reports 1-2 headaches a month.  Generally uses Nurtec but if the headache is more severe needs to use the Imitrex.  We will use 100 mg of Imitrex (50x2).  Rarely needs to use the Imitrex injection.  2.  Is no longer needing to use the propanolol.  For neck pain she is intermittently using the Flexeril.  Uses it about 4 times a week.  3.  Aneurysm is stable on the MRI.  Wants to transition care to me since her neurosurgeon is no longer with [facility].  4.  Remains on the aspirin to control  5.  Is off the propanolol at this point.  6.  Sleeping well at night.    1/31/23  Patient returns for  1.  Reports that she has been doing the Emgality though it has not really been helping.  Will have headaches on Monday mornings.  Has been more active over the weekends which is possibly causing the headaches.  2.  For abortive therapy she is mainly using the Imitrex but is no longer using the propanolol or caffeine.  Does use Flexeril about 2 times a week to help her sleep as well as for neck issues.  3.  Wants to restart the Botox today with her new insurance  4.  No new aneurysm symptoms.  Needs an MRI this year.  Remains on aspirin.  No new concerns/issues    4/4/23  Patient returns today  1.  She did extremely well with the Botox.  No side effects.  Has had 2 headaches in the last 2 months.  The first headache was right after the Botox and the second headache was the end of March at the time of her menstrual cycle.  2.  Imitrex is helping for abortive therapy.  She is no  longer needing to use the propanolol or caffeine.  Also has Nurtec as backup.  3.  Has not been able to do the MRI so far.  No aneurysm symptoms.  4.  Occasionally will use Flexeril at nighttime though sometimes that causes her to oversleep and cause headaches to get worse.  No new concerns.    8/1/23  Patient returns today  1.  Previously patient was really doing well with the Botox though recently headaches have worsened.  We discussed possible triggers and it could be related to he does smoke/heat.  Does complain of a lot of neck stiffness and has headaches in the morning.  Is currently having 8 headaches a month.  Does take Flexeril before bedtime which does help and would like a refill.  2.  Imitrex is no longer helping for abortive therapy.  Did try the Nurtec once or twice a week without benefit.  Wants to try Ubrelvy.  3.  She also had requested through [facility] about trying Vypeti and this was denied by insurance.  She wants to try it again though would need to try Aimovig first which she wants to go on.  4.  Her mother also has migraines.  She gets trigger point injections and uses gabapentin.  Discussed that gabapentin is minimally beneficial for headaches if she is interested in trigger point injections which could be a good option.    Previous history is reviewed and this is unchanged.    PAST MEDICAL/SURGICAL HISTORY  Past Medical History:   Diagnosis Date    Anemia     Brain aneurysm     Chronic headaches     Colon polyps     Fatigue     Iron deficiency anemia     Ptosis of eyelid, bilateral     Vitamin D deficiency      Patient Active Problem List   Diagnosis    Vitamin D deficiency    Iron deficiency anemia    Insomnia    Hypothyroidism    History of colonic polyps    Fatigue    Chronic migraine    Brain aneurysm   Significant for high cholesterol, migraine headaches, depression, iron deficiency      FAMILY HISTORY  Family History   Problem Relation Age of Onset    Migraines Mother     Aneurysm  Mother     Melanoma Father     Other - See Comments Daughter         ELIAS    Hashimoto's thyroiditis Daughter     Kidney Cancer Maternal Grandmother 70    Colon Cancer Maternal Grandfather 84    Prostate Cancer Paternal Grandfather 80    Kidney Cancer Paternal Grandfather     Colon Cancer Paternal Grandfather 78    Other - See Comments Son         PANDAS    Gallbladder Disease Maternal Aunt 75    Aneurysm Maternal Uncle         Brain    Colon Cancer Paternal Aunt 50    Melanoma Paternal Aunt     Aneurysm Paternal Aunt         Brain    Melanoma Paternal Aunt 48    Breast Cancer No family hx of     Ovarian Cancer No family hx of    Reviewed and negative for neurological conditions    SOCIAL HISTORY  Social History     Tobacco Use    Smoking status: Never    Smokeless tobacco: Never   Substance Use Topics    Alcohol use: Not Currently    Drug use: Never       SYSTEMS REVIEW  Twelve-system ROS was done and other than the HPI this was negative.   No new symptoms.    MEDICATIONS  acetaminophen (TYLENOL) 500 MG tablet, Take 500 mg by mouth every 6 hours as needed for mild pain  doxylamine (UNISOM) 25 MG TABS tablet, Take 25 mg by mouth nightly as needed  drospirenone-ethinyl estradiol (ADRIÁN) 3-0.03 MG tablet, 4 packs for 3 months, uses active birth control pill for 12 weeks and off for 1 week.  ibuprofen (ADVIL/MOTRIN) 800 MG tablet, Take 800 mg by mouth every 8 hours as needed for moderate pain  melatonin 3 MG tablet, Take 1 mg by mouth nightly as needed for sleep  SUMAtriptan (IMITREX STATDOSE) 6 MG/0.5ML pen injector kit, Inject 0.5 mLs (6 mg) Subcutaneous at onset of headache for migraine  SUMAtriptan (IMITREX) 100 MG tablet, Take 1 tablet (100 mg) by mouth at onset of headache for migraine Can repeat in 2 hours if needed  tretinoin (RETIN-A) 0.1 % external cream,   vitamin D2 (ERGOCALCIFEROL) 16655 units (1250 mcg) capsule, TAKE 1 CAPSULE BY MOUTH WEEKLY    Botulinum Toxin Type A (BOTOX) 200 units injection 155  Units  Botulinum Toxin Type A (BOTOX) 200 units injection 200 Units       PHYSICAL EXAMINATION  VITALS: /79   Pulse 80   Resp 16   Wt 64.4 kg (142 lb)   LMP 06/15/2023 (Exact Date)   BMI 25.15 kg/m    GENERAL: Healthy appearing, alert, no acute distress, normal habitus.  CARDIOVASCULAR: Ext warm and well perfused. Pulses present.   NEUROLOGICAL: Patient is awake and oriented to self, place and time. Attention span is normal. Memory grossly intact. Language is fluent and follows commands appropriately. Appropriate fund of knowledge. Cranial nerves 2-12 are intact. There is no pronator drift. Motor exam shows 5/5 strength in all extremities. Tone is symmetric bilaterally in upper and lower extremities. Finger to nose is without dysmetria. Gait is normal and the patient is able to do tandem walk.  Exam stable.    DIAGNOSTICS  DSA  Conically-shaped right internal carotid artery superior hypophyseal   aneurysm measures 1.3 mm deep by 1.7 mm x 2.1 mm across. The aneurysm neck   measures up to 2.1 mm in maximum dimension. There is very mild fusiform   ectasia of the paraclinoid segment   giving rise to this more focal saccular aneurysm.    DSA 2020  1. 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    2. No evidence of new intracranial aneurysm.    Results were discussed with the patient and conveyed to the neurosurgical team immediately following the procedure.    Evaluation and stenosis determination based on NASCET criteria.    MRI  HEAD MRI:  1.  Negative brain MRI. No acute intracranial finding. No evidence for recent  ischemia, intracranial hemorrhage, or mass.     HEAD MRA:  1.  Approximately 2 mm outpouching in the region of the right carotid cave may  reflect a small aneurysm. This is near the dural rings and could be intradural  in location.  2.  Otherwise negative brain MRA.         RELEVANT LABS  Ferritin 67    MRA   HEAD MRI:   1.  Normal head MRI.     HEAD MRA:   1.  Accounting for differences in modality, no significant change in the size or morphology of a 1.3 x 1.7 x 2.1 mm right internal carotid artery superior hypophyseal aneurysm.   2.  No evidence of new intracranial aneurysm, high-flow vascular malformation or high-grade stenosis.     NECK MRA:  1.  Normal neck MRA.    MRA -exam stable.  HEAD MRI:   1.  No evidence for recent infarction.     2.  No pathologic enhancement.     3.  No change from previous MRI brain evaluation 2021 as above.     HEAD MRA:   1.  Stable small unruptured right superior hypophyseal aneurysm. No evidence for adjacent hemorrhage or differential appearance of the aneurysm with respect to size/morphology and orientation. No new or progressive enlargement of aneurysms noted. Overall   [place] MRA stable from prior study.     NECK MRA:  1.  No hemodynamic stenosis. Stable MRA from previous evaluation.      IMPRESSION/REPORT/PLAN  Insomnia, unspecified type  Chronic migraine  Cerebral aneurysm, nonruptured-stable  History of blepharoplasty surgery  Neck tightness/neck pain  Question hormonal migraines    This is a 48 year old female chronic migraines, unrelated cerebral aneurysm, insomnia.  There might be a hormonal component for her headaches.  Imaging studies have been negative in the past.    For prevention of her migraines she is currently on Botox.  Emgality has not helped in the past.  She has tried tizanidine, Flexeril, propanolol in the past.    Last round of Botox was less effective and will inject more in the cervical muscles where her headaches are coming from.  She wants to try Vypeti.  Insurance requires that she try Aimovig.  Aimovig was prescribed.  Could consider trigger point injections more on the right side.    For  of headaches she is currently on Nurtec and occasionally will use Imitrex tablets/injections  depending on severity of the headaches.  Has also tried a combination of Imitrex, Flexeril, propanolol, caffeine in the past.  We will try Ubrelvy instead of the Nurtec.    She is sleeping well at night.  Occasionally will use Flexeril if there is too much stiffness in the neck.  Some and this makes the headaches worse as she oversleeps.  Refilled Flexeril.    She has a history of right cerebral artery aneurysm.  Previous MRIs have been stable.  We will need a repeat MRA this year.  Needs yearly MRIs.  Stay well.    Her headaches might be hormonal and previously options were discussed with the patient in the past.  She can continue birth control per direction of OB/GYN.  She remains on the Irma.  Stable.    Botox today.  Return back in 2 months.    -     SUMAtriptan (IMITREX STATDOSE) 6 MG/0.5ML pen injector kit; Inject 0.5 mLs (6 mg) Subcutaneous at onset of headache for migraine  -     SUMAtriptan (IMITREX) 100 MG tablet; Take 1 tablet (100 mg) by mouth at onset of headache for migraine Can repeat in 2 hours if needed  -     MRA Brain ([place] [facility] [place]) wo & w Contrast; Future  -     ubrogepant (UBRELVY) 100 MG tablet; Take 1 tablet (100 mg) by mouth at onset of headache (headache)  -     erenumab-aooe (AIMOVIG) 140 MG/ML injection; Inject 1 mL (140 mg) Subcutaneous every 30 days  -     cyclobenzaprine (FLEXERIL) 5 MG tablet; Take 1 tablet (5 mg) by mouth 3 times daily as needed for muscle spasms    Return in about 2 months (around 10/1/2023) for In-Clinic Visit (must).    Over 30 minutes were spent coordinating the care for the patient on the day of the encounter.  This includes previsit, during visit and post visit activities as documented above.  Counseling patient.  Prescription management.  Refractory problem.  This is in addition to the procedure time.  (Activities include but not inclusive of reviewing chart, reviewing outside records, reviewing labs and imaging study results as well as the images, patient  visit time including getting history and exam,  use if applicable, review of test results with the patient and coming up with a plan in a shared model, counseling patient and family, education and answering patient questions, EMR , EMR diagnosis entry and problem list management, medication reconciliation and prescription management if applicable, paperwork if applicable, printing documents and documentation of the visit activities.)      King Taylor MD  Neurologist  [facility]  Tel:- [phone]    This note was dictated using voice recognition software.  Any grammatical or context distortions are unintentional and inherent to the software.

## 2023-08-08 ENCOUNTER — PATIENT OUTREACH (OUTPATIENT)
Dept: CARE COORDINATION | Facility: CLINIC | Age: 48
End: 2023-08-08
Payer: COMMERCIAL

## 2023-08-22 ENCOUNTER — PATIENT OUTREACH (OUTPATIENT)
Dept: CARE COORDINATION | Facility: CLINIC | Age: 48
End: 2023-08-22
Payer: COMMERCIAL

## 2023-08-27 DIAGNOSIS — G43.719 INTRACTABLE CHRONIC MIGRAINE WITHOUT AURA AND WITHOUT STATUS MIGRAINOSUS: ICD-10-CM

## 2023-08-29 RX ORDER — CEFUROXIME AXETIL 250 MG/1
TABLET ORAL
Qty: 1 ML | Refills: 0 | Status: SHIPPED | OUTPATIENT
Start: 2023-08-29 | End: 2024-01-31

## 2023-08-29 NOTE — TELEPHONE ENCOUNTER
Refill request for: SUMAtriptan (IMITREX STATDOSE) 6 MG/0.5ML pen injector kit    Directions: Inject 0.5 mLs (6 mg) Subcutaneous at onset of headache for migraine     LOV: 08/01/23  NOV: 09/29/23    30 day supply with 0 refills Medication T'd for review and signature    Radha Khan LPN on 8/29/2023 at 11:01 AM

## 2023-09-29 ENCOUNTER — OFFICE VISIT (OUTPATIENT)
Dept: NEUROLOGY | Facility: CLINIC | Age: 48
End: 2023-09-29
Payer: COMMERCIAL

## 2023-09-29 VITALS
BODY MASS INDEX: 22.85 KG/M2 | SYSTOLIC BLOOD PRESSURE: 96 MMHG | RESPIRATION RATE: 16 BRPM | WEIGHT: 129 LBS | HEART RATE: 69 BPM | DIASTOLIC BLOOD PRESSURE: 69 MMHG

## 2023-09-29 DIAGNOSIS — G47.00 INSOMNIA, UNSPECIFIED TYPE: ICD-10-CM

## 2023-09-29 DIAGNOSIS — G43.719 INTRACTABLE CHRONIC MIGRAINE WITHOUT AURA AND WITHOUT STATUS MIGRAINOSUS: Primary | ICD-10-CM

## 2023-09-29 DIAGNOSIS — M54.2 NECK PAIN: ICD-10-CM

## 2023-09-29 DIAGNOSIS — I67.1 CEREBRAL ANEURYSM, NONRUPTURED: ICD-10-CM

## 2023-09-29 PROCEDURE — 99214 OFFICE O/P EST MOD 30 MIN: CPT | Mod: 25 | Performed by: PSYCHIATRY & NEUROLOGY

## 2023-09-29 PROCEDURE — 20553 NJX 1/MLT TRIGGER POINTS 3/>: CPT | Performed by: PSYCHIATRY & NEUROLOGY

## 2023-09-29 RX ORDER — GABAPENTIN 300 MG/1
300 CAPSULE ORAL
Qty: 90 CAPSULE | Refills: 1 | Status: SHIPPED | OUTPATIENT
Start: 2023-09-29 | End: 2024-01-31

## 2023-09-29 RX ORDER — BUPIVACAINE HYDROCHLORIDE 2.5 MG/ML
4 INJECTION, SOLUTION EPIDURAL; INFILTRATION; INTRACAUDAL ONCE
Status: COMPLETED | OUTPATIENT
Start: 2023-09-29 | End: 2023-09-29

## 2023-09-29 RX ADMIN — BUPIVACAINE HYDROCHLORIDE 10 MG: 2.5 INJECTION, SOLUTION EPIDURAL; INFILTRATION; INTRACAUDAL at 14:33

## 2023-09-29 NOTE — PROGRESS NOTES
NEUROLOGY OUTPATIENT PROGRESS NOTE   Sep 29, 2023     CHIEF COMPLAINT/REASON FOR VISIT/REASON FOR CONSULT  Patient presents with:  Follow Up    REASON FOR CONSULTATION- Headaches    HISTORY OF PRESENT ILLNESS  Shyann Jurado is a 48 year old female seen today for headaches. She reports that her headache started about 2-1/2 years ago. They have always been sporadic once or twice a month. No clear triggers for the headaches have been identified. To be related to birth control and she was switched from Irma to Sarah. This did not significantly help the headaches. There is some neck tension involved which could be leading to the headaches. Headaches generally are behind the right eye with some sharp pain photophobia and phonophobia. She does have a history of a cerebral aneurysm identified on the MRI done for headaches. Headaches are thought to be unrelated to the aneurysm. She is currently followed by Dr. Álvarez who plans to do yearly MRI brain to evaluate for aneurysms. Imitrex 50 mg has been beneficial in the past. She is also been using Flexeril intermittently. There is some history of iron deficiency. She has difficulty sleeping and uses melatonin and Unisom. Tizanidine did not provide any benefit and patient was switched to Flexeril. She has had some hair loss side effects with the Topamax. Propanolol tried last time could not be tolerated because of side effects and she stopped the medication. Imitrex injections have also been prescribed for abortive therapy though she mainly uses the oral tablets.    Patient returns today reports that she is using 1 tablet of Flexeril every night. Headaches have significantly improved and she only has one headache in the last 6 weeks since I saw her. She used a combination of propanolol Flexeril Imitrex and caffeine as abortive therapy which worked well for her. Has gained some weight on the propanolol. Currently is off the propanolol on every day basis. Reports that her  headache was at the time of the menstrual cycle. She is planning on seeing her OB/GYN may be change of birth control. Complains of some spotting. She is sleeping better with the Flexeril.    10/4/20  Patient returns today.  She reports that in the summer months she did not have any headaches no recently headaches have come back.  Headaches unchanged in nature.  The headache is still behind the right eye.  She wonders if the headaches might be related to her cycle being a bit off though is not completely sure what caused it.  There was some stress involved because her daughter tested positive for COVID though that should have made the headaches worse during summertime.  She did have one headache in September this started with right-sided neck tension and was slightly more severe than her baseline headaches.  She is using propanolol Flexeril Imitrex and caffeine for abortive therapy.  This combination seems to work for her.  Is not taking any preventive medication at this point.  Is using Flexeril to help with sleep.  Has rarely (about 2 times) needed to use the Imitrex injection.    She did receive a diagnostic angiogram and her aneurysm is stable.  This plans to do another angiogram in 1 year.    11/23/20  Patient returns today.  Reports that for the last 2 weeks she has been having a continuous headache.  She was prescribed nortriptyline when she had called the clinic which she has not tried so far.  She does not want to be on medications.  Is interested in getting the Botox done today.  She also gets cosmetic Botox for her forehead.  She reports this was a month ago when she got 10 units.  She thinks her some of her headaches might be hormonal in nature.  She continues to use [place]ell and Imitrex for abortive therapy.  We talked about her being premenopause.  She is going to stop her birth control.  She is thinking about hysterectomy.  Her mother needed to get hysterectomy done.  We talked about that the migraines  might get worse stopping the birth control or after menopause.  She does not need to get the hysterectomy just because of the headaches.  There is no clear evidence that the hysterectomy will help the headaches.  Headaches otherwise unchanged nature.  She has a few bad headaches since she was last seen.    She is sleeping well at this point with the Flexeril at nighttime.  No aneurysm symptoms.    2/24/21  Patient returns today.  Reports that she is having 2 headache days a month with the Botox.  Denies any side effects.  Feels that her neck is less tight with the Botox.  She has stopped the propanolol.  She is no longer using the Imitrex oral tablets.  She still needs to use injections and has to use that twice.  Nurtec has been working well for abortive therapy.  Reports no aneurysm-like symptoms  Scheduled for bilateral blepharoplasty surgery next week.  Does not want us injecting in the forehead.  Is using her cyclobenzaprine at night and on as-needed basis.  Has talked to her OB/GYN and feels that hysterectomy will make things worse and that is on hold for right now.    4/20/21  Patient returns today.  She reports one bad migraine and 3 minor headaches in the last 2 months.  Overall Botox has been working really well.  Reports no side effects.  She has had a blepharoplasty surgery reports some scalp numbness behind her hairline.  Nurtec works really well for abortive therapy.  Imitrex injections occasionally she will have to use for more severe headaches.  Reports no aneurysm symptoms.  Has MRI scheduled for May.  Flexeril has been used as needed.  She stopped the propanolol.  Denies any other hormonal issues.    3/29/22  Patient returns today.  She was seen last year.  Has not done the Botox since then.  Headaches have gotten worse again.  She is having 9 days a month.  Previously used to be behind the right temple/eye and those headaches have not come back.  Exam more in the back of the head radiating up the  neck.  There is associated photophobia and phonophobia.  She does use Flexeril as needed.  Is using Imitrex injections and tablets though that does make her sleepy.  Nurtec was working previously but is no longer that helpful.  She is interested in trying Emgality.  Is off the propanolol at this point.  Remains on the Irma birth control.  Does not think this is causing her headaches    7/26/22  Patient returns today  1.  She has been on Emgality for 3 months now.  Reports 1-2 headaches a month.  Generally uses Nurtec but if the headache is more severe needs to use the Imitrex.  We will use 100 mg of Imitrex (50x2).  Rarely needs to use the Imitrex injection.  2.  Is no longer needing to use the propanolol.  For neck pain she is intermittently using the Flexeril.  Uses it about 4 times a week.  3.  Aneurysm is stable on the MRI.  Wants to transition care to me since her neurosurgeon is no longer with [facility].  4.  Remains on the aspirin to control  5.  Is off the propanolol at this point.  6.  Sleeping well at night.    1/31/23  Patient returns for  1.  Reports that she has been doing the Emgality though it has not really been helping.  Will have headaches on Monday mornings.  Has been more active over the weekends which is possibly causing the headaches.  2.  For abortive therapy she is mainly using the Imitrex but is no longer using the propanolol or caffeine.  Does use Flexeril about 2 times a week to help her sleep as well as for neck issues.  3.  Wants to restart the Botox today with her new insurance  4.  No new aneurysm symptoms.  Needs an MRI this year.  Remains on aspirin.  No new concerns/issues    4/4/23  Patient returns today  1.  She did extremely well with the Botox.  No side effects.  Has had 2 headaches in the last 2 months.  The first headache was right after the Botox and the second headache was the end of March at the time of her menstrual cycle.  2.  Imitrex is helping for abortive therapy.  She  is no longer needing to use the propanolol or caffeine.  Also has Nurtec as backup.  3.  Has not been able to do the MRI so far.  No aneurysm symptoms.  4.  Occasionally will use Flexeril at nighttime though sometimes that causes her to oversleep and cause headaches to get worse.  No new concerns.    8/1/23  Patient returns today  1.  Previously patient was really doing well with the Botox though recently headaches have worsened.  We discussed possible triggers and it could be related to he does smoke/heat.  Does complain of a lot of neck stiffness and has headaches in the morning.  Is currently having 8 headaches a month.  Does take Flexeril before bedtime which does help and would like a refill.  2.  Imitrex is no longer helping for abortive therapy.  Did try the Nurtec once or twice a week without benefit.  Wants to try Ubrelvy.  3.  She also had requested through [facility] about trying Vypeti and this was denied by insurance.  She wants to try it again though would need to try Aimovig first which she wants to go on.  4.  Her mother also has migraines.  She gets trigger point injections and uses gabapentin.  Discussed that gabapentin is minimally beneficial for headaches if she is interested in trigger point injections which could be a good option.    9/29/23  Patient returns today  1.  She reports that she continues to have 2 headaches a week.  Headaches lasting for 24 hours.  Stable from the base of the head more on the right side than the left.  Has tried the Aimovig has not found any benefit.  Last session of Botox was less helpful.  Did try the higher dose which did not work either.  Is thinking about stopping the Botox.  2.  She did try the Ubrelvy for abortive therapy and it does cause some side effects though overall it is working very well for her.  Wants to stay on the medication.  3.  Wants to proceed with the trigger point injections today to see if those help.  4.  Is having some difficulty with sleep  and wants to try gabapentin at nighttime to help her sleep.  No other new concerns.    Previous history is reviewed and this is unchanged.    PAST MEDICAL/SURGICAL HISTORY  Past Medical History:   Diagnosis Date    Anemia     Brain aneurysm     Chronic headaches     Colon polyps     Fatigue     Iron deficiency anemia     Ptosis of eyelid, bilateral     Vitamin D deficiency      Patient Active Problem List   Diagnosis    Vitamin D deficiency    Iron deficiency anemia    Insomnia    Hypothyroidism    History of colonic polyps    Fatigue    Chronic migraine    Brain aneurysm   Significant for high cholesterol, migraine headaches, depression, iron deficiency      FAMILY HISTORY  Family History   Problem Relation Age of Onset    Migraines Mother     Aneurysm Mother     Melanoma Father     Other - See Comments Daughter         PANDAS    Hashimoto's thyroiditis Daughter     Kidney Cancer Maternal Grandmother 70    Colon Cancer Maternal Grandfather 84    Prostate Cancer Paternal Grandfather 80    Kidney Cancer Paternal Grandfather     Colon Cancer Paternal Grandfather 78    Other - See Comments Son         PANDAS    Gallbladder Disease Maternal Aunt 75    Aneurysm Maternal Uncle         Brain    Colon Cancer Paternal Aunt 50    Melanoma Paternal Aunt     Aneurysm Paternal Aunt         Brain    Melanoma Paternal Aunt 48    Breast Cancer No family hx of     Ovarian Cancer No family hx of    Reviewed and negative for neurological conditions    SOCIAL HISTORY  Social History     Tobacco Use    Smoking status: Never    Smokeless tobacco: Never   Substance Use Topics    Alcohol use: Not Currently    Drug use: Never       SYSTEMS REVIEW  Twelve-system ROS was done and other than the HPI this was negative.   No new issues.    MEDICATIONS  acetaminophen (TYLENOL) 500 MG tablet, Take 500 mg by mouth every 6 hours as needed for mild pain  cyclobenzaprine (FLEXERIL) 5 MG tablet, Take 1 tablet (5 mg) by mouth 3 times daily as needed for  muscle spasms  doxylamine (UNISOM) 25 MG TABS tablet, Take 25 mg by mouth nightly as needed  drospirenone-ethinyl estradiol (ADRIÁN) 3-0.03 MG tablet, 4 packs for 3 months, uses active birth control pill for 12 weeks and off for 1 week.  erenumab-aooe (AIMOVIG) 140 MG/ML injection, Inject 1 mL (140 mg) Subcutaneous every 30 days  ibuprofen (ADVIL/MOTRIN) 800 MG tablet, Take 800 mg by mouth every 8 hours as needed for moderate pain  melatonin 3 MG tablet, Take 1 mg by mouth nightly as needed for sleep  SUMAtriptan (IMITREX STATDOSE) 6 MG/0.5ML pen injector kit, INJECT 0.5 ML(6 MG) UNDER THE SKIN AT ONSET OF HEADACHE FOR MIGRAINE.  SUMAtriptan (IMITREX) 100 MG tablet, Take 1 tablet (100 mg) by mouth at onset of headache for migraine Can repeat in 2 hours if needed  tretinoin (RETIN-A) 0.1 % external cream,   ubrogepant (UBRELVY) 100 MG tablet, Take 1 tablet (100 mg) by mouth at onset of headache (headache)  vitamin D2 (ERGOCALCIFEROL) 39147 units (1250 mcg) capsule, TAKE 1 CAPSULE BY MOUTH WEEKLY    Botulinum Toxin Type A (BOTOX) 200 units injection 155 Units  Botulinum Toxin Type A (BOTOX) 200 units injection 200 Units       PHYSICAL EXAMINATION  VITALS: BP 96/69   Pulse 69   Resp 16   Wt 58.5 kg (129 lb)   BMI 22.85 kg/m    GENERAL: Healthy appearing, alert, no acute distress, normal habitus.  CARDIOVASCULAR: Ext warm and well perfused. Pulses present.   NEUROLOGICAL: Patient is awake and oriented to self, place and time. Attention span is normal. Memory grossly intact. Language is fluent and follows commands appropriately. Appropriate fund of knowledge. Cranial nerves 2-12 are intact. There is no pronator drift. Motor exam shows 5/5 strength in all extremities. Tone is symmetric bilaterally in upper and lower extremities. Finger to nose is without dysmetria. Gait is normal and the patient is able to do tandem walk.  Exam stable.    DIAGNOSTICS  DSA  Conically-shaped right internal carotid artery superior  hypophyseal   aneurysm measures 1.3 mm deep by 1.7 mm x 2.1 mm across. The aneurysm neck   measures up to 2.1 mm in maximum dimension. There is very mild fusiform   ectasia of the paraclinoid segment   giving rise to this more focal saccular aneurysm.    DSA 2020  1. 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    2. No evidence of new intracranial aneurysm.    Results were discussed with the patient and conveyed to the neurosurgical team immediately following the procedure.    Evaluation and stenosis determination based on NASCET criteria.    MRI  HEAD MRI:  1.  Negative brain MRI. No acute intracranial finding. No evidence for recent  ischemia, intracranial hemorrhage, or mass.     HEAD MRA:  1.  Approximately 2 mm outpouching in the region of the right carotid cave may  reflect a small aneurysm. This is near the dural rings and could be intradural  in location.  2.  Otherwise negative brain MRA.        RELEVANT LABS  Ferritin 67    MRA 2021  HEAD MRI:   1.  Normal head MRI.     HEAD MRA:   1.  Accounting for differences in modality, no significant change in the size or morphology of a 1.3 x 1.7 x 2.1 mm right internal carotid artery superior hypophyseal aneurysm.   2.  No evidence of new intracranial aneurysm, high-flow vascular malformation or high-grade stenosis.     NECK MRA:  1.  Normal neck MRA.    MRA 2022-exam stable.  HEAD MRI:   1.  No evidence for recent infarction.     2.  No pathologic enhancement.     3.  No change from previous MRI brain evaluation 05/05/2021 as above.     HEAD MRA:   1.  Stable small unruptured right superior hypophyseal aneurysm. No evidence for adjacent hemorrhage or differential appearance of the aneurysm with respect to size/morphology and orientation. No new or progressive enlargement of aneurysms noted. Overall   [place] MRA stable from prior  study.     NECK MRA:  1.  No hemodynamic stenosis. Stable MRA from previous evaluation.      IMPRESSION/REPORT/PLAN  Insomnia, unspecified type  Chronic migraine  Cerebral aneurysm, nonruptured-stable  History of blepharoplasty surgery  Neck tightness/neck pain  Question hormonal migraines    This is a 48 year old female chronic migraines, unrelated cerebral aneurysm, insomnia.  There might be a hormonal component for her headaches.  Imaging studies have been negative in the past.    For prevention of her migraines she is currently on Botox.  Emgality has not helped in the past.  She has tried tizanidine, Flexeril, propanolol in the past.    Last round of Botox was less effective and will inject more in the cervical muscles where her headaches are coming from but this was not helpful either.  She wants to try Vypeti.  Insurance requires that she try Aimovig.  Her weight has not really helped either.  Trigger point injections today and then decide during the next visit on the neck steps.    For  of headaches she is currently on Nurtec and occasionally will use Imitrex tablets/injections depending on severity of the headaches.  Has also tried a combination of Imitrex, Flexeril, propanolol, caffeine in the past.  Ubrelvy has been more helpful than the Nurtec and will continue.    She is sleeping well at night.  Occasionally will use Flexeril if there is too much stiffness in the neck.  Some and this makes the headaches worse as she oversleeps.  Continue Flexeril.  Add gabapentin for insomnia.    She has a history of right cerebral artery aneurysm.  Previous MRIs have been stable.  We will need a repeat MRA this year.  Needs yearly MRIs.  Stay well.    Her headaches might be hormonal and previously options were discussed with the patient in the past.  She can continue birth control per direction of OB/GYN.  She remains on the Irma.  Stable.    Point injections today.  Return in 2 months.    -     SUMAtriptan  (IMITREX STATDOSE) 6 MG/0.5ML pen injector kit; Inject 0.5 mLs (6 mg) Subcutaneous at onset of headache for migraine  -     SUMAtriptan (IMITREX) 100 MG tablet; Take 1 tablet (100 mg) by mouth at onset of headache for migraine Can repeat in 2 hours if needed  -     MRA Brain ([place]) wo & w Contrast; Future  -     ubrogepant (UBRELVY) 100 MG tablet; Take 1 tablet (100 mg) by mouth at onset of headache (headache)  -     erenumab-aooe (AIMOVIG) 140 MG/ML injection; Inject 1 mL (140 mg) Subcutaneous every 30 days  -     cyclobenzaprine (FLEXERIL) 5 MG tablet; Take 1 tablet (5 mg) by mouth 3 times daily as needed for muscle spasms  -     gabapentin (NEURONTIN) 300 MG capsule; Take 1 capsule (300 mg) by mouth nightly as needed (Insomnia)    Return in about 2 months (around 11/29/2023) for In-Clinic Visit (must), Trigger point injections.    Over 32 minutes were spent coordinating the care for the patient on the day of the encounter.  This includes previsit, during visit and post visit activities as documented above.  Counseling patient.  Refractory problem.  Prescription management.  This is in addition to the procedure time.  (Activities include but not inclusive of reviewing chart, reviewing outside records, reviewing labs and imaging study results as well as the images, patient visit time including getting history and exam,  use if applicable, review of test results with the patient and coming up with a plan in a shared model, counseling patient and family, education and answering patient questions, EMR , EMR diagnosis entry and problem list management, medication reconciliation and prescription management if applicable, paperwork if applicable, printing documents and documentation of the visit activities.)      King Taylor MD  Neurologist  [facility]  Tel:- [phone]    This note was dictated using voice recognition software.  Any grammatical  or context distortions are unintentional and inherent to the software.

## 2023-09-29 NOTE — PROCEDURES
NEUROLOGY PROCEDURE NOTE  Sep 29, 2023      Trigger Point injection    CONSENT: The procedure was explained to the patient. The risks and benefits of the procedure and the alternatives were discussed with the patient. The patient was given an opportunity to ask any questions they had about the procedure. A verbal consent was obtained from the patient. A written consent is available in the chart.    PRE-PROCEDURE  Diagnosis: Neck pain/Trigger point/Migraine Headaches    EXAM  GENERAL: Healthy appearing, alert, no acute distress, normal habitus.  NEUROLOGICAL:  Patient is alert with fluent language.  Extraocular movements are intact.  Facial movements are present and symmetric.  No arm drift.    PROCEDURE SUMMARY:   The following trigger points were identified after palpating for landmarks and the overlying skin was cleansed with alcohol. These muscles were then injected using a 30 guage- half inch needle with the following doses of bupivacaine 0.25% preservative-free.     Left trapezius 1 ml  Right trapezius 1 ml  Left splenius capitis 1 mL  Right splenius capitis 1 mL    Lot Number and Expiration: [id number]; 02/01/25    The patient tolerated the procedure without any immediate complaints and will call the Neurology clinic if she has any problems or side effects from the medication.  The patient noticed some improvement in the pain right after the injection.  The patient will follow up in the Neurology clinic as previously decided.      King Taylor MD  Neurologist  [facility]  [phone]

## 2023-09-29 NOTE — LETTER
9/29/2023         RE: Shyann Jurado  [address] [facility] [address]        Dear Colleague,    Thank you for referring your patient, Shyann Jurado, to the [facility] [place]. Please see a copy of my visit note below.    NEUROLOGY OUTPATIENT PROGRESS NOTE   Sep 29, 2023     CHIEF COMPLAINT/REASON FOR VISIT/REASON FOR CONSULT  Patient presents with:  Follow Up    REASON FOR CONSULTATION- Headaches    HISTORY OF PRESENT ILLNESS  Shyann Jurado is a 48 year old female seen today for headaches. She reports that her headache started about 2-1/2 years ago. They have always been sporadic once or twice a month. No clear triggers for the headaches have been identified. To be related to birth control and she was switched from Irma to Sarah. This did not significantly help the headaches. There is some neck tension involved which could be leading to the headaches. Headaches generally are behind the right eye with some sharp pain photophobia and phonophobia. She does have a history of a cerebral aneurysm identified on the MRI done for headaches. Headaches are thought to be unrelated to the aneurysm. She is currently followed by Dr. Álvarez who plans to do yearly MRI brain to evaluate for aneurysms. Imitrex 50 mg has been beneficial in the past. She is also been using Flexeril intermittently. There is some history of iron deficiency. She has difficulty sleeping and uses melatonin and Unisom. Tizanidine did not provide any benefit and patient was switched to Flexeril. She has had some hair loss side effects with the Topamax. Propanolol tried last time could not be tolerated because of side effects and she stopped the medication. Imitrex injections have also been prescribed for abortive therapy though she mainly uses the oral tablets.    Patient returns today reports that she is using 1 tablet of Flexeril every night. Headaches have significantly improved and she only has one headache in the  last 6 weeks since I saw her. She used a combination of propanolol Flexeril Imitrex and caffeine as abortive therapy which worked well for her. Has gained some weight on the propanolol. Currently is off the propanolol on every day basis. Reports that her headache was at the time of the menstrual cycle. She is planning on seeing her OB/GYN may be change of birth control. Complains of some spotting. She is sleeping better with the Flexeril.    10/4/20  Patient returns today.  She reports that in the summer months she did not have any headaches no recently headaches have come back.  Headaches unchanged in nature.  The headache is still behind the right eye.  She wonders if the headaches might be related to her cycle being a bit off though is not completely sure what caused it.  There was some stress involved because her daughter tested positive for COVID though that should have made the headaches worse during summertime.  She did have one headache in September this started with right-sided neck tension and was slightly more severe than her baseline headaches.  She is using propanolol Flexeril Imitrex and caffeine for abortive therapy.  This combination seems to work for her.  Is not taking any preventive medication at this point.  Is using Flexeril to help with sleep.  Has rarely (about 2 times) needed to use the Imitrex injection.    She did receive a diagnostic angiogram and her aneurysm is stable.  This plans to do another angiogram in 1 year.    11/23/20  Patient returns today.  Reports that for the last 2 weeks she has been having a continuous headache.  She was prescribed nortriptyline when she had called the clinic which she has not tried so far.  She does not want to be on medications.  Is interested in getting the Botox done today.  She also gets cosmetic Botox for her forehead.  She reports this was a month ago when she got 10 units.  She thinks her some of her headaches might be hormonal in nature.  She  continues to use [place] and Imitrex for abortive therapy.  We talked about her being premenopause.  She is going to stop her birth control.  She is thinking about hysterectomy.  Her mother needed to get hysterectomy done.  We talked about that the migraines might get worse stopping the birth control or after menopause.  She does not need to get the hysterectomy just because of the headaches.  There is no clear evidence that the hysterectomy will help the headaches.  Headaches otherwise unchanged nature.  She has a few bad headaches since she was last seen.    She is sleeping well at this point with the Flexeril at nighttime.  No aneurysm symptoms.    2/24/21  Patient returns today.  Reports that she is having 2 headache days a month with the Botox.  Denies any side effects.  Feels that her neck is less tight with the Botox.  She has stopped the propanolol.  She is no longer using the Imitrex oral tablets.  She still needs to use injections and has to use that twice.  Nurtec has been working well for abortive therapy.  Reports no aneurysm-like symptoms  Scheduled for bilateral blepharoplasty surgery next week.  Does not want us injecting in the forehead.  Is using her cyclobenzaprine at night and on as-needed basis.  Has talked to her OB/GYN and feels that hysterectomy will make things worse and that is on hold for right now.    4/20/21  Patient returns today.  She reports one bad migraine and 3 minor headaches in the last 2 months.  Overall Botox has been working really well.  Reports no side effects.  She has had a blepharoplasty surgery reports some scalp numbness behind her hairline.  Nurtec works really well for abortive therapy.  Imitrex injections occasionally she will have to use for more severe headaches.  Reports no aneurysm symptoms.  Has MRI scheduled for May.  Flexeril has been used as needed.  She stopped the propanolol.  Denies any other hormonal issues.    3/29/22  Patient returns today.  She was  seen last year.  Has not done the Botox since then.  Headaches have gotten worse again.  She is having 9 days a month.  Previously used to be behind the right temple/eye and those headaches have not come back.  Exam more in the back of the head radiating up the neck.  There is associated photophobia and phonophobia.  She does use Flexeril as needed.  Is using Imitrex injections and tablets though that does make her sleepy.  Nurtec was working previously but is no longer that helpful.  She is interested in trying Emgality.  Is off the propanolol at this point.  Remains on the Irma birth control.  Does not think this is causing her headaches    7/26/22  Patient returns today  1.  She has been on Emgality for 3 months now.  Reports 1-2 headaches a month.  Generally uses Nurtec but if the headache is more severe needs to use the Imitrex.  We will use 100 mg of Imitrex (50x2).  Rarely needs to use the Imitrex injection.  2.  Is no longer needing to use the propanolol.  For neck pain she is intermittently using the Flexeril.  Uses it about 4 times a week.  3.  Aneurysm is stable on the MRI.  Wants to transition care to me since her neurosurgeon is no longer with [facility].  4.  Remains on the aspirin to control  5.  Is off the propanolol at this point.  6.  Sleeping well at night.    1/31/23  Patient returns for  1.  Reports that she has been doing the Emgality though it has not really been helping.  Will have headaches on Monday mornings.  Has been more active over the weekends which is possibly causing the headaches.  2.  For abortive therapy she is mainly using the Imitrex but is no longer using the propanolol or caffeine.  Does use Flexeril about 2 times a week to help her sleep as well as for neck issues.  3.  Wants to restart the Botox today with her new insurance  4.  No new aneurysm symptoms.  Needs an MRI this year.  Remains on aspirin.  No new concerns/issues    4/4/23  Patient returns today  1.  She did extremely  well with the Botox.  No side effects.  Has had 2 headaches in the last 2 months.  The first headache was right after the Botox and the second headache was the end of March at the time of her menstrual cycle.  2.  Imitrex is helping for abortive therapy.  She is no longer needing to use the propanolol or caffeine.  Also has Nurtec as backup.  3.  Has not been able to do the MRI so far.  No aneurysm symptoms.  4.  Occasionally will use Flexeril at nighttime though sometimes that causes her to oversleep and cause headaches to get worse.  No new concerns.    8/1/23  Patient returns today  1.  Previously patient was really doing well with the Botox though recently headaches have worsened.  We discussed possible triggers and it could be related to he does smoke/heat.  Does complain of a lot of neck stiffness and has headaches in the morning.  Is currently having 8 headaches a month.  Does take Flexeril before bedtime which does help and would like a refill.  2.  Imitrex is no longer helping for abortive therapy.  Did try the Nurtec once or twice a week without benefit.  Wants to try Ubrelvy.  3.  She also had requested through [facility] about trying Vypeti and this was denied by insurance.  She wants to try it again though would need to try Aimovig first which she wants to go on.  4.  Her mother also has migraines.  She gets trigger point injections and uses gabapentin.  Discussed that gabapentin is minimally beneficial for headaches if she is interested in trigger point injections which could be a good option.    9/29/23  Patient returns today  1.  She reports that she continues to have 2 headaches a week.  Headaches lasting for 24 hours.  Stable from the base of the head more on the right side than the left.  Has tried the Aimovig has not found any benefit.  Last session of Botox was less helpful.  Did try the higher dose which did not work either.  Is thinking about stopping the Botox.  2.  She did try the Ubrelvy for  abortive therapy and it does cause some side effects though overall it is working very well for her.  Wants to stay on the medication.  3.  Wants to proceed with the trigger point injections today to see if those help.  4.  Is having some difficulty with sleep and wants to try gabapentin at nighttime to help her sleep.  No other new concerns.    Previous history is reviewed and this is unchanged.    PAST MEDICAL/SURGICAL HISTORY  Past Medical History:   Diagnosis Date     Anemia      Brain aneurysm      Chronic headaches      Colon polyps      Fatigue      Iron deficiency anemia      Ptosis of eyelid, bilateral      Vitamin D deficiency      Patient Active Problem List   Diagnosis     Vitamin D deficiency     Iron deficiency anemia     Insomnia     Hypothyroidism     History of colonic polyps     Fatigue     Chronic migraine     Brain aneurysm   Significant for high cholesterol, migraine headaches, depression, iron deficiency      FAMILY HISTORY  Family History   Problem Relation Age of Onset     Migraines Mother      Aneurysm Mother      Melanoma Father      Other - See Comments Daughter         PANDAS     Hashimoto's thyroiditis Daughter      Kidney Cancer Maternal Grandmother 70     Colon Cancer Maternal Grandfather 84     Prostate Cancer Paternal Grandfather 80     Kidney Cancer Paternal Grandfather      Colon Cancer Paternal Grandfather 78     Other - See Comments Son         PANDAS     Gallbladder Disease Maternal Aunt 75     Aneurysm Maternal Uncle         Brain     Colon Cancer Paternal Aunt 50     Melanoma Paternal Aunt      Aneurysm Paternal Aunt         Brain     Melanoma Paternal Aunt 48     Breast Cancer No family hx of      Ovarian Cancer No family hx of    Reviewed and negative for neurological conditions    SOCIAL HISTORY  Social History     Tobacco Use     Smoking status: Never     Smokeless tobacco: Never   Substance Use Topics     Alcohol use: Not Currently     Drug use: Never       SYSTEMS  REVIEW  Twelve-system ROS was done and other than the HPI this was negative.   No new issues.    MEDICATIONS  acetaminophen (TYLENOL) 500 MG tablet, Take 500 mg by mouth every 6 hours as needed for mild pain  cyclobenzaprine (FLEXERIL) 5 MG tablet, Take 1 tablet (5 mg) by mouth 3 times daily as needed for muscle spasms  doxylamine (UNISOM) 25 MG TABS tablet, Take 25 mg by mouth nightly as needed  drospirenone-ethinyl estradiol (ADRIÁN) 3-0.03 MG tablet, 4 packs for 3 months, uses active birth control pill for 12 weeks and off for 1 week.  erenumab-aooe (AIMOVIG) 140 MG/ML injection, Inject 1 mL (140 mg) Subcutaneous every 30 days  ibuprofen (ADVIL/MOTRIN) 800 MG tablet, Take 800 mg by mouth every 8 hours as needed for moderate pain  melatonin 3 MG tablet, Take 1 mg by mouth nightly as needed for sleep  SUMAtriptan (IMITREX STATDOSE) 6 MG/0.5ML pen injector kit, INJECT 0.5 ML(6 MG) UNDER THE SKIN AT ONSET OF HEADACHE FOR MIGRAINE.  SUMAtriptan (IMITREX) 100 MG tablet, Take 1 tablet (100 mg) by mouth at onset of headache for migraine Can repeat in 2 hours if needed  tretinoin (RETIN-A) 0.1 % external cream,   ubrogepant (UBRELVY) 100 MG tablet, Take 1 tablet (100 mg) by mouth at onset of headache (headache)  vitamin D2 (ERGOCALCIFEROL) 04082 units (1250 mcg) capsule, TAKE 1 CAPSULE BY MOUTH WEEKLY    Botulinum Toxin Type A (BOTOX) 200 units injection 155 Units  Botulinum Toxin Type A (BOTOX) 200 units injection 200 Units       PHYSICAL EXAMINATION  VITALS: BP 96/69   Pulse 69   Resp 16   Wt 58.5 kg (129 lb)   BMI 22.85 kg/m    GENERAL: Healthy appearing, alert, no acute distress, normal habitus.  CARDIOVASCULAR: Ext warm and well perfused. Pulses present.   NEUROLOGICAL: Patient is awake and oriented to self, place and time. Attention span is normal. Memory grossly intact. Language is fluent and follows commands appropriately. Appropriate fund of knowledge. Cranial nerves 2-12 are intact. There is no pronator  drift. Motor exam shows 5/5 strength in all extremities. Tone is symmetric bilaterally in upper and lower extremities. Finger to nose is without dysmetria. Gait is normal and the patient is able to do tandem walk.  Exam stable.    DIAGNOSTICS  DSA  Conically-shaped right internal carotid artery superior hypophyseal   aneurysm measures 1.3 mm deep by 1.7 mm x 2.1 mm across. The aneurysm neck   measures up to 2.1 mm in maximum dimension. There is very mild fusiform   ectasia of the paraclinoid segment   giving rise to this more focal saccular aneurysm.    DSA 2020  1. 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    2. No evidence of new intracranial aneurysm.    Results were discussed with the patient and conveyed to the neurosurgical team immediately following the procedure.    Evaluation and stenosis determination based on NASCET criteria.    MRI  HEAD MRI:  1.  Negative brain MRI. No acute intracranial finding. No evidence for recent  ischemia, intracranial hemorrhage, or mass.     HEAD MRA:  1.  Approximately 2 mm outpouching in the region of the right carotid cave may  reflect a small aneurysm. This is near the dural rings and could be intradural  in location.  2.  Otherwise negative brain MRA.        RELEVANT LABS  Ferritin 67    MRA 2021  HEAD MRI:   1.  Normal head MRI.     HEAD MRA:   1.  Accounting for differences in modality, no significant change in the size or morphology of a 1.3 x 1.7 x 2.1 mm right internal carotid artery superior hypophyseal aneurysm.   2.  No evidence of new intracranial aneurysm, high-flow vascular malformation or high-grade stenosis.     NECK MRA:  1.  Normal neck MRA.    MRA 2022-exam stable.  HEAD MRI:   1.  No evidence for recent infarction.     2.  No pathologic enhancement.     3.  No change from previous MRI brain evaluation 05/05/2021 as above.     HEAD MRA:    1.  Stable small unruptured right superior hypophyseal aneurysm. No evidence for adjacent hemorrhage or differential appearance of the aneurysm with respect to size/morphology and orientation. No new or progressive enlargement of aneurysms noted. Overall   [place] MRA stable from prior study.     NECK MRA:  1.  No hemodynamic stenosis. Stable MRA from previous evaluation.      IMPRESSION/REPORT/PLAN  Insomnia, unspecified type  Chronic migraine  Cerebral aneurysm, nonruptured-stable  History of blepharoplasty surgery  Neck tightness/neck pain  Question hormonal migraines    This is a 48 year old female chronic migraines, unrelated cerebral aneurysm, insomnia.  There might be a hormonal component for her headaches.  Imaging studies have been negative in the past.    For prevention of her migraines she is currently on Botox.  Emgality has not helped in the past.  She has tried tizanidine, Flexeril, propanolol in the past.    Last round of Botox was less effective and will inject more in the cervical muscles where her headaches are coming from but this was not helpful either.  She wants to try Vypeti.  Insurance requires that she try Aimovig.  Her weight has not really helped either.  Trigger point injections today and then decide during the next visit on the neck steps.    For  of headaches she is currently on Nurtec and occasionally will use Imitrex tablets/injections depending on severity of the headaches.  Has also tried a combination of Imitrex, Flexeril, propanolol, caffeine in the past.  Ubrelvy has been more helpful than the Nurtec and will continue.    She is sleeping well at night.  Occasionally will use Flexeril if there is too much stiffness in the neck.  Some and this makes the headaches worse as she oversleeps.  Continue Flexeril.  Add gabapentin for insomnia.    She has a history of right cerebral artery aneurysm.  Previous MRIs have been stable.  We will need a repeat MRA this year.   Needs yearly MRIs.  Stay well.    Her headaches might be hormonal and previously options were discussed with the patient in the past.  She can continue birth control per direction of OB/GYN.  She remains on the Irma.  Stable.    Point injections today.  Return in 2 months.    -     SUMAtriptan (IMITREX STATDOSE) 6 MG/0.5ML pen injector kit; Inject 0.5 mLs (6 mg) Subcutaneous at onset of headache for migraine  -     SUMAtriptan (IMITREX) 100 MG tablet; Take 1 tablet (100 mg) by mouth at onset of headache for migraine Can repeat in 2 hours if needed  -     MRA Brain ([place] [facility] [place]) wo & w Contrast; Future  -     ubrogepant (UBRELVY) 100 MG tablet; Take 1 tablet (100 mg) by mouth at onset of headache (headache)  -     erenumab-aooe (AIMOVIG) 140 MG/ML injection; Inject 1 mL (140 mg) Subcutaneous every 30 days  -     cyclobenzaprine (FLEXERIL) 5 MG tablet; Take 1 tablet (5 mg) by mouth 3 times daily as needed for muscle spasms  -     gabapentin (NEURONTIN) 300 MG capsule; Take 1 capsule (300 mg) by mouth nightly as needed (Insomnia)    Return in about 2 months (around 11/29/2023) for In-Clinic Visit (must), Trigger point injections.    Over 32 minutes were spent coordinating the care for the patient on the day of the encounter.  This includes previsit, during visit and post visit activities as documented above.  Counseling patient.  Refractory problem.  Prescription management.  This is in addition to the procedure time.  (Activities include but not inclusive of reviewing chart, reviewing outside records, reviewing labs and imaging study results as well as the images, patient visit time including getting history and exam,  use if applicable, review of test results with the patient and coming up with a plan in a shared model, counseling patient and family, education and answering patient questions, EMR , EMR diagnosis entry and problem list management, medication reconciliation and prescription  management if applicable, paperwork if applicable, printing documents and documentation of the visit activities.)      Knig Taylor MD  Neurologist  [facility]  Tel:- [phone]    This note was dictated using voice recognition software.  Any grammatical or context distortions are unintentional and inherent to the software.      Again, thank you for allowing me to participate in the care of your patient.        Sincerely,        King Taylor MD

## 2023-10-02 ENCOUNTER — MYC MEDICAL ADVICE (OUTPATIENT)
Dept: NEUROLOGY | Facility: CLINIC | Age: 48
End: 2023-10-02
Payer: COMMERCIAL

## 2023-10-02 DIAGNOSIS — G43.719 INTRACTABLE CHRONIC MIGRAINE WITHOUT AURA AND WITHOUT STATUS MIGRAINOSUS: Primary | ICD-10-CM

## 2023-10-02 DIAGNOSIS — G47.00 INSOMNIA, UNSPECIFIED TYPE: ICD-10-CM

## 2023-10-02 RX ORDER — GABAPENTIN 100 MG/1
100 CAPSULE ORAL
Qty: 90 CAPSULE | Refills: 1 | Status: SHIPPED | OUTPATIENT
Start: 2023-10-02 | End: 2024-01-31

## 2023-10-05 DIAGNOSIS — Z30.011 INITIATION OF OCP (BCP): ICD-10-CM

## 2023-10-05 RX ORDER — DROSPIRENONE AND ETHINYL ESTRADIOL 0.03MG-3MG
KIT ORAL
Qty: 112 TABLET | Refills: 0 | Status: SHIPPED | OUTPATIENT
Start: 2023-10-05 | End: 2024-01-04

## 2023-11-01 ENCOUNTER — PATIENT OUTREACH (OUTPATIENT)
Dept: GASTROENTEROLOGY | Facility: CLINIC | Age: 48
End: 2023-11-01
Payer: COMMERCIAL

## 2023-11-01 DIAGNOSIS — Z12.11 SPECIAL SCREENING FOR MALIGNANT NEOPLASMS, COLON: Primary | ICD-10-CM

## 2023-11-01 NOTE — PROGRESS NOTES
"CRC Screening Colonoscopy Referral Review    Patient meets the inclusion criteria for screening colonoscopy standing order.    Ordering/Referring Provider:  Dr. Dahlia Walton    BMI: Estimated body mass index is 22.85 kg/m  as calculated from the following:    Height as of 6/20/23: 1.6 m (5' 3\").    Weight as of 9/29/23: 58.5 kg (129 lb).     Sedation:  Does patient have any of the following conditions affecting sedation?  No medical conditions affecting sedation.    Previous Scopes:  Any previous recommendations or follow up needs based on previous scope?  na / No recommendations.    Medical Concerns to Postpone Order:  Does patient have any of the following medical concerns that should postpone/delay colonoscopy referral?  No medical conditions affecting colonoscopy referral.    Final Referral Details:  Based on patient's medical history patient is appropriate for referral order with moderate sedation. If patient's BMI > 50 do not schedule in [facility].      "

## 2023-11-14 DIAGNOSIS — G43.719 INTRACTABLE CHRONIC MIGRAINE WITHOUT AURA AND WITHOUT STATUS MIGRAINOSUS: ICD-10-CM

## 2023-11-15 RX ORDER — CYCLOBENZAPRINE HCL 5 MG
5 TABLET ORAL 3 TIMES DAILY PRN
Qty: 60 TABLET | Refills: 0 | Status: SHIPPED | OUTPATIENT
Start: 2023-11-15 | End: 2024-04-24

## 2023-11-15 NOTE — TELEPHONE ENCOUNTER
Refill request for:   -     cyclobenzaprine (FLEXERIL) 5 MG tablet; Take 1 tablet (5 mg) by mouth 3 times daily as needed for muscle spasms     LOV: 8/1/23  NOV: 12/6/23    #60 with 0 refills Medication T'd for review and signature  Christy Arellano CMA on 11/15/2023 at 12:03 PM  [facility]

## 2023-12-03 ENCOUNTER — HEALTH MAINTENANCE LETTER (OUTPATIENT)
Age: 48
End: 2023-12-03

## 2023-12-21 ENCOUNTER — TRANSFERRED RECORDS (OUTPATIENT)
Dept: HEALTH INFORMATION MANAGEMENT | Facility: CLINIC | Age: 48
End: 2023-12-21
Payer: COMMERCIAL

## 2023-12-30 DIAGNOSIS — Z30.011 INITIATION OF OCP (BCP): ICD-10-CM

## 2024-01-02 NOTE — PROGRESS NOTES
"NEUROLOGY OUTPATIENT PROGRESS NOTE (VIDEO)  Oct 2, 2020     CHIEF COMPLAINT/REASON FOR VISIT/REASON FOR CONSULT  Patient presents with:  Headache    REASON FOR CONSULTATION- Headaches    Video Visit Consent  Patient is being evaluated via a billable video visit. The patient has been notified of following:   \"This video visit will be conducted via a call between you and your physician/provider. We have found that certain health care needs can be provided without the need for an in-person physical exam. This service lets us provide the care you need with a video conversation. If a prescription is necessary we can send it directly to your pharmacy. If lab work is needed we can place an order for that and you can then stop by our lab to have the test done at a later time.  If during the course of the call the physician/provider feels a video visit is not appropriate, you will not be charged for this service.  Physician has received verbal consent for a Video Visit from the patient? YES  Patient would like the video invitation sent by: Email/SMS    Video Visit Details  Type of service: Video Visit  Video Start Time: 12:28  Video End Time (time video stopped): 12:47  Originating Location (pt. Location): Patient's Home  Distant Location (provider location): [facility] [place]   Mode of Communication: Video Conference via [facility]      HISTORY OF PRESENT ILLNESS  Shyann Jurado is a 45 year old female seen today for headaches. She reports that her headache started about 2-1/2 years ago. They have always been sporadic once or twice a month. No clear triggers for the headaches have been identified. To be related to birth control and she was switched from Irma to Sarah. This did not significantly help the headaches. There is some neck tension involved which could be leading to the headaches. Headaches generally are behind the right eye with some sharp pain photophobia and phonophobia. She does have a " history of a cerebral aneurysm identified on the MRI done for headaches. Headaches are thought to be unrelated to the aneurysm. She is currently followed by Dr. Álvarez who plans to do yearly MRI brain to evaluate for aneurysms. Imitrex 50 mg has been beneficial in the past. She is also been using Flexeril intermittently. There is some history of iron deficiency. She has difficulty sleeping and uses melatonin and Unisom. Tizanidine did not provide any benefit and patient was switched to Flexeril. She has had some hair loss side effects with the Topamax. Propanolol tried last time could not be tolerated because of side effects and she stopped the medication. Imitrex injections have also been prescribed for abortive therapy though she mainly uses the oral tablets.    Patient returns today reports that she is using 1 tablet of Flexeril every night. Headaches have significantly improved and she only has one headache in the last 6 weeks since I saw her. She used a combination of propanolol Flexeril Imitrex and caffeine as abortive therapy which worked well for her. Has gained some weight on the propanolol. Currently is off the propanolol on every day basis. Reports that her headache was at the time of the menstrual cycle. She is planning on seeing her OB/GYN may be change of birth control. Complains of some spotting. She is sleeping better with the Flexeril.    10/4/20  Patient returns today.  She reports that in the summer months she did not have any headaches no recently headaches have come back.  Headaches unchanged in nature.  The headache is still behind the right eye.  She wonders if the headaches might be related to her cycle being a bit off though is not completely sure what caused it.  There was some stress involved because her daughter tested positive for COVID though that should have made the headaches worse during summertime.  She did have one headache in September this started with right-sided neck tension  "and was slightly more severe than her baseline headaches.  She is using propanolol Flexeril Imitrex and caffeine for abortive therapy.  This combination seems to work for her.  Is not taking any preventive medication at this point.  Is using Flexeril to help with sleep.  Has rarely (about 2 times) needed to use the Imitrex injection.    She did receive a diagnostic angiogram and her aneurysm is stable.  This plans to do another angiogram in 1 year.    Previous history is reviewed and this is unchanged.    PAST MEDICAL/SURGICAL HISTORY  No past medical history on file.  Patient Active Problem List   Diagnosis     Vitamin D deficiency     Iron deficiency anemia     Insomnia     Hypothyroidism     History of colonic polyps     Fatigue     Chronic migraine     Brain aneurysm   Significant for high cholesterol, migraine headaches, depression, iron deficiency      FAMILY HISTORY  Family History   Problem Relation Age of Onset     Migraines Mother    Reviewed and negative for neurological conditions    SOCIAL HISTORY  Social History     Tobacco Use     Smoking status: Never Smoker     Smokeless tobacco: Never Used   Substance Use Topics     Alcohol use: Not Currently     Drug use: None       SYSTEMS REVIEW  Twelve-system ROS was done and other than the HPI this was negative.     MEDICATIONS       drospirenone-ethinyl estradiol (ADRIÁN) 3-0.03 MG tablet,        vitamin D2 (ERGOCALCIFEROL) 81590 units (1250 mcg) capsule, TK 1 C PO WEEKLY    No current facility-administered medications on file prior to visit.        PHYSICAL EXAMINATION  VITALS: Ht 1.6 m (5' 3\")   Wt 63.5 kg (140 lb)   BMI 24.80 kg/m    PHYSICAL EXAM  Exam was limited due to video encounter.    Vitals-Unable to do on video  GENERAL -Health appearing, No apparent distress  EYES- No scleral icterus, no eyelid droop, Pupils symmetric  HEENT - Normocephalic, atraumatic, Hearing grossly intact; Oral mucosa moist and pink in color. External Ears and nose intact. "   Neck - soft/flexible with normal ROM on visual inspection.  PULM - Good spontaneous respiratory effort;  CV- No edema on visual inspection  MSK- Gait - see Neuro section; Strength and tone- see Neuro section; Range of motion grossly intact.  Psych- Normal mood and affect. Good judgment and insight.     Neurological  Mental status - Patient is awake and oriented. Attention span is normal. Language is fluent and follows commands appropriately.   Cranial nerves - Pupils are symmetric; EOMI, NLF symmetric  Motor - There is no pronator drift. Antigravity in all 4 ext.  Tone - No evidence of rigidity on visual inspection. No tremor.  Reflexes - Unable to do on video  Sensation - Unable to do on Video  Coordination - Finger to nose without dysmetria.   Gait and station - Romberg is negative. Gait is steady      PREVIOUS EXAM  GENERAL: Healthy appearing, alert, no acute distress, normal habitus.  CARDIOVASCULAR: Ext warm and well perfused. Pulses present. NEUROLOGICAL: Patient is awake and oriented to self, place and time. Attention span is normal. Memory grossly intact. Language is fluent and follows commands appropriately. Appropriate fund of knowledge. Cranial nerves 2-12 are intact. There is no pronator drift. Motor exam shows 5/5 strength in all extremities. Tone is symmetric bilaterally in upper and lower extremities. Finger to nose is without dysmetria. Gait is normal and the patient is able to do tandem walk.    DIAGNOSTICS  DSA  Conically-shaped right internal carotid artery superior hypophyseal   aneurysm measures 1.3 mm deep by 1.7 mm x 2.1 mm across. The aneurysm neck   measures up to 2.1 mm in maximum dimension. There is very mild fusiform   ectasia of the paraclinoid segment   giving rise to this more focal saccular aneurysm.    DSA 2020  1. Stable appearance of the 1.3 x 1.7 x 2.1 mm right internal carotid artery superior hypophyseal aneurysm with a 2.1 mm neck with adjacent mild parent vessel  ectasia.    2. No evidence of new intracranial aneurysm.    Results were discussed with the patient and conveyed to the neurosurgical team immediately following the procedure.    Evaluation and stenosis determination based on NASCET criteria.    MRI  HEAD MRI:  1.  Negative brain MRI. No acute intracranial finding. No evidence for recent  ischemia, intracranial hemorrhage, or mass.     HEAD MRA:  1.  Approximately 2 mm outpouching in the region of the right carotid cave may  reflect a small aneurysm. This is near the dural rings and could be intradural  in location.  2.  Otherwise negative brain MRA.        RELEVANT LABS  Ferritin 67    IMPRESSION/REPORT/PLAN  Insomnia, unspecified type  Chronic migraine  Cerebral aneurysm, nonruptured      This is a 45 year old female chronic migraines, unrelated cerebral aneurysm, insomnia. Headaches have improved to less than one headache a month with using Flexeril at nighttime. She can continue that treatment. She can use Imitrex, Flexeril, propanolol, caffeine for abortive therapy. I am not sure how much the propanolol is helping as abortive therapy.  We will refill her medications.  Encouraged her to keep a log to see if she can find a pattern to see why the headaches might be getting worse at this point.  I will see her back in 6 months.  Further cerebral aneurysm she would need yearly scans though I think a MRA would be sufficient and she does not need a full diagnostic angiogram.  She is following up with neurosurgery regarding these aneurysms.    Other recommendations in the past include:-In terms of her birth control encouraged her to talk to her OB/GYN doctor. The progesterone is probably what is causing the spotting that she is having. We did discuss the changes in both control might make the headaches worse.  Patient seems to be sensitive to medication and Botox could be an option. CGRP antagonist could also be used if her headaches are more often.      -      SUMAtriptan (IMITREX) 6 MG/0.5ML injection; Inject 0.5 mLs (6 mg) Subcutaneous as needed for migraine (Headache)  -     SUMAtriptan (IMITREX) 50 MG tablet; Take 1 tablet (50 mg) by mouth at onset of headache for migraine  -     cyclobenzaprine (FLEXERIL) 5 MG tablet; Take 1 tablet (5 mg) by mouth 3 times daily as needed for muscle spasms      Return in about 6 months (around 4/2/2021) for Virtual or clinic.    Over 25 minutes were spent coordinating the care for the patient today.  More than 50% of the time was spent counseling, educating the patient.    King Taylor MD  Neurologist  [facility]  Tel:- [phone]    This note was dictated using voice recognition software.  Any grammatical or context distortions are unintentional and inherent to the software.     4

## 2024-01-04 RX ORDER — DROSPIRENONE AND ETHINYL ESTRADIOL 0.03MG-3MG
KIT ORAL
Qty: 112 TABLET | Refills: 0 | Status: SHIPPED | OUTPATIENT
Start: 2024-01-04 | End: 2024-03-25

## 2024-01-31 ENCOUNTER — OFFICE VISIT (OUTPATIENT)
Dept: FAMILY MEDICINE | Facility: CLINIC | Age: 49
End: 2024-01-31
Payer: COMMERCIAL

## 2024-01-31 ENCOUNTER — ANCILLARY PROCEDURE (OUTPATIENT)
Dept: GENERAL RADIOLOGY | Facility: CLINIC | Age: 49
End: 2024-01-31
Attending: FAMILY MEDICINE
Payer: COMMERCIAL

## 2024-01-31 VITALS
DIASTOLIC BLOOD PRESSURE: 66 MMHG | BODY MASS INDEX: 21.12 KG/M2 | HEIGHT: 63 IN | HEART RATE: 88 BPM | SYSTOLIC BLOOD PRESSURE: 98 MMHG | RESPIRATION RATE: 16 BRPM | WEIGHT: 119.2 LBS

## 2024-01-31 DIAGNOSIS — Z00.00 ENCOUNTER FOR PREVENTATIVE ADULT HEALTH CARE EXAMINATION: Primary | ICD-10-CM

## 2024-01-31 DIAGNOSIS — D50.8 OTHER IRON DEFICIENCY ANEMIA: ICD-10-CM

## 2024-01-31 DIAGNOSIS — Z82.62 FAMILY HISTORY OF OSTEOPOROSIS: ICD-10-CM

## 2024-01-31 DIAGNOSIS — E55.9 VITAMIN D DEFICIENCY: ICD-10-CM

## 2024-01-31 DIAGNOSIS — R06.02 SHORTNESS OF BREATH: ICD-10-CM

## 2024-01-31 DIAGNOSIS — R07.89 CHEST PRESSURE: ICD-10-CM

## 2024-01-31 DIAGNOSIS — Z12.31 VISIT FOR SCREENING MAMMOGRAM: ICD-10-CM

## 2024-01-31 DIAGNOSIS — R79.82 ELEVATED C-REACTIVE PROTEIN (CRP): ICD-10-CM

## 2024-01-31 DIAGNOSIS — I67.1 BRAIN ANEURYSM: ICD-10-CM

## 2024-01-31 DIAGNOSIS — E03.8 OTHER SPECIFIED HYPOTHYROIDISM: ICD-10-CM

## 2024-01-31 DIAGNOSIS — G43.719 INTRACTABLE CHRONIC MIGRAINE WITHOUT AURA AND WITHOUT STATUS MIGRAINOSUS: ICD-10-CM

## 2024-01-31 PROBLEM — Z86.0100 HISTORY OF COLONIC POLYPS: Status: ACTIVE | Noted: 2018-07-12

## 2024-01-31 LAB
ERYTHROCYTE [DISTWIDTH] IN BLOOD BY AUTOMATED COUNT: 13 % (ref 10–15)
ERYTHROCYTE [SEDIMENTATION RATE] IN BLOOD BY WESTERGREN METHOD: 14 MM/HR (ref 0–30)
HCT VFR BLD AUTO: 40.5 % (ref 35–47)
HGB BLD-MCNC: 13.2 G/DL (ref 11.7–15.7)
MCH RBC QN AUTO: 28 PG (ref 26.5–33)
MCHC RBC AUTO-ENTMCNC: 32.6 G/DL (ref 31.5–36.5)
MCV RBC AUTO: 86 FL (ref 78–100)
PLATELET # BLD AUTO: 264 10E3/UL (ref 150–450)
RBC # BLD AUTO: 4.71 10E6/UL (ref 3.8–5.2)
WBC # BLD AUTO: 8.1 10E3/UL (ref 4–11)

## 2024-01-31 PROCEDURE — 82306 VITAMIN D 25 HYDROXY: CPT | Performed by: FAMILY MEDICINE

## 2024-01-31 PROCEDURE — 82728 ASSAY OF FERRITIN: CPT | Performed by: FAMILY MEDICINE

## 2024-01-31 PROCEDURE — 90750 HZV VACC RECOMBINANT IM: CPT | Performed by: FAMILY MEDICINE

## 2024-01-31 PROCEDURE — 84443 ASSAY THYROID STIM HORMONE: CPT | Performed by: FAMILY MEDICINE

## 2024-01-31 PROCEDURE — 80061 LIPID PANEL: CPT | Performed by: FAMILY MEDICINE

## 2024-01-31 PROCEDURE — 86140 C-REACTIVE PROTEIN: CPT | Performed by: FAMILY MEDICINE

## 2024-01-31 PROCEDURE — 93010 ELECTROCARDIOGRAM REPORT: CPT | Performed by: INTERNAL MEDICINE

## 2024-01-31 PROCEDURE — 90471 IMMUNIZATION ADMIN: CPT | Performed by: FAMILY MEDICINE

## 2024-01-31 PROCEDURE — 86038 ANTINUCLEAR ANTIBODIES: CPT | Performed by: FAMILY MEDICINE

## 2024-01-31 PROCEDURE — 85652 RBC SED RATE AUTOMATED: CPT | Performed by: FAMILY MEDICINE

## 2024-01-31 PROCEDURE — 83540 ASSAY OF IRON: CPT | Performed by: FAMILY MEDICINE

## 2024-01-31 PROCEDURE — 90472 IMMUNIZATION ADMIN EACH ADD: CPT | Performed by: FAMILY MEDICINE

## 2024-01-31 PROCEDURE — 80053 COMPREHEN METABOLIC PANEL: CPT | Performed by: FAMILY MEDICINE

## 2024-01-31 PROCEDURE — 83550 IRON BINDING TEST: CPT | Performed by: FAMILY MEDICINE

## 2024-01-31 PROCEDURE — 99214 OFFICE O/P EST MOD 30 MIN: CPT | Mod: 25 | Performed by: FAMILY MEDICINE

## 2024-01-31 PROCEDURE — 36415 COLL VENOUS BLD VENIPUNCTURE: CPT | Performed by: FAMILY MEDICINE

## 2024-01-31 PROCEDURE — 90715 TDAP VACCINE 7 YRS/> IM: CPT | Performed by: FAMILY MEDICINE

## 2024-01-31 PROCEDURE — 93005 ELECTROCARDIOGRAM TRACING: CPT | Mod: 59 | Performed by: FAMILY MEDICINE

## 2024-01-31 PROCEDURE — 99396 PREV VISIT EST AGE 40-64: CPT | Mod: 25 | Performed by: FAMILY MEDICINE

## 2024-01-31 PROCEDURE — 85027 COMPLETE CBC AUTOMATED: CPT | Performed by: FAMILY MEDICINE

## 2024-01-31 PROCEDURE — 71046 X-RAY EXAM CHEST 2 VIEWS: CPT | Mod: TC | Performed by: RADIOLOGY

## 2024-01-31 RX ORDER — NYSTATIN 500000 [USP'U]/1
TABLET, COATED ORAL
COMMUNITY
Start: 2024-01-30

## 2024-01-31 RX ORDER — AZITHROMYCIN 500 MG/1
TABLET, FILM COATED ORAL
COMMUNITY

## 2024-01-31 SDOH — HEALTH STABILITY: PHYSICAL HEALTH: ON AVERAGE, HOW MANY DAYS PER WEEK DO YOU ENGAGE IN MODERATE TO STRENUOUS EXERCISE (LIKE A BRISK WALK)?: 1 DAY

## 2024-01-31 ASSESSMENT — SOCIAL DETERMINANTS OF HEALTH (SDOH): HOW OFTEN DO YOU GET TOGETHER WITH FRIENDS OR RELATIVES?: ONCE A WEEK

## 2024-01-31 NOTE — PROGRESS NOTES
Preventive Care Visit  [facility]  Dahlia Walton MD, Family Medicine  Jan 31, 2024    Assessment & Plan       ICD-10-CM    1. Encounter for preventative adult health care examination  Z00.00 Lipid panel     Lipid panel      2. Family history of osteoporosis  Z82.62 DX Hip/Pelvis/Spine      3. Vitamin D deficiency  E55.9       4. Other specified hypothyroidism  E03.8 Vitamin D Deficiency     TSH with free T4 reflex     Vitamin D Deficiency     TSH with free T4 reflex      5. Brain aneurysm  I67.1       6. Other iron deficiency anemia  D50.8 Iron and iron binding capacity     Ferritin     CBC with platelets     Iron and iron binding capacity     Ferritin     CBC with platelets      7. Chest pressure  R07.89 Comprehensive metabolic panel (BMP + Alb, Alk Phos, ALT, AST, Total. Bili, TP)     EKG 12-lead, tracing only     Comprehensive metabolic panel (BMP + Alb, Alk Phos, ALT, AST, Total. Bili, TP)      8. Shortness of breath  R06.02 XR Chest 2 Views      9. Visit for screening mammogram  Z12.31 MA Screen Bilateral w/Jatin      10. Intractable chronic migraine without aura and without status migrainosus  G43.719 Ob/Gyn  Referral      11. Elevated C-reactive protein (CRP)  R79.82 Anti Nuclear Beatriz IgG by IFA with Reflex     CRP inflammation     Erythrocyte sedimentation rate auto     Anti Nuclear Beatriz IgG by IFA with Reflex     CRP inflammation     Erythrocyte sedimentation rate auto        Order a bone density scan with your family history of osteoporosis.  Please check on insurance coverage.    I am placing referral to [facility] to discuss a possible oophorectomy or removal of your ovaries because of the hormonally mediated migraines.    I am glad you are also seeing neurology.    Patient is following with a lung doctor and will finish that treatment.    If CRP or sed rate are elevated today we may want to check that after your Lyme treatment maybe a few months  after.    Today and check an EKG and chest x-ray for the chest pressure and mild shortness of breath.      If the shortness of breath gets worse would want pulmonary function testing.    If you are feeling chest pain or pressure or tightness on exertion I do want to get a stress test.      40 minutes spent by me on the date of the encounter doing chart review, history and exam, documentation and further activities per the note      Counseling  Appropriate preventive services were discussed with this patient, including applicable screening as appropriate for fall prevention, nutrition, physical activity, Tobacco-use cessation, weight loss and cognition.  Checklist reviewing preventive services available has been given to the patient.  Reviewed patient's diet, addressing concerns and/or questions.   She is at risk for lack of exercise and has been provided with information to increase physical activity for the benefit of her well-being.     Patient has been advised of split billing requirements and indicates understanding: Yes        Subjective   Shyann is a 48 year old, presenting for the following:  Physical (Pt is fasting today, pt would like to talk about migraines and hysterectomy and possibly a bone scan and EKG. )    Health Maintenance   Topic Date Due    INFLUENZA VACCINE (1) Declined    COVID-19 Vaccine (4 - 2023-24 season) Declined    YEARLY PREVENTIVE VISIT  Today    ANNUAL REVIEW OF HM ORDERS  Today    ZOSTER IMMUNIZATION (1 of 2) Today, # 2 in 2-6 months    DTAP/TDAP/TD IMMUNIZATION (3 - Td or Tdap) 04/14/2024, Today    MAMMO SCREENING  03/06/2024, ordered    GLUCOSE  Today    HPV TEST  05/19/2026    ADVANCE CARE PLANNING  We would like a copy please    PAP  05/19/2026    LIPID  Today    COLORECTAL CANCER SCREENING  12/21/2028    PHQ-2 (once per calendar year)  Completed    HEPATITIS B IMMUNIZATION  Completed    Pneumococcal Vaccine: Pediatrics (0 to 5 Years) and At-Risk Patients (6 to 64 Years)  Age 65     IPV IMMUNIZATION  Aged Out    HPV IMMUNIZATION  Aged Out    MENINGITIS IMMUNIZATION  Aged Out    RSV MONOCLONAL ANTIBODY  Aged Out    HEPATITIS C SCREENING  Discontinued    HIV SCREENING  Discontinued           1/31/2024    11:04 AM   Additional Questions   Roomed by Jacquelin Cristobal CMA        Health Care Directive  Patient does not have a Health Care Directive or Living Will: Patient states has Advance Directive and will bring in a copy to clinic.  HPI    Spotting:  Contiuse birth cotrol. And period every 3 mohths.  Spotting Spottin on and off in the 3 mohts, not cylical.  ON OCP for ovarina cysts.    Migraine:  Got worse toward the end of last year.  Started seeing a new neurologist.  Stopped Botox injection, swithed to triiger point injection and steroid injection and Ubrevly and had helped. Some may be hormonal related as she gets thme aroun her cycle.      Family history of osteoporosis in her mother: Wonders about thing a Dexa scan.  Calcium irritates her stomache, She does not do a lot of strength training.     Lyme:  Last summer diagnosed and treated for Lyme with another provider.  Took a week of Azithromycin and some oral antivitrlal.  He was not able to finish the azithromycin treatment and she will be finishing that with provider who started that.  Lyme doctor recommended primary doctor get a EKG.  Also her CRP has been elevated with them and that provider recommended rechecking at our clinic.  I told her that could be up if she does have Lyme and has not the treatment.  Denies joint pain.    Chest tightening and shortness of breath: Having shortness of breath with stairs for 1 month.  No wheezing.  No history of asthma.  She will have random chest pressure not necessarily on exertion or rest.    Per patient she did a cancer blood testing and negative per patient.    Brain aneurysm:  Followed by neurosurgery.                  1/31/2024   General Health   How would you rate your overall physical health?  Good   Feel stress (tense, anxious, or unable to sleep) Rather much   (!) STRESS CONCERN      1/31/2024   Nutrition   Three or more servings of calcium each day? (!) NO   Diet: Regular (no restrictions)   How many servings of fruit and vegetables per day? (!) 0-1   How many sweetened beverages each day? 0-1         1/31/2024   Exercise   Days per week of moderate/strenous exercise 1 day   (!) EXERCISE CONCERN      1/31/2024   Social Factors   Frequency of gathering with friends or relatives Once a week   Worry food won't last until get money to buy more No   Food not last or not have enough money for food? No   Do you have housing?  Yes   Are you worried about losing your housing? No   Lack of transportation? No   Unable to get utilities (heat,electricity)? No         1/31/2024   Fall Risk   Fallen 2 or more times in the past year? No   Trouble with walking or balance? No          1/31/2024   Dental   Dentist two times every year? Yes         1/31/2024   TB Screening   Were you born outside of [place]?  No         Today's PHQ-2 Score:       1/31/2024    11:01 AM   PHQ-2 ( 1999 Pf[employer])   Q1: Little interest or pleasure in doing things 0   Q2: Feeling down, depressed or hopeless 0   PHQ-2 Score 0   Q1: Little interest or pleasure in doing things Not at all   Q2: Feeling down, depressed or hopeless Not at all   PHQ-2 Score 0           1/31/2024   Substance Use   Alcohol more than 3/day or more than 7/wk No   Do you use any other substances recreationally? No     Social History     Tobacco Use    Smoking status: Never    Smokeless tobacco: Never   Substance Use Topics    Alcohol use: Not Currently    Drug use: Never           8/31/2022   LAST FHS-7 RESULTS   1st degree relative breast or ovarian cancer No   Any relative bilateral breast cancer No   Any male have breast cancer No   Any woman have breast and ovarian cancer No   Any woman with breast cancer before 50yrs No   2 or more relatives with breast and/or ovarian cancer  "No   2 or more relatives with breast and/or bowel cancer No        Annual screen by mutual decision with patient    1/31/2024   STI Screening   New sexual partner(s) since last STI/HIV test? No     History of abnormal Pap smear: NO - age 30-65 PAP every 5 years with negative HPV co-testing recommended        Latest Ref Rng & Units 5/19/2021     2:20 PM 1/30/2018    10:34 AM   PAP / HPV   PAP Negative for squamous intraepithelial lesion or malignancy. Negative for squamous intraepithelial lesion or malignancy  Electronically signed by Sheila Valdovinos CT (ASCP) on 5/26/2021 at 11:50 AM    Negative for squamous intraepithelial lesion or malignancy  Electronically signed by Yuko Kern CT (ASCP) on 2/5/2018 at  2:40 PM      HPV 16 DNA NEG Negative  Negative    HPV 18 DNA NEG Negative  Negative    Other HR HPV NEG Negative  Negative      The 10-year ASCVD risk score (Kylah MONTOYA, et al., 2019) is: 0.6%    Values used to calculate the score:      Age: 48 years      Sex: Female      Is Non- : No      Diabetic: No      Tobacco smoker: No      Systolic Blood Pressure: 98 mmHg      Is BP treated: No      HDL Cholesterol: 67 mg/dL      Total Cholesterol: 214 mg/dL            1/31/2024   Contraception/Family Planning   Questions about contraception or family planning No        Reviewed and updated as needed this visit by Provider                      Review of Systems       Objective    Exam  BP 98/66 (BP Location: Left arm, Patient Position: Sitting, Cuff Size: Adult Regular)   Pulse 88   Resp 16   Ht 1.6 m (5' 3\")   Wt 54.1 kg (119 lb 3.2 oz)   BMI 21.12 kg/m     Estimated body mass index is 21.12 kg/m  as calculated from the following:    Height as of this encounter: 1.6 m (5' 3\").    Weight as of this encounter: 54.1 kg (119 lb 3.2 oz).  Physical Exam  GENERAL: alert and no distress  EYES: Eyes grossly normal to inspection, PERRL and conjunctivae and sclerae normal  HENT: ear canals " and TM's normal, nose and mouth without ulcers or lesions  NECK: no adenopathy, no asymmetry, masses, or scars  RESP: lungs clear to auscultation - no rales, rhonchi or wheezes  BREAST: normal without masses, tenderness or nipple discharge and no palpable axillary masses or adenopathy  CV: regular rate and rhythm, normal S1 S2, no S3 or S4, no murmur, click or rub, no peripheral edema  ABDOMEN: soft, nontender, no hepatosplenomegaly, no masses and bowel sounds normal  MS: no gross musculoskeletal defects noted, no edema  SKIN: no suspicious lesions or rashes  NEURO: Normal strength and tone, mentation intact and speech normal  PSYCH: mentation appears normal, affect normal/bright  LYMPH: no cervical, supraclavicular, axillary, or inguinal adenopathy      Signed Electronically by: Dahlia Walton MD

## 2024-01-31 NOTE — PATIENT INSTRUCTIONS
Order a bone density scan with your family history of osteoporosis.  Please check on insurance coverage.    I am placing referral to [facility] to discuss a possible oophorectomy or removal of your ovaries because of the hormonally mediated migraines.    I am glad you are also seeing neurology.    Patient is following with a lung doctor and will finish that treatment.    If CRP or sed rate are elevated today we may want to check that after your Lyme treatment maybe a few months after.    Today and check an EKG and chest x-ray for the chest pressure and mild shortness of breath.      If the shortness of breath gets worse would want pulmonary function testing.    If you are feeling chest pain or pressure or tightness on exertion I do want to get a stress test.          Health Maintenance   Topic Date Due    INFLUENZA VACCINE (1) Declined    COVID-19 Vaccine (4 - 2023-24 season) Declined    YEARLY PREVENTIVE VISIT  Today    ANNUAL REVIEW OF HM ORDERS  Today    ZOSTER IMMUNIZATION (1 of 2) Today, # 2 in 2-6 months    DTAP/TDAP/TD IMMUNIZATION (3 - Td or Tdap) 04/14/2024, Today    MAMMO SCREENING  03/06/2024, ordered    GLUCOSE  Today    HPV TEST  05/19/2026    ADVANCE CARE PLANNING  We would like a copy please    PAP  05/19/2026    LIPID  Today    COLORECTAL CANCER SCREENING  12/21/2028    PHQ-2 (once per calendar year)  Completed    HEPATITIS B IMMUNIZATION  Completed    Pneumococcal Vaccine: Pediatrics (0 to 5 Years) and At-Risk Patients (6 to 64 Years)  Age 65    IPV IMMUNIZATION  Aged Out    HPV IMMUNIZATION  Aged Out    MENINGITIS IMMUNIZATION  Aged Out    RSV MONOCLONAL ANTIBODY  Aged Out    HEPATITIS C SCREENING  Discontinued    HIV SCREENING  Discontinued

## 2024-02-01 LAB
ALBUMIN SERPL BCG-MCNC: 4.6 G/DL (ref 3.5–5.2)
ALP SERPL-CCNC: 68 U/L (ref 40–150)
ALT SERPL W P-5'-P-CCNC: 13 U/L (ref 0–50)
ANA SER QL IF: NEGATIVE
ANION GAP SERPL CALCULATED.3IONS-SCNC: 12 MMOL/L (ref 7–15)
AST SERPL W P-5'-P-CCNC: 18 U/L (ref 0–45)
ATRIAL RATE - MUSE: 73 BPM
BILIRUB SERPL-MCNC: 0.6 MG/DL
BUN SERPL-MCNC: 11.4 MG/DL (ref 6–20)
CALCIUM SERPL-MCNC: 9.7 MG/DL (ref 8.6–10)
CHLORIDE SERPL-SCNC: 103 MMOL/L (ref 98–107)
CHOLEST SERPL-MCNC: 264 MG/DL
CREAT SERPL-MCNC: 0.76 MG/DL (ref 0.51–0.95)
CRP SERPL-MCNC: 12 MG/L
DEPRECATED HCO3 PLAS-SCNC: 22 MMOL/L (ref 22–29)
DIASTOLIC BLOOD PRESSURE - MUSE: NORMAL MMHG
EGFRCR SERPLBLD CKD-EPI 2021: >90 ML/MIN/1.73M2
FASTING STATUS PATIENT QL REPORTED: YES
FERRITIN SERPL-MCNC: 441 NG/ML (ref 6–175)
GLUCOSE SERPL-MCNC: 78 MG/DL (ref 70–99)
HDLC SERPL-MCNC: 85 MG/DL
INTERPRETATION ECG - MUSE: NORMAL
IRON BINDING CAPACITY (ROCHE): 356 UG/DL (ref 240–430)
IRON SATN MFR SERPL: 21 % (ref 15–46)
IRON SERPL-MCNC: 75 UG/DL (ref 37–145)
LDLC SERPL CALC-MCNC: 152 MG/DL
NONHDLC SERPL-MCNC: 179 MG/DL
P AXIS - MUSE: 69 DEGREES
POTASSIUM SERPL-SCNC: 4.3 MMOL/L (ref 3.4–5.3)
PR INTERVAL - MUSE: 128 MS
PROT SERPL-MCNC: 7.9 G/DL (ref 6.4–8.3)
QRS DURATION - MUSE: 74 MS
QT - MUSE: 394 MS
QTC - MUSE: 434 MS
R AXIS - MUSE: 47 DEGREES
SODIUM SERPL-SCNC: 137 MMOL/L (ref 135–145)
SYSTOLIC BLOOD PRESSURE - MUSE: NORMAL MMHG
T AXIS - MUSE: 24 DEGREES
TRIGL SERPL-MCNC: 135 MG/DL
TSH SERPL DL<=0.005 MIU/L-ACNC: 3.81 UIU/ML (ref 0.3–4.2)
VENTRICULAR RATE- MUSE: 73 BPM
VIT D+METAB SERPL-MCNC: 82 NG/ML (ref 20–50)

## 2024-03-04 ENCOUNTER — MYC MEDICAL ADVICE (OUTPATIENT)
Dept: FAMILY MEDICINE | Facility: CLINIC | Age: 49
End: 2024-03-04
Payer: COMMERCIAL

## 2024-03-04 DIAGNOSIS — F41.9 ANXIETY: Primary | ICD-10-CM

## 2024-03-04 NOTE — TELEPHONE ENCOUNTER
Please call patient and ensure she is doing okay.  If she is needing a medication like Xanax she does need an in person visit.  I am had may have some openings Wednesday as my induction has been moved to Friday.  If she is wishing an appointment let me know and I will open up a spot.  Dahlia Walton MD

## 2024-03-04 NOTE — TELEPHONE ENCOUNTER
Called patient,    Assisted with scheduling an appt on Wednesday per PCP recommendation.    Patient feels safe at home, no concerns of SI/HI.    Kojo Richter RN     [facility]

## 2024-03-06 ENCOUNTER — OFFICE VISIT (OUTPATIENT)
Dept: FAMILY MEDICINE | Facility: CLINIC | Age: 49
End: 2024-03-06
Payer: COMMERCIAL

## 2024-03-06 VITALS
SYSTOLIC BLOOD PRESSURE: 119 MMHG | HEIGHT: 63 IN | OXYGEN SATURATION: 100 % | RESPIRATION RATE: 16 BRPM | DIASTOLIC BLOOD PRESSURE: 75 MMHG | BODY MASS INDEX: 20.27 KG/M2 | HEART RATE: 94 BPM | WEIGHT: 114.4 LBS

## 2024-03-06 DIAGNOSIS — F41.9 ANXIETY: Primary | ICD-10-CM

## 2024-03-06 PROCEDURE — 99213 OFFICE O/P EST LOW 20 MIN: CPT | Performed by: FAMILY MEDICINE

## 2024-03-06 RX ORDER — ALPRAZOLAM 0.5 MG
TABLET ORAL
Qty: 30 TABLET | Refills: 0 | Status: SHIPPED | OUTPATIENT
Start: 2024-03-06

## 2024-03-06 ASSESSMENT — ANXIETY QUESTIONNAIRES
GAD7 TOTAL SCORE: 9
IF YOU CHECKED OFF ANY PROBLEMS ON THIS QUESTIONNAIRE, HOW DIFFICULT HAVE THESE PROBLEMS MADE IT FOR YOU TO DO YOUR WORK, TAKE CARE OF THINGS AT HOME, OR GET ALONG WITH OTHER PEOPLE: SOMEWHAT DIFFICULT
2. NOT BEING ABLE TO STOP OR CONTROL WORRYING: MORE THAN HALF THE DAYS
3. WORRYING TOO MUCH ABOUT DIFFERENT THINGS: MORE THAN HALF THE DAYS
GAD7 TOTAL SCORE: 9
4. TROUBLE RELAXING: MORE THAN HALF THE DAYS
GAD7 TOTAL SCORE: 9
6. BECOMING EASILY ANNOYED OR IRRITABLE: NOT AT ALL
1. FEELING NERVOUS, ANXIOUS, OR ON EDGE: MORE THAN HALF THE DAYS
7. FEELING AFRAID AS IF SOMETHING AWFUL MIGHT HAPPEN: SEVERAL DAYS
7. FEELING AFRAID AS IF SOMETHING AWFUL MIGHT HAPPEN: SEVERAL DAYS
8. IF YOU CHECKED OFF ANY PROBLEMS, HOW DIFFICULT HAVE THESE MADE IT FOR YOU TO DO YOUR WORK, TAKE CARE OF THINGS AT HOME, OR GET ALONG WITH OTHER PEOPLE?: SOMEWHAT DIFFICULT
5. BEING SO RESTLESS THAT IT IS HARD TO SIT STILL: NOT AT ALL

## 2024-03-06 ASSESSMENT — ENCOUNTER SYMPTOMS: NERVOUS/ANXIOUS: 1

## 2024-03-06 ASSESSMENT — PATIENT HEALTH QUESTIONNAIRE - PHQ9
10. IF YOU CHECKED OFF ANY PROBLEMS, HOW DIFFICULT HAVE THESE PROBLEMS MADE IT FOR YOU TO DO YOUR WORK, TAKE CARE OF THINGS AT HOME, OR GET ALONG WITH OTHER PEOPLE: SOMEWHAT DIFFICULT
SUM OF ALL RESPONSES TO PHQ QUESTIONS 1-9: 4
SUM OF ALL RESPONSES TO PHQ QUESTIONS 1-9: 4

## 2024-03-06 NOTE — PATIENT INSTRUCTIONS
Prescribing Xanax the generic alprazolam 0.5 mg.  1/2-1 tab up to 3 times a day as needed for anxiety.  Not to be used with driving, operating heavy machinery or use with alcohol.  It can make you sleepy.    Giving 30 tabs 0 refills.    If you are needing 3 or more prescriptions of this in a year then we do need to do the controlled substance agreement and urine tox.    If you feel you are needing to add a daily medication on like Lexapro just let me know.    I feel counseling can be very helpful for anxiety if you are willing to do that.  I put some options below.      [facility]:   [facility] [phone]  [address] [place] [address]   This group also does pediatric ADHD evaluation    [facility]: [phone]  [address], [facility] [address]    [facility]: [phone]  [address], [place] [address]    [facility]:  [phone]   [address] [place] [address]  This group also does pediatric ADHD evaluation    [facility]:  [facility] [phone]   [address] [place] [address], [address]  This group also does pediatric ADHD evaluation    [place]:  [facility]: [phone]  [address]     [place]:  [facility]  [phone]  [address], [place] [address]    [facility] [phone]    [address], [facility]  [address]    [facility] /[phone]   [address], [address]    [facility] [phone] [address], [address]    [facility] [phone] [address]    [facility]:  [facility]  Psychiatrists and Psychologists  [phone]    [facility] [place]:  Erik  Adult Sexual Health Counselors:  Arcadio Brandt and Warren Freeman  [phone]    [facility]:  [facility]  Counselor that specializes in youth sexual health including  [facility]  [facility]  [phone]

## 2024-03-06 NOTE — PROGRESS NOTES
Assessment & Plan       ICD-10-CM    1. Anxiety  F41.9 ALPRAZolam (XANAX) 0.5 MG tablet        Prescribing Xanax the generic alprazolam 0.5 mg.  1/2-1 tab up to 3 times a day as needed for anxiety.  Not to be used with driving, operating heavy machinery or use with alcohol.  It can make you sleepy.    Giving 30 tabs 0 refills.    If you are needing 3 or more prescriptions of this in a year then we do need to do the controlled substance agreement and urine tox.    If you feel you are needing to add a daily medication on like Lexapro just let me know.    I feel counseling can be very helpful for anxiety if you are willing to do that.  I put some options in the after visit summary.      20 minutes spent by me on the date of the encounter doing chart review, history and exam, documentation and further activities per the note            Subjective   Shyann is a 48 year old, presenting for the following health issues:  Anxiety (PHQ and CASEY done today)        3/6/2024    12:44 PM   Additional Questions   Roomed by Jacquelin Cristobal CMA         2/27/2020    11:00 AM 2/22/2021     2:00 PM 3/6/2024    12:37 PM   PHQ   PHQ-9 Total Score 0 0 4   Q9: Thoughts of better off dead/self-harm past 2 weeks Not at all Not at all Not at all          3/6/2024    12:38 PM   CASEY-7 SCORE   Total Score 9 (mild anxiety)   Total Score 9        Anxiety    History of Present Illness       Mental Health Follow-up:  Patient presents to follow-up on Anxiety.    Patient's anxiety since last visit has been:  Worse  The patient is having other symptoms associated with anxiety.  Any significant life events: job concerns  Patient is feeling anxious or having panic attacks.  Patient has no concerns about alcohol or drug use.    She eats 2-3 servings of fruits and vegetables daily.She consumes 0 sweetened beverage(s) daily.She exercises with enough effort to increase her heart rate 10 to 19 minutes per day.  She exercises with enough effort to increase her  "heart rate 3 or less days per week.   She is taking medications regularly.     Anxiety:  Symptoms for about a month.  Will awake and feel anxious.  Has delt with anxiety in the past about 8-10 years ago.  Had been on Lexapro in the past, off for years. Feels like elctricity going through body, can't shut head off.  Can be paralyzing at times.  Her  is a physician and knows her well and thought possibly Xanax may be beneficial for her.  Other stressors include a work associate causing stress, son having a ski race in [place] and a daughter senior trip coming up.  She tried hydroxyzine did not seem to help her.  Denies depression.  No suicidal homicidal ideation.  She is losing some hair.              Objective    /75 (BP Location: Left arm, Patient Position: Sitting, Cuff Size: Adult Regular)   Pulse 94   Resp 16   Ht 1.6 m (5' 3\")   Wt 51.9 kg (114 lb 6.4 oz)   SpO2 100%   BMI 20.27 kg/m    Body mass index is 20.27 kg/m .  Physical Exam   GENERAL: alert and no distress  PSYCH: mentation appears normal, affect normal/bright            Signed Electronically by: Dahlia Walton MD    "

## 2024-03-07 ENCOUNTER — ANCILLARY PROCEDURE (OUTPATIENT)
Dept: MAMMOGRAPHY | Facility: HOSPITAL | Age: 49
End: 2024-03-07
Attending: FAMILY MEDICINE
Payer: COMMERCIAL

## 2024-03-07 ENCOUNTER — HOSPITAL ENCOUNTER (OUTPATIENT)
Dept: BONE DENSITY | Facility: HOSPITAL | Age: 49
Discharge: HOME | End: 2024-03-07
Attending: FAMILY MEDICINE
Payer: COMMERCIAL

## 2024-03-07 DIAGNOSIS — Z82.62 FAMILY HISTORY OF OSTEOPOROSIS: ICD-10-CM

## 2024-03-07 DIAGNOSIS — Z12.31 VISIT FOR SCREENING MAMMOGRAM: ICD-10-CM

## 2024-03-07 PROCEDURE — 77063 BREAST TOMOSYNTHESIS BI: CPT

## 2024-03-07 PROCEDURE — 77080 DXA BONE DENSITY AXIAL: CPT

## 2024-03-08 PROBLEM — M85.80 OSTEOPENIA: Status: ACTIVE | Noted: 2024-03-08

## 2024-03-23 RX ORDER — ESCITALOPRAM OXALATE 10 MG/1
10 TABLET ORAL DAILY
Qty: 90 TABLET | Refills: 3 | Status: SHIPPED | OUTPATIENT
Start: 2024-03-23

## 2024-03-24 DIAGNOSIS — Z30.011 INITIATION OF OCP (BCP): ICD-10-CM

## 2024-03-25 RX ORDER — DROSPIRENONE AND ETHINYL ESTRADIOL 0.03MG-3MG
KIT ORAL
Qty: 112 TABLET | Refills: 2 | Status: SHIPPED | OUTPATIENT
Start: 2024-03-25

## 2024-04-23 DIAGNOSIS — G43.719 INTRACTABLE CHRONIC MIGRAINE WITHOUT AURA AND WITHOUT STATUS MIGRAINOSUS: ICD-10-CM

## 2024-04-23 NOTE — TELEPHONE ENCOUNTER
PLEASE REVIEW    Refill request for: Flexeril    Directions: Take 1 tablet TID as needed     LOV:  Trigger Point 9/29/23  Botox 8/1/23  Botox 4/28/23  Office visit 4/4/23       NOV: None - DUE

## 2024-04-24 RX ORDER — CYCLOBENZAPRINE HCL 5 MG
5 TABLET ORAL 3 TIMES DAILY PRN
Qty: 30 TABLET | Refills: 0 | Status: SHIPPED | OUTPATIENT
Start: 2024-04-24

## 2024-10-11 ENCOUNTER — TRANSFERRED RECORDS (OUTPATIENT)
Dept: HEALTH INFORMATION MANAGEMENT | Facility: CLINIC | Age: 49
End: 2024-10-11

## 2024-10-29 ENCOUNTER — OFFICE VISIT (OUTPATIENT)
Dept: FAMILY MEDICINE | Facility: CLINIC | Age: 49
End: 2024-10-29
Payer: COMMERCIAL

## 2024-10-29 VITALS
TEMPERATURE: 99.2 F | RESPIRATION RATE: 16 BRPM | WEIGHT: 115.13 LBS | SYSTOLIC BLOOD PRESSURE: 91 MMHG | DIASTOLIC BLOOD PRESSURE: 62 MMHG | OXYGEN SATURATION: 99 % | BODY MASS INDEX: 20.4 KG/M2 | HEIGHT: 63 IN | HEART RATE: 102 BPM

## 2024-10-29 DIAGNOSIS — Z01.818 PREOP GENERAL PHYSICAL EXAM: Primary | ICD-10-CM

## 2024-10-29 DIAGNOSIS — Z41.1 ENCOUNTER FOR COSMETIC PROCEDURE: ICD-10-CM

## 2024-10-29 LAB — HGB BLD-MCNC: 11.6 G/DL (ref 11.7–15.7)

## 2024-10-29 PROCEDURE — 36415 COLL VENOUS BLD VENIPUNCTURE: CPT | Performed by: PHYSICIAN ASSISTANT

## 2024-10-29 PROCEDURE — 85018 HEMOGLOBIN: CPT | Performed by: PHYSICIAN ASSISTANT

## 2024-10-29 PROCEDURE — 99213 OFFICE O/P EST LOW 20 MIN: CPT | Performed by: PHYSICIAN ASSISTANT

## 2024-10-29 NOTE — PROGRESS NOTES
Preoperative Evaluation  [facility]  [address] [facility] [address]  Phone: [phone]  Fax: [phone]  Primary Provider: Dahlia Walton MD  Pre-op Performing Provider: Frandy Mejias PA-C  Oct 29, 2024         10/24/2024   Surgical Information   What procedure is being done? Brow/ face/neck lift    Facility or Hospital where procedure/surgery will be performed: [facility]    Who is doing the procedure / surgery? Aj Dailysoni    Date of surgery / procedure: 11/20/24    Time of surgery / procedure: 7:30am    Where do you plan to recover after surgery? at home with family        Patient-reported     Fax number for surgical facility: [phone]    Assessment & Plan     The proposed surgical procedure is considered LOW risk.    Preop general physical exam  Stable exam.  No concerns  - Hemoglobin; Future  - Hemoglobin    Encounter for cosmetic procedure              - No identified additional risk factors other than previously addressed    Preoperative Medication Instructions  Antiplatelet or Anticoagulation Medication Instructions   - Patient is on no antiplatelet or anticoagulation medications.    Additional Medication Instructions  Take all scheduled medications on the day of surgery EXCEPT for modifications listed below:  Hold ibuprofen 1 day prior to surgery.     Recommendation  Approval given to proceed with proposed procedure, without further diagnostic evaluation.    Gael Boothe is a 49 year old, presenting for the following:  Pre-Op Exam          10/29/2024     2:44 PM   Additional Questions   Roomed by Barbara GALAN CMA   Accompanied by Self     HPI related to upcoming procedure: History of cosmetic concerns and the above procedures were deemed the next best step in management.        10/24/2024   Pre-Op Questionnaire   Have you ever had a heart attack or stroke? No    Have you ever had surgery on your heart or blood vessels,  such as a stent placement, a coronary artery bypass, or surgery on an artery in your head, neck, heart, or legs? No    Do you have chest pain with activity? No    Do you have a history of heart failure? No    Do you currently have a cold, bronchitis or symptoms of other infection? No    Do you have a cough, shortness of breath, or wheezing? No    Do you or anyone in your family have previous history of blood clots? No    Do you or does anyone in your family have a serious bleeding problem such as prolonged bleeding following surgeries or cuts? No    Have you ever had problems with anemia or been told to take iron pills? (!) YES     Have you had any abnormal blood loss such as black, tarry or bloody stools, or abnormal vaginal bleeding? No    Have you ever had a blood transfusion? No    Are you willing to have a blood transfusion if it is medically needed before, during, or after your surgery? Yes    Have you or any of your relatives ever had problems with anesthesia? No    Do you have sleep apnea, excessive snoring or daytime drowsiness? No    Do you have any artifical heart valves or other implanted medical devices like a pacemaker, defibrillator, or continuous glucose monitor? No    Do you have artificial joints? No    Are you allergic to latex? No        Patient-reported     Health Care Directive  Patient does not have a Health Care Directive: Discussed advance care planning with patient; information given to patient to review.    Preoperative Review of    reviewed - no record of controlled substances prescribed.  In recent past.      Status of Chronic Conditions:  See problem list for active medical problems.  Problems all longstanding and stable, except as noted/documented.  See ROS for pertinent symptoms related to these conditions.    Past history of anxiety.  Stable on current Lexapro without side effects.  Denies any symptoms    Past history of migraines.  Follows closely with neurology also past  history of cerebral aneurysm also followed by neurology.  Overall stable no symptoms or concerns.    Does take he has been chronically.    Patient Active Problem List    Diagnosis Date Noted    Osteopenia 2024     Priority: Medium    Vitamin D deficiency 10/02/2020     Priority: Medium     Created by Conversion    Replacement Utility updated for latest IMO load      Insomnia 10/02/2020     Priority: Medium    Fatigue 10/02/2020     Priority: Medium     Created by Conversion      Chronic migraine 10/02/2020     Priority: Medium     Replacing diagnoses that were inactivated after the 10/1/2021 regulatory import.      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  It was most recently evaluated with an angiogram in May 2019 with the above measurements.  There is no contraindication to a repeat angiogram.  EKG and additional laboratory testing is not indicated given this procedure.  This procedure will be performed in a [facility] facility.  COVID testing has already been obtained but the result is pending at this time.  She was advised to discontinue nonsteroidal anti-inflammatories in anticipation for this procedure.      History of colonic polyps-Adenomatous 2018     Priority: Medium    Iron deficiency anemia 2015     Priority: Medium    Hypothyroidism 2005     Priority: Medium      Past Medical History:   Diagnosis Date    Anemia     Brain aneurysm     Chronic headaches     Colon polyps     Fatigue     Iron deficiency anemia     Ptosis of eyelid, bilateral     Vitamin D deficiency      Past Surgical History:   Procedure Laterality Date    ABDOMEN SURGERY          BLEPHAROPLASTY, BROW LIFT BILATERAL, COMBINED Bilateral 3/2/2021    Procedure: BILATERAL UPPER LID BLEPHAROPLASTY WITH RIGHT INTERNAL  PTOSIS  AND BILATERAL BROWPLASTY;  Surgeon: Chayo Peña MD;  Location: [facility] OR    C RECTUM SURGERY PROCEDURE UNLISTED  , 2007    4th degree laceration from , repeat procedure for repair    EXCISE ALLEN'S NEUROMA FOOT Left 2023    Procedure: EXCISION NEUROMA 3RD INTERMETATARSAL SPACE LEFT;  Surgeon: Elver Trimble DPM;  Location: [facility]    GI SURGERY      Pelvic Floor Surgery    HEAD & NECK SURGERY      Browns Teeth Extraction    IR CEREBRAL ANGIOGRAM  2019    IR CEREBRAL ANGIOGRAM  2020    IR CEREBRAL ANGIOGRAM  2019    IR CEREBRAL ANGIOGRAM  2020    ORTHOPEDIC SURGERY      rig 1st finger tendon repair, Excision Allen's Neuroma Left Foot    OTHER SURGICAL HISTORY Left     ALLEN'S NEUROMA    REPAIR TENDON FINGER(S)  1991    ZZ[facility]  DELIVERY ONLY      [facility] COLONOSCOPY W/WO BRUSH/WASH       abd pain; normal per pt     Current Outpatient Medications   Medication Sig Dispense Refill    cyclobenzaprine (FLEXERIL) 5 MG tablet TAKE 1 TABLET(5 MG) BY MOUTH THREE TIMES DAILY AS NEEDED FOR MUSCLE SPASMS 30 tablet 0    drospirenone-ethinyl estradiol (ADRIÁN 28) 3-0.03 MG tablet TAKE 1 TABLET BY MOUTH EVERY DAY. USE ACTIVE BIRTH CONTROL TABLET FOR 12 WEEKS AND OFF FOR 1 WEEK. 112 tablet 2    escitalopram (LEXAPRO) 10 MG tablet Take 1 tablet (10 mg) by mouth daily 90 tablet 3    ibuprofen (ADVIL/MOTRIN) 800 MG tablet Take 800 mg by mouth every 8 hours as needed for moderate pain      melatonin 3 MG tablet Take 1 mg by mouth nightly as needed for sleep      ubrogepant (UBRELVY) 100 MG tablet Take 1 tablet (100 mg) by mouth at onset of headache (headache) 16 tablet 3       Allergies   Allergen Reactions    Erythromycin Nausea and Vomiting and Other (See Comments)     Comment: Stomach Pain, Description:       Topiramate      Hair loss        Social History     Tobacco Use    Smoking status: Never     Passive exposure: Never    Smokeless tobacco: Never   Substance  "Use Topics    Alcohol use: Not Currently     Family History   Problem Relation Age of Onset    Migraines Mother     Aneurysm Mother     Melanoma Father     Other - See Comments Daughter         PANDAS    Hashimoto's thyroiditis Daughter     Kidney Cancer Maternal Grandmother 70    Colon Cancer Maternal Grandfather 84    Prostate Cancer Paternal Grandfather 80    Kidney Cancer Paternal Grandfather     Colon Cancer Paternal Grandfather 78    Other - See Comments Son         PANDAS    Gallbladder Disease Maternal Aunt 75    Aneurysm Maternal Uncle         Brain    Colon Cancer Paternal Aunt 50    Melanoma Paternal Aunt     Aneurysm Paternal Aunt         Brain    Melanoma Paternal Aunt 48    Breast Cancer No family hx of     Ovarian Cancer No family hx of      History   Drug Use Unknown             Review of Systems  Constitutional, HEENT, cardiovascular, pulmonary, GI, , musculoskeletal, neuro, skin, endocrine and psych systems are negative, except as otherwise noted.    Objective    BP 91/62   Pulse 102   Temp 99.2  F (37.3  C) (Oral)   Resp 16   Ht 1.6 m (5' 3\")   Wt 52.2 kg (115 lb 2 oz)   LMP  (LMP Unknown)   SpO2 99%   BMI 20.39 kg/m     Estimated body mass index is 20.39 kg/m  as calculated from the following:    Height as of this encounter: 1.6 m (5' 3\").    Weight as of this encounter: 52.2 kg (115 lb 2 oz).  Physical Exam  GENERAL: alert and no distress  EYES: Eyes grossly normal to inspection, PERRL and conjunctivae and sclerae normal  HENT: ear canals and TM's normal, nose and mouth without ulcers or lesions  NECK: no adenopathy, no asymmetry, masses, or scars  RESP: lungs clear to auscultation - no rales, rhonchi or wheezes  CV: regular rate and rhythm, normal S1 S2, no S3 or S4, no murmur, click or rub, no peripheral edema  ABDOMEN: soft, nontender, no hepatosplenomegaly, no masses and bowel sounds normal  MS: no gross musculoskeletal defects noted, no edema  SKIN: no suspicious lesions or " rashes  NEURO: Normal strength and tone, mentation intact and speech normal  PSYCH: mentation appears normal, affect normal/bright  LYMPH: no cervical adenopathy    Recent Labs   Lab Test 01/31/24  1231   HGB 13.2         POTASSIUM 4.3   CR 0.76        Diagnostics  Recent Results (from the past 24 hours)   Hemoglobin    Collection Time: 10/29/24  3:15 PM   Result Value Ref Range    Hemoglobin 11.6 (L) 11.7 - 15.7 g/dL      No EKG required for low risk surgery (cataract, skin procedure, breast biopsy, etc).    Revised Cardiac Risk Index (RCRI)  The patient has the following serious cardiovascular risks for perioperative complications:   - No serious cardiac risks = 0 points     RCRI Interpretation: 0 points: Class I (very low risk - 0.4% complication rate)         Signed Electronically by: Frandy Mejias PA-C  A copy of this evaluation report is provided to the requesting physician.

## 2024-11-01 ENCOUNTER — PATIENT OUTREACH (OUTPATIENT)
Dept: GASTROENTEROLOGY | Facility: CLINIC | Age: 49
End: 2024-11-01
Payer: COMMERCIAL

## 2024-11-12 NOTE — PROGRESS NOTES
PTA medications updated by Medication Scribe prior to surgery via phone call with patient (last doses completed by Nurse)     Medication history sources: Patient, Surescripts, and H&P  In the past week, patient estimated taking medication this percent of the time: Greater than 90%      Significant changes made to the medication list:  None      Additional medication history information:   None    Medication reconciliation completed by provider prior to medication history? No    Time spent in this activity: 25 minutes    The information provided in this note is only as accurate as the sources available at the time of update(s)      Prior to Admission medications    Medication Sig Last Dose Taking? Auth Provider Long Term End Date   Calcium Carbonate-Vit D-Min (CALTRATE PLUS OR) Take 1 tablet by mouth every evening. 11/10/2024 Evening Yes Reported, Patient     cyclobenzaprine (FLEXERIL) 5 MG tablet TAKE 1 TABLET(5 MG) BY MOUTH THREE TIMES DAILY AS NEEDED FOR MUSCLE SPASMS Evening Yes King Taylor MD     drospirenone-ethinyl estradiol (ADRIÁN 28) 3-0.03 MG tablet TAKE 1 TABLET BY MOUTH EVERY DAY. USE ACTIVE BIRTH CONTROL TABLET FOR 12 WEEKS AND OFF FOR 1 WEEK. Evening Yes Dahlia Walton MD Yes    escitalopram (LEXAPRO) 10 MG tablet Take 1 tablet (10 mg) by mouth daily Evening Yes Dahlia Walton MD Yes    ibuprofen (ADVIL/MOTRIN) 800 MG tablet Take 800 mg by mouth every 8 hours as needed for moderate pain 10/22/2024 Yes Reported, Patient     MAGNESIUM GLYCINATE PO Take 1 capsule by mouth every evening. 11/10/2024 Evening Yes Reported, Patient     melatonin 3 MG tablet Take 1 mg by mouth nightly as needed for sleep Evening Yes Reported, Patient     Multiple Vitamins-Minerals (MULTIVITAMIN ADULT, MINERALS,) TABS Take 1 tablet by mouth every evening. 11/10/2024 Evening Yes Reported, Patient     ubrogepant (UBRELVY) 100 MG tablet Take 1 tablet (100 mg) by mouth at onset of headache (headache) Unknown Yes  King Taylor MD         Medication history completed by: Machelle Rivera LPN

## 2024-11-13 ENCOUNTER — ANESTHESIA EVENT (OUTPATIENT)
Dept: SURGERY | Facility: CLINIC | Age: 49
End: 2024-11-13

## 2024-11-14 ENCOUNTER — HOSPITAL ENCOUNTER (OUTPATIENT)
Facility: CLINIC | Age: 49
Discharge: HOME | End: 2024-11-15
Attending: OTOLARYNGOLOGY | Admitting: OTOLARYNGOLOGY

## 2024-11-14 ENCOUNTER — ANESTHESIA (OUTPATIENT)
Dept: SURGERY | Facility: CLINIC | Age: 49
End: 2024-11-14

## 2024-11-14 PROBLEM — T88.59XA PROLONGED EMERGENCE FROM GENERAL ANESTHESIA, INITIAL ENCOUNTER: Status: ACTIVE | Noted: 2024-11-14

## 2024-11-14 LAB
CREAT SERPL-MCNC: 0.7 MG/DL (ref 0.51–0.95)
EGFRCR SERPLBLD CKD-EPI 2021: >90 ML/MIN/1.73M2
HCG UR QL: NEGATIVE

## 2024-11-14 PROCEDURE — 250N000011 HC RX IP 250 OP 636: Performed by: ANESTHESIOLOGY

## 2024-11-14 PROCEDURE — 250N000011 HC RX IP 250 OP 636: Performed by: OTOLARYNGOLOGY

## 2024-11-14 PROCEDURE — 360N000077 HC SURGERY LEVEL 4, PER MIN: Performed by: OTOLARYNGOLOGY

## 2024-11-14 PROCEDURE — 370N000017 HC ANESTHESIA TECHNICAL FEE, PER MIN: Performed by: OTOLARYNGOLOGY

## 2024-11-14 PROCEDURE — 250N000009 HC RX 250: Performed by: ANESTHESIOLOGY

## 2024-11-14 PROCEDURE — 81025 URINE PREGNANCY TEST: CPT | Performed by: ANESTHESIOLOGY

## 2024-11-14 PROCEDURE — 710N000009 HC RECOVERY PHASE 1, LEVEL 1, PER MIN: Performed by: OTOLARYNGOLOGY

## 2024-11-14 PROCEDURE — 82565 ASSAY OF CREATININE: CPT | Performed by: OTOLARYNGOLOGY

## 2024-11-14 PROCEDURE — 272N000002 HC OR SUPPLY OTHER OPNP: Performed by: OTOLARYNGOLOGY

## 2024-11-14 PROCEDURE — 250N000025 HC SEVOFLURANE, PER MIN: Performed by: OTOLARYNGOLOGY

## 2024-11-14 PROCEDURE — 258N000003 HC RX IP 258 OP 636: Performed by: NURSE ANESTHETIST, CERTIFIED REGISTERED

## 2024-11-14 PROCEDURE — 99207 PR NO BILLABLE SERVICE THIS VISIT: CPT | Performed by: NURSE PRACTITIONER

## 2024-11-14 PROCEDURE — 36415 COLL VENOUS BLD VENIPUNCTURE: CPT | Performed by: OTOLARYNGOLOGY

## 2024-11-14 PROCEDURE — 250N000009 HC RX 250: Performed by: OTOLARYNGOLOGY

## 2024-11-14 PROCEDURE — P9045 ALBUMIN (HUMAN), 5%, 250 ML: HCPCS | Performed by: NURSE ANESTHETIST, CERTIFIED REGISTERED

## 2024-11-14 PROCEDURE — 250N000009 HC RX 250: Performed by: NURSE ANESTHETIST, CERTIFIED REGISTERED

## 2024-11-14 PROCEDURE — 272N000001 HC OR GENERAL SUPPLY STERILE: Performed by: OTOLARYNGOLOGY

## 2024-11-14 PROCEDURE — 258N000001 HC RX 258: Performed by: OTOLARYNGOLOGY

## 2024-11-14 PROCEDURE — 999N000141 HC STATISTIC PRE-PROCEDURE NURSING ASSESSMENT: Performed by: OTOLARYNGOLOGY

## 2024-11-14 PROCEDURE — 250N000011 HC RX IP 250 OP 636: Performed by: NURSE ANESTHETIST, CERTIFIED REGISTERED

## 2024-11-14 RX ORDER — SODIUM CHLORIDE, SODIUM LACTATE, POTASSIUM CHLORIDE, CALCIUM CHLORIDE 600; 310; 30; 20 MG/100ML; MG/100ML; MG/100ML; MG/100ML
INJECTION, SOLUTION INTRAVENOUS CONTINUOUS
Status: DISCONTINUED | OUTPATIENT
Start: 2024-11-14 | End: 2024-11-14 | Stop reason: HOSPADM

## 2024-11-14 RX ORDER — CEFAZOLIN SODIUM/WATER 2 G/20 ML
2 SYRINGE (ML) INTRAVENOUS SEE ADMIN INSTRUCTIONS
Status: DISCONTINUED | OUTPATIENT
Start: 2024-11-14 | End: 2024-11-14 | Stop reason: HOSPADM

## 2024-11-14 RX ORDER — ONDANSETRON 2 MG/ML
4 INJECTION INTRAMUSCULAR; INTRAVENOUS EVERY 30 MIN PRN
Status: DISCONTINUED | OUTPATIENT
Start: 2024-11-14 | End: 2024-11-14 | Stop reason: HOSPADM

## 2024-11-14 RX ORDER — VALACYCLOVIR HYDROCHLORIDE 500 MG/1
500 TABLET, FILM COATED ORAL EVERY 12 HOURS SCHEDULED
Status: COMPLETED | OUTPATIENT
Start: 2024-11-15 | End: 2024-11-15

## 2024-11-14 RX ORDER — FENTANYL CITRATE 0.05 MG/ML
50 INJECTION, SOLUTION INTRAMUSCULAR; INTRAVENOUS EVERY 5 MIN PRN
Status: DISCONTINUED | OUTPATIENT
Start: 2024-11-14 | End: 2024-11-14 | Stop reason: HOSPADM

## 2024-11-14 RX ORDER — LIDOCAINE HYDROCHLORIDE 10 MG/ML
INJECTION, SOLUTION INFILTRATION; PERINEURAL
Status: DISCONTINUED
Start: 2024-11-14 | End: 2024-11-14 | Stop reason: HOSPADM

## 2024-11-14 RX ORDER — NALOXONE HYDROCHLORIDE 0.4 MG/ML
0.4 INJECTION, SOLUTION INTRAMUSCULAR; INTRAVENOUS; SUBCUTANEOUS
Status: DISCONTINUED | OUTPATIENT
Start: 2024-11-14 | End: 2024-11-15 | Stop reason: HOSPADM

## 2024-11-14 RX ORDER — SODIUM CHLORIDE, SODIUM LACTATE, POTASSIUM CHLORIDE, CALCIUM CHLORIDE 600; 310; 30; 20 MG/100ML; MG/100ML; MG/100ML; MG/100ML
INJECTION, SOLUTION INTRAVENOUS CONTINUOUS PRN
Status: DISCONTINUED | OUTPATIENT
Start: 2024-11-14 | End: 2024-11-14

## 2024-11-14 RX ORDER — FENTANYL CITRATE 0.05 MG/ML
25 INJECTION, SOLUTION INTRAMUSCULAR; INTRAVENOUS EVERY 5 MIN PRN
Status: DISCONTINUED | OUTPATIENT
Start: 2024-11-14 | End: 2024-11-14 | Stop reason: HOSPADM

## 2024-11-14 RX ORDER — NALOXONE HYDROCHLORIDE 0.4 MG/ML
0.2 INJECTION, SOLUTION INTRAMUSCULAR; INTRAVENOUS; SUBCUTANEOUS
Status: DISCONTINUED | OUTPATIENT
Start: 2024-11-14 | End: 2024-11-15 | Stop reason: HOSPADM

## 2024-11-14 RX ORDER — VALACYCLOVIR HYDROCHLORIDE 500 MG/1
1 TABLET, FILM COATED ORAL
COMMUNITY
Start: 2024-11-11

## 2024-11-14 RX ORDER — SODIUM CHLORIDE 9 MG/ML
INJECTION, SOLUTION INTRAVENOUS CONTINUOUS PRN
Status: DISCONTINUED | OUTPATIENT
Start: 2024-11-14 | End: 2024-11-14

## 2024-11-14 RX ORDER — HYDROMORPHONE HCL IN WATER/PF 6 MG/30 ML
0.2 PATIENT CONTROLLED ANALGESIA SYRINGE INTRAVENOUS EVERY 5 MIN PRN
Status: DISCONTINUED | OUTPATIENT
Start: 2024-11-14 | End: 2024-11-14 | Stop reason: HOSPADM

## 2024-11-14 RX ORDER — KETAMINE HYDROCHLORIDE 10 MG/ML
INJECTION INTRAMUSCULAR; INTRAVENOUS PRN
Status: DISCONTINUED | OUTPATIENT
Start: 2024-11-14 | End: 2024-11-14

## 2024-11-14 RX ORDER — HYDROMORPHONE HCL IN WATER/PF 6 MG/30 ML
0.4 PATIENT CONTROLLED ANALGESIA SYRINGE INTRAVENOUS
Status: DISCONTINUED | OUTPATIENT
Start: 2024-11-14 | End: 2024-11-15

## 2024-11-14 RX ORDER — PROPARACAINE HYDROCHLORIDE 5 MG/ML
2 SOLUTION/ DROPS OPHTHALMIC ONCE
Status: DISCONTINUED | OUTPATIENT
Start: 2024-11-14 | End: 2024-11-14

## 2024-11-14 RX ORDER — FENTANYL CITRATE 50 UG/ML
INJECTION, SOLUTION INTRAMUSCULAR; INTRAVENOUS PRN
Status: DISCONTINUED | OUTPATIENT
Start: 2024-11-14 | End: 2024-11-14

## 2024-11-14 RX ORDER — HYDROMORPHONE HCL IN WATER/PF 6 MG/30 ML
0.4 PATIENT CONTROLLED ANALGESIA SYRINGE INTRAVENOUS EVERY 5 MIN PRN
Status: DISCONTINUED | OUTPATIENT
Start: 2024-11-14 | End: 2024-11-14 | Stop reason: HOSPADM

## 2024-11-14 RX ORDER — DEXMEDETOMIDINE HYDROCHLORIDE 4 UG/ML
INJECTION, SOLUTION INTRAVENOUS PRN
Status: DISCONTINUED | OUTPATIENT
Start: 2024-11-14 | End: 2024-11-14

## 2024-11-14 RX ORDER — ONDANSETRON 2 MG/ML
4 INJECTION INTRAMUSCULAR; INTRAVENOUS EVERY 6 HOURS PRN
Status: DISCONTINUED | OUTPATIENT
Start: 2024-11-14 | End: 2024-11-15 | Stop reason: HOSPADM

## 2024-11-14 RX ORDER — APREPITANT 40 MG/1
40 CAPSULE ORAL ONCE
Status: COMPLETED | OUTPATIENT
Start: 2024-11-14 | End: 2024-11-14

## 2024-11-14 RX ORDER — ONDANSETRON 2 MG/ML
INJECTION INTRAMUSCULAR; INTRAVENOUS PRN
Status: DISCONTINUED | OUTPATIENT
Start: 2024-11-14 | End: 2024-11-14

## 2024-11-14 RX ORDER — HYDROMORPHONE HCL IN WATER/PF 6 MG/30 ML
0.2 PATIENT CONTROLLED ANALGESIA SYRINGE INTRAVENOUS
Status: DISCONTINUED | OUTPATIENT
Start: 2024-11-14 | End: 2024-11-15

## 2024-11-14 RX ORDER — CEFAZOLIN SODIUM/WATER 2 G/20 ML
2 SYRINGE (ML) INTRAVENOUS
Status: DISCONTINUED | OUTPATIENT
Start: 2024-11-14 | End: 2024-11-14 | Stop reason: HOSPADM

## 2024-11-14 RX ORDER — CARBOXYMETHYLCELLULOSE SODIUM 5 MG/ML
2 SOLUTION/ DROPS OPHTHALMIC
Status: DISCONTINUED | OUTPATIENT
Start: 2024-11-14 | End: 2024-11-15 | Stop reason: HOSPADM

## 2024-11-14 RX ORDER — LIDOCAINE HYDROCHLORIDE 20 MG/ML
INJECTION, SOLUTION INFILTRATION; PERINEURAL PRN
Status: DISCONTINUED | OUTPATIENT
Start: 2024-11-14 | End: 2024-11-14

## 2024-11-14 RX ORDER — DEXAMETHASONE SODIUM PHOSPHATE 4 MG/ML
INJECTION, SOLUTION INTRA-ARTICULAR; INTRALESIONAL; INTRAMUSCULAR; INTRAVENOUS; SOFT TISSUE PRN
Status: DISCONTINUED | OUTPATIENT
Start: 2024-11-14 | End: 2024-11-14

## 2024-11-14 RX ORDER — PROPOFOL 10 MG/ML
INJECTION, EMULSION INTRAVENOUS PRN
Status: DISCONTINUED | OUTPATIENT
Start: 2024-11-14 | End: 2024-11-14

## 2024-11-14 RX ORDER — CEFAZOLIN SODIUM 1 G/3ML
1 INJECTION, POWDER, FOR SOLUTION INTRAMUSCULAR; INTRAVENOUS EVERY 8 HOURS
Status: COMPLETED | OUTPATIENT
Start: 2024-11-15 | End: 2024-11-15

## 2024-11-14 RX ORDER — ONDANSETRON 4 MG/1
4 TABLET, ORALLY DISINTEGRATING ORAL EVERY 6 HOURS PRN
Status: DISCONTINUED | OUTPATIENT
Start: 2024-11-14 | End: 2024-11-15 | Stop reason: HOSPADM

## 2024-11-14 RX ORDER — GINSENG 100 MG
CAPSULE ORAL PRN
Status: DISCONTINUED | OUTPATIENT
Start: 2024-11-14 | End: 2024-11-14 | Stop reason: HOSPADM

## 2024-11-14 RX ORDER — PROPOFOL 10 MG/ML
INJECTION, EMULSION INTRAVENOUS CONTINUOUS PRN
Status: DISCONTINUED | OUTPATIENT
Start: 2024-11-14 | End: 2024-11-14

## 2024-11-14 RX ORDER — OXYCODONE HYDROCHLORIDE 5 MG/1
5 TABLET ORAL EVERY 4 HOURS PRN
Status: DISCONTINUED | OUTPATIENT
Start: 2024-11-14 | End: 2024-11-15

## 2024-11-14 RX ORDER — OXYCODONE HYDROCHLORIDE 5 MG/1
10 TABLET ORAL EVERY 4 HOURS PRN
Status: DISCONTINUED | OUTPATIENT
Start: 2024-11-14 | End: 2024-11-15

## 2024-11-14 RX ORDER — LIDOCAINE 40 MG/G
CREAM TOPICAL
Status: DISCONTINUED | OUTPATIENT
Start: 2024-11-14 | End: 2024-11-15

## 2024-11-14 RX ORDER — LIDOCAINE HYDROCHLORIDE 10 MG/ML
INJECTION, SOLUTION EPIDURAL; INFILTRATION; INTRACAUDAL; PERINEURAL
Status: DISCONTINUED
Start: 2024-11-14 | End: 2024-11-14 | Stop reason: HOSPADM

## 2024-11-14 RX ORDER — DEXAMETHASONE SODIUM PHOSPHATE 4 MG/ML
4 INJECTION, SOLUTION INTRA-ARTICULAR; INTRALESIONAL; INTRAMUSCULAR; INTRAVENOUS; SOFT TISSUE
Status: DISCONTINUED | OUTPATIENT
Start: 2024-11-14 | End: 2024-11-14 | Stop reason: HOSPADM

## 2024-11-14 RX ORDER — NALOXONE HYDROCHLORIDE 0.4 MG/ML
0.1 INJECTION, SOLUTION INTRAMUSCULAR; INTRAVENOUS; SUBCUTANEOUS
Status: DISCONTINUED | OUTPATIENT
Start: 2024-11-14 | End: 2024-11-14 | Stop reason: HOSPADM

## 2024-11-14 RX ORDER — ONDANSETRON 4 MG/1
4 TABLET, ORALLY DISINTEGRATING ORAL EVERY 30 MIN PRN
Status: DISCONTINUED | OUTPATIENT
Start: 2024-11-14 | End: 2024-11-14 | Stop reason: HOSPADM

## 2024-11-14 RX ORDER — MAGNESIUM HYDROXIDE 1200 MG/15ML
LIQUID ORAL PRN
Status: DISCONTINUED | OUTPATIENT
Start: 2024-11-14 | End: 2024-11-14 | Stop reason: HOSPADM

## 2024-11-14 RX ADMIN — ALBUMIN (HUMAN): 12.5 SOLUTION INTRAVENOUS at 10:17

## 2024-11-14 RX ADMIN — APREPITANT 40 MG: 40 CAPSULE ORAL at 07:25

## 2024-11-14 RX ADMIN — ONDANSETRON 4 MG: 2 INJECTION INTRAMUSCULAR; INTRAVENOUS at 18:56

## 2024-11-14 RX ADMIN — SODIUM CHLORIDE: 9 INJECTION, SOLUTION INTRAVENOUS at 07:54

## 2024-11-14 RX ADMIN — HYDROMORPHONE HYDROCHLORIDE 0.4 MG: 0.2 INJECTION, SOLUTION INTRAMUSCULAR; INTRAVENOUS; SUBCUTANEOUS at 23:57

## 2024-11-14 RX ADMIN — MIDAZOLAM 2 MG: 1 INJECTION INTRAMUSCULAR; INTRAVENOUS at 07:48

## 2024-11-14 RX ADMIN — ROCURONIUM BROMIDE 20 MG: 50 INJECTION, SOLUTION INTRAVENOUS at 16:45

## 2024-11-14 RX ADMIN — MINERAL OIL AND PETROLATUM 1 G: 150; 830 OINTMENT OPHTHALMIC at 09:27

## 2024-11-14 RX ADMIN — Medication 200 MG: at 19:06

## 2024-11-14 RX ADMIN — PHENYLEPHRINE HYDROCHLORIDE 0.5 MCG/KG/MIN: 10 INJECTION INTRAVENOUS at 13:57

## 2024-11-14 RX ADMIN — PHENYLEPHRINE HYDROCHLORIDE 100 MCG: 10 INJECTION INTRAVENOUS at 18:47

## 2024-11-14 RX ADMIN — PHENYLEPHRINE HYDROCHLORIDE 100 MCG: 10 INJECTION INTRAVENOUS at 11:58

## 2024-11-14 RX ADMIN — ROCURONIUM BROMIDE 10 MG: 50 INJECTION, SOLUTION INTRAVENOUS at 10:45

## 2024-11-14 RX ADMIN — Medication 2 G: at 12:09

## 2024-11-14 RX ADMIN — HYDROMORPHONE HYDROCHLORIDE 0.5 MG: 1 INJECTION, SOLUTION INTRAMUSCULAR; INTRAVENOUS; SUBCUTANEOUS at 19:16

## 2024-11-14 RX ADMIN — LIDOCAINE HYDROCHLORIDE 80 MG: 20 INJECTION, SOLUTION INFILTRATION; PERINEURAL at 07:54

## 2024-11-14 RX ADMIN — HYDROMORPHONE HYDROCHLORIDE 0.5 MG: 1 INJECTION, SOLUTION INTRAMUSCULAR; INTRAVENOUS; SUBCUTANEOUS at 12:38

## 2024-11-14 RX ADMIN — ROCURONIUM BROMIDE 10 MG: 50 INJECTION, SOLUTION INTRAVENOUS at 15:12

## 2024-11-14 RX ADMIN — PHENYLEPHRINE HYDROCHLORIDE 100 MCG: 10 INJECTION INTRAVENOUS at 08:36

## 2024-11-14 RX ADMIN — Medication 30 MG: at 08:00

## 2024-11-14 RX ADMIN — FENTANYL CITRATE 100 MCG: 50 INJECTION INTRAMUSCULAR; INTRAVENOUS at 07:54

## 2024-11-14 RX ADMIN — PROPOFOL 50 MCG/KG/MIN: 10 INJECTION, EMULSION INTRAVENOUS at 08:00

## 2024-11-14 RX ADMIN — PROPOFOL 200 MG: 10 INJECTION, EMULSION INTRAVENOUS at 07:54

## 2024-11-14 RX ADMIN — ROCURONIUM BROMIDE 10 MG: 50 INJECTION, SOLUTION INTRAVENOUS at 11:32

## 2024-11-14 RX ADMIN — FENTANYL CITRATE 50 MCG: 50 INJECTION, SOLUTION INTRAMUSCULAR; INTRAVENOUS at 19:50

## 2024-11-14 RX ADMIN — MINERAL OIL AND PETROLATUM 1 G: 150; 830 OINTMENT OPHTHALMIC at 08:09

## 2024-11-14 RX ADMIN — Medication 2 G: at 16:09

## 2024-11-14 RX ADMIN — Medication 2 G: at 08:09

## 2024-11-14 RX ADMIN — ROCURONIUM BROMIDE 20 MG: 50 INJECTION, SOLUTION INTRAVENOUS at 09:41

## 2024-11-14 RX ADMIN — DEXAMETHASONE SODIUM PHOSPHATE 4 MG: 4 INJECTION, SOLUTION INTRA-ARTICULAR; INTRALESIONAL; INTRAMUSCULAR; INTRAVENOUS; SOFT TISSUE at 08:19

## 2024-11-14 RX ADMIN — ROCURONIUM BROMIDE 20 MG: 50 INJECTION, SOLUTION INTRAVENOUS at 15:55

## 2024-11-14 RX ADMIN — HYDROMORPHONE HYDROCHLORIDE 0.2 MG: 0.2 INJECTION, SOLUTION INTRAMUSCULAR; INTRAVENOUS; SUBCUTANEOUS at 21:35

## 2024-11-14 RX ADMIN — Medication 10 MG: at 10:00

## 2024-11-14 RX ADMIN — PHENYLEPHRINE HYDROCHLORIDE 100 MCG: 10 INJECTION INTRAVENOUS at 10:34

## 2024-11-14 RX ADMIN — ROCURONIUM BROMIDE 10 MG: 50 INJECTION, SOLUTION INTRAVENOUS at 08:49

## 2024-11-14 RX ADMIN — ROCURONIUM BROMIDE 50 MG: 50 INJECTION, SOLUTION INTRAVENOUS at 07:54

## 2024-11-14 RX ADMIN — PHENYLEPHRINE HYDROCHLORIDE 100 MCG: 10 INJECTION INTRAVENOUS at 11:04

## 2024-11-14 RX ADMIN — DEXMEDETOMIDINE HYDROCHLORIDE 12 MCG: 200 INJECTION INTRAVENOUS at 18:01

## 2024-11-14 RX ADMIN — Medication 10 MG: at 12:58

## 2024-11-14 RX ADMIN — ROCURONIUM BROMIDE 20 MG: 50 INJECTION, SOLUTION INTRAVENOUS at 13:05

## 2024-11-14 RX ADMIN — PHENYLEPHRINE HYDROCHLORIDE 0.2 MCG/KG/MIN: 10 INJECTION INTRAVENOUS at 08:22

## 2024-11-14 RX ADMIN — SODIUM CHLORIDE, POTASSIUM CHLORIDE, SODIUM LACTATE AND CALCIUM CHLORIDE: 600; 310; 30; 20 INJECTION, SOLUTION INTRAVENOUS at 10:41

## 2024-11-14 RX ADMIN — ROCURONIUM BROMIDE 10 MG: 50 INJECTION, SOLUTION INTRAVENOUS at 12:23

## 2024-11-14 RX ADMIN — PHENYLEPHRINE HYDROCHLORIDE 100 MCG: 10 INJECTION INTRAVENOUS at 09:00

## 2024-11-14 RX ADMIN — MINERAL OIL AND PETROLATUM 1 G: 150; 830 OINTMENT OPHTHALMIC at 09:43

## 2024-11-14 RX ADMIN — ALBUMIN (HUMAN): 12.5 SOLUTION INTRAVENOUS at 13:41

## 2024-11-14 RX ADMIN — ROCURONIUM BROMIDE 10 MG: 50 INJECTION, SOLUTION INTRAVENOUS at 14:37

## 2024-11-14 ASSESSMENT — ACTIVITIES OF DAILY LIVING (ADL)
ADLS_ACUITY_SCORE: 0

## 2024-11-14 ASSESSMENT — COPD QUESTIONNAIRES: COPD: 0

## 2024-11-14 NOTE — ANESTHESIA PROCEDURE NOTES
Airway         Procedure Start/Stop Times: 11/14/2024 7:57 AM  Staff -        Anesthesiologist:  Yasmeen Khanna       CRNA: Deepthi Marin APRN CRNA       Performed By: CRNAIndications and Patient Condition       Indications for airway management: matt-procedural       Induction type:intravenous       Mask difficulty assessment: 1 - vent by mask    Final Airway Details       Final airway type: endotracheal airway       Successful airway: ETT - single and Laser  Endotracheal Airway Details        ETT size (mm): 6.0       Cuffed: yes       Successful intubation technique: video laryngoscopy       VL Blade Size: Almanza 3       Grade View of Cords: 1       Adjucts: stylet       Position: Right       Measured from: gums/teeth       Secured at (cm): 20       Bite block used: None    Post intubation assessment        Placement verified by: capnometry, equal breath sounds and chest rise        Number of attempts at approach: 1       Number of other approaches attempted: 0       Secured with: sutures       Ease of procedure: easy       Dentition: Intact and Unchanged    Medication(s) Administered   Medication Administration Time: 11/14/2024 7:57 AM    Additional Comments       5 ML placed in both balloons of laser ETT cuff.

## 2024-11-14 NOTE — ANESTHESIA PREPROCEDURE EVALUATION
Anesthesia Pre-Procedure Evaluation    Patient: Shyann Jurado   MRN: [id number] : 1975        Procedure : Procedure(s):  COSMETIC BILATERAL ENDOSCOPIC BROWLIFT,  BILATERAL FACELIFT, BILATERAL NECKLIFT  FAT TRANSFER FROM TRUNK TO FACE  AND FRAXEL CARBON DIOXIDE LASERTO THE FACE          Past Medical History:   Diagnosis Date    Anemia     Brain aneurysm     Chronic headaches     Colon polyps     Fatigue     Iron deficiency anemia     Ptosis of eyelid, bilateral     Vitamin D deficiency       Past Surgical History:   Procedure Laterality Date    ABDOMEN SURGERY          BLEPHAROPLASTY, BROW LIFT BILATERAL, COMBINED Bilateral 3/2/2021    Procedure: BILATERAL UPPER LID BLEPHAROPLASTY WITH RIGHT INTERNAL PTOSIS  AND BILATERAL BROWPLASTY;  Surgeon: Chayo Peña MD;  Location: [facility]    C RECTUM SURGERY PROCEDURE UNLISTED  , 2007    4th degree laceration from , repeat procedure for repair    EXCISE ALLEN'S NEUROMA FOOT Left 2023    Procedure: EXCISION NEUROMA 3RD INTERMETATARSAL SPACE LEFT;  Surgeon: Elver Trimble DPM;  Location: [facility]    GI SURGERY      Pelvic Floor Surgery    HEAD & NECK SURGERY      [place] Teeth Extraction    IR CEREBRAL ANGIOGRAM  2019    IR CEREBRAL ANGIOGRAM  2020    IR CEREBRAL ANGIOGRAM  2019    IR CEREBRAL ANGIOGRAM  2020    ORTHOPEDIC SURGERY      righ 1st finger tendon repair, Excision Allen's Neuroma Left Foot    OTHER SURGICAL HISTORY Left     ALLEN'S NEUROMA    REPAIR TENDON FINGER(S)  1991    ZZC  DELIVERY ONLY      ZZHC COLONOSCOPY W/WO BRUSH/WASH       abd pain; normal per pt      Allergies   Allergen Reactions    Erythromycin Nausea and Vomiting and Other (See Comments)     Comment: Stomach Pain, Description:       Topiramate      Hair loss      Social History     Tobacco Use    Smoking status: Never     Passive exposure: Never    Smokeless tobacco: Never   Substance Use Topics    Alcohol use:  "Not Currently      Wt Readings from Last 1 Encounters:   11/14/24 51.8 kg (114 lb 1.6 oz)        Anesthesia Evaluation            ROS/MED HX  ENT/Pulmonary:    (-) asthma, COPD and sleep apnea   Neurologic: Comment: Brain aneurysm    (+)      migraines,                          Cardiovascular:  - neg cardiovascular ROS     METS/Exercise Tolerance:     Hematologic:     (+)      anemia,          Musculoskeletal:       GI/Hepatic:    (-) GERD and liver disease   Renal/Genitourinary:    (-) renal disease   Endo:     (+)          thyroid problem, hypothyroidism,           Psychiatric/Substance Use:     (+) psychiatric history anxiety       Infectious Disease:       Malignancy:       Other:            Physical Exam    Airway        Mallampati: I   TM distance: > 3 FB   Neck ROM: full     Respiratory Devices and Support         Dental       (+) Minor Abnormalities - some fillings, tiny chips      Cardiovascular   cardiovascular exam normal          Pulmonary   pulmonary exam normal                OUTSIDE LABS:  CBC:   Lab Results   Component Value Date    WBC 8.1 01/31/2024    WBC 8.6 09/07/2022    HGB 11.6 (L) 10/29/2024    HGB 13.2 01/31/2024    HCT 40.5 01/31/2024    HCT 36.7 09/07/2022     01/31/2024     09/07/2022     BMP:   Lab Results   Component Value Date     01/31/2024     09/07/2022    POTASSIUM 4.3 01/31/2024    POTASSIUM 4.7 09/07/2022    CHLORIDE 103 01/31/2024    CHLORIDE 104 09/07/2022    CO2 22 01/31/2024    CO2 25 09/07/2022    BUN 11.4 01/31/2024    BUN 10.8 09/07/2022    CR 0.76 01/31/2024    CR 0.69 09/07/2022    GLC 78 01/31/2024    GLC 98 09/07/2022     COAGS: No results found for: \"PTT\", \"INR\", \"FIBR\"  POC:   Lab Results   Component Value Date    HCG Negative 11/14/2024     HEPATIC:   Lab Results   Component Value Date    ALBUMIN 4.6 01/31/2024    PROTTOTAL 7.9 01/31/2024    ALT 13 01/31/2024    AST 18 01/31/2024    ALKPHOS 68 01/31/2024    BILITOTAL 0.6 01/31/2024 "     OTHER:   Lab Results   Component Value Date    DEMETRIUS 9.7 01/31/2024    TSH 3.81 01/31/2024    SED 14 01/31/2024       Anesthesia Plan    ASA Status:  2       Anesthesia Type: General.     - Airway: ETT              Consents    Anesthesia Plan(s) and associated risks, benefits, and realistic alternatives discussed. Questions answered and patient/representative(s) expressed understanding.     - Discussed:     - Discussed with:  Patient            Postoperative Care       PONV prophylaxis: Ondansetron (or other 5HT-3), Dexamethasone or Solumedrol, Background Propofol Infusion, Aprepitant     Comments:               Yasmeen Khanna I have reviewed the pertinent notes and labs in the chart from the past 30 days and (re)examined the patient.  Any updates or changes from those notes are reflected in this note.

## 2024-11-15 VITALS
RESPIRATION RATE: 16 BRPM | DIASTOLIC BLOOD PRESSURE: 65 MMHG | TEMPERATURE: 98.4 F | HEART RATE: 86 BPM | OXYGEN SATURATION: 95 % | WEIGHT: 114.1 LBS | HEIGHT: 63 IN | BODY MASS INDEX: 20.21 KG/M2 | SYSTOLIC BLOOD PRESSURE: 103 MMHG

## 2024-11-15 PROCEDURE — 250N000011 HC RX IP 250 OP 636: Performed by: OTOLARYNGOLOGY

## 2024-11-15 PROCEDURE — 250N000013 HC RX MED GY IP 250 OP 250 PS 637: Performed by: OTOLARYNGOLOGY

## 2024-11-15 RX ORDER — CYCLOBENZAPRINE HCL 5 MG
5 TABLET ORAL 3 TIMES DAILY PRN
Status: DISCONTINUED | OUTPATIENT
Start: 2024-11-15 | End: 2024-11-15 | Stop reason: HOSPADM

## 2024-11-15 RX ORDER — VALACYCLOVIR HYDROCHLORIDE 500 MG/1
500 TABLET, FILM COATED ORAL
Status: DISCONTINUED | OUTPATIENT
Start: 2024-11-15 | End: 2024-11-15 | Stop reason: HOSPADM

## 2024-11-15 RX ORDER — ACETAMINOPHEN AND CODEINE PHOSPHATE 300; 30 MG/1; MG/1
1 TABLET ORAL ONCE
Status: COMPLETED | OUTPATIENT
Start: 2024-11-15 | End: 2024-11-15

## 2024-11-15 RX ORDER — HYDROMORPHONE HYDROCHLORIDE 2 MG/1
2 TABLET ORAL
Status: DISCONTINUED | OUTPATIENT
Start: 2024-11-15 | End: 2024-11-15

## 2024-11-15 RX ADMIN — ACETAMINOPHEN AND CODEINE PHOSPHATE 1 TABLET: 300; 30 TABLET ORAL at 16:04

## 2024-11-15 RX ADMIN — HYDROMORPHONE HYDROCHLORIDE 0.4 MG: 0.2 INJECTION, SOLUTION INTRAMUSCULAR; INTRAVENOUS; SUBCUTANEOUS at 04:27

## 2024-11-15 RX ADMIN — HYDROMORPHONE HYDROCHLORIDE 0.4 MG: 0.2 INJECTION, SOLUTION INTRAMUSCULAR; INTRAVENOUS; SUBCUTANEOUS at 06:40

## 2024-11-15 RX ADMIN — HYDROMORPHONE HYDROCHLORIDE 2 MG: 2 TABLET ORAL at 12:48

## 2024-11-15 RX ADMIN — CEFAZOLIN 1 G: 1 INJECTION, POWDER, FOR SOLUTION INTRAMUSCULAR; INTRAVENOUS at 16:07

## 2024-11-15 RX ADMIN — HYDROMORPHONE HYDROCHLORIDE 0.4 MG: 0.2 INJECTION, SOLUTION INTRAMUSCULAR; INTRAVENOUS; SUBCUTANEOUS at 02:18

## 2024-11-15 RX ADMIN — VALACYCLOVIR HYDROCHLORIDE 500 MG: 500 TABLET, FILM COATED ORAL at 07:53

## 2024-11-15 RX ADMIN — CEFAZOLIN 1 G: 1 INJECTION, POWDER, FOR SOLUTION INTRAMUSCULAR; INTRAVENOUS at 07:53

## 2024-11-15 RX ADMIN — CEFAZOLIN 1 G: 1 INJECTION, POWDER, FOR SOLUTION INTRAMUSCULAR; INTRAVENOUS at 00:02

## 2024-11-15 RX ADMIN — OXYCODONE HYDROCHLORIDE 5 MG: 5 TABLET ORAL at 09:03

## 2024-11-15 ASSESSMENT — ACTIVITIES OF DAILY LIVING (ADL)
ADLS_ACUITY_SCORE: 0

## 2024-11-15 NOTE — PROGRESS NOTES
[facility] brief note:    While present on unit, nursing notes pt reporting foreign object sensation in her eye(s) postoperatively.  Ordered ophthalmic drops to facilitate wood's lamp eye exam; however, prior to entering pt's room, nursing notes pt reports no further eye pain, pt reported due to actual foreign object present.  At this time, will hold on eye exam.  Please contact [facility] if eye pain reoccurs.  Will leave PRN artificial tears available.    MARINA Monte, CNP  [facility]    No charge.

## 2024-11-15 NOTE — BRIEF OP NOTE
[facility]    Brief Operative Note    Pre-operative diagnosis: Facial aging [R23.8]  Post-operative diagnosis Same as pre-operative diagnosis    Procedure: COSMETIC BILATERAL ENDOSCOPIC BROWLIFT,, Bilateral - Face  BILATERAL FACELIFT, BILATERAL NECKLIFT, Bilateral - Face  FAT TRANSFER FROM TRUNK TO FACE, N/A - Update  AND FRAXEL CARBON DIOXIDE LASER TO THE FACE, N/A - Face    Surgeon: Surgeons and Role:  Panel 1:     * Aj Smallwood MD - Primary  Panel 2:     * Aj Smallwood MD - Primary  Anesthesia: General   Estimated Blood Loss: Less than 50 ml    Drains: 2- 10 Colombian round drains in post-auricular regions  Specimens: Skin and fat (not sent)  Findings:   None.  Complications: None.  Implants: 2- 3.5 mm Endotine Forehead fixation devices    Patient added Fraxel Carbon Dioxide laser to arms and hands to today's procedures

## 2024-11-15 NOTE — ANESTHESIA POSTPROCEDURE EVALUATION
Patient: Shyann Jurado    Procedure: Procedure(s):  COSMETIC BILATERAL ENDOSCOPIC BROWLIFT,  BILATERAL FACELIFT, BILATERAL NECKLIFT  FAT TRANSFER FROM TRUNK TO FACE  AND FRAXEL CARBON DIOXIDE LASER TO THE FACE       Anesthesia Type:  General    Note:  Disposition: Inpatient   Postop Pain Control: Uneventful            Sign Out: Well controlled pain   PONV: No   Neuro/Psych: Uneventful            Sign Out: Acceptable/Baseline neuro status   Airway/Respiratory: Uneventful            Sign Out: Acceptable/Baseline resp. status   CV/Hemodynamics: Uneventful            Sign Out: Acceptable CV status; No obvious hypovolemia; No obvious fluid overload   Other NRE: NONE   DID A NON-ROUTINE EVENT OCCUR? No           Last vitals:  Vitals Value Taken Time   /73 11/14/24 2015   Temp 36.1  C (97  F) 11/14/24 2015   Pulse 107 11/14/24 2023   Resp 13 11/14/24 2023   SpO2 96 % 11/14/24 2001   Vitals shown include unfiled device data.    Electronically Signed By: Praveen Mckinney DO  November 14, 2024  8:33 PM

## 2024-11-15 NOTE — PLAN OF CARE
Date & Time: 6398-1758   Surgery/POD#: POD 1 of a cosmetic bilateral endoscopic browlift, bilateral facelift and neck lift, fat transfer from trunk to face, and fraxel carbon  dioxide laser to the face  Behavior & Aggression: Green  Fall Risk: Yes  Orientation: A&O x 4  ABNL VS/O2: VSS on RA  Pain Management: PRN PO oxy x1, PO dilaudid x1  Bowel/Bladder: Due to void, bladder scan of 357   Drains: PIV SL  Wounds/incisions: Bilateral pelvic puncture sites, umbilicus lap sites, facial incisions- OSIRIS  Diet: Regular  Activity Level: A1 w/ GB   Tests/Procedures: N/A  Anticipated  DC Date: Today  Significant Information: Lopez catheter pulled today, PO Oxy and IV dilaudid Dc'd, if any questions call Dr. Smallwood @ [phone]

## 2024-11-15 NOTE — PROGRESS NOTES
Patient VSS are at baseline: MET     Patient able to ambulate as they were prior to admission or with assist devices provided by therapies during their stay: NOT MET - only stood at bedside overnight    Patient able to tolerate oral intake and maintain hydration status: NOT MET - tolerated full liquid diet overnight, will advance to regular for breakfast    Patient has adequate pain control using oral analgesics: NOT MET - pt declined oral opioids, IV dilaudid x5    Patient must be voiding adequate amounts: NOT MET - edwards catheter still in place    Hypercapnia, Hypoventilation or hypoxia resolved for at least 2 hours without supplemental oxygen: MET    Deficits in sensation, mobility or coordination have resolved if spinal or regional anesthesia used: MET    Patient has returned to baseline mental status: MET     Shift: arrived from PACU @ 9p - 7a  Surgery/POD#: POD 1 from a COSMETIC BILATERAL ENDOSCOPIC BROWLIFT, BILATERAL FACELIFT, BILATERAL NECKLIFT, FAT TRANSFER FROM TRUNK TO FACE, AND FRAXEL CARBON DIOXIDE LASER TO THE FACE  Orientation: A&O x4  ABNL VS/O2: VSS on RA   Pain Management: IV dilaudid x5, pt refused oxy  Bowel/Bladder: edwards catheter overnight, bowel sounds hypoactive  Drains: MORGAN drains behind bilat ears  Lines: PIV SL'd, int Abx  Skin: bilat pelvis puncure sites, umbilicus lap site, facial incisions/dressing  Diet: full liquid, advanced diet to regular for breakfast  Activity Level: x1 gb, pt stood at bedside only  Tests/Procedures: NA  Anticipated  DC Date: 11/15?  Significant Information:   ENT following. PCDs on overnight. IS education completed and at the bedside.   Edwards catheter to be removed later this AM - not removed yet due to mobility/patient request, educated on the importance of removing it earlier than later.

## 2024-11-15 NOTE — ANESTHESIA CARE TRANSFER NOTE
Patient: Shyann Jurado    Procedure: Procedure(s):  COSMETIC BILATERAL ENDOSCOPIC BROWLIFT,  BILATERAL FACELIFT, BILATERAL NECKLIFT  FAT TRANSFER FROM TRUNK TO FACE  AND FRAXEL CARBON DIOXIDE LASER TO THE FACE       Diagnosis: Facial aging [R23.8]  Diagnosis Additional Information: No value filed.    Anesthesia Type:   General     Note:    Oropharynx: oropharynx clear of all foreign objects and spontaneously breathing  Level of Consciousness: awake and drowsy  Oxygen Supplementation: nasal cannula  Level of Supplemental Oxygen (L/min / FiO2): 2  Independent Airway: airway patency satisfactory and stable  Dentition: dentition unchanged  Vital Signs Stable: post-procedure vital signs reviewed and stable  Report to RN Given: handoff report given  Patient transferred to: PACU    Handoff Report: Identifed the Patient, Identified the Reponsible Provider, Reviewed the pertinent medical history, Discussed the surgical course, Reviewed Intra-OP anesthesia mangement and issues during anesthesia, Set expectations for post-procedure period and Allowed opportunity for questions and acknowledgement of understanding      Vitals:  Vitals Value Taken Time   /84 11/14/24 1924   Temp     Pulse 103 11/14/24 1927   Resp 14 11/14/24 1927   SpO2 100 % 11/14/24 1927   Vitals shown include unfiled device data.    Electronically Signed By: MARINA Zuniga CRNA  November 14, 2024  7:27 PM

## 2024-11-15 NOTE — OP NOTE
Preoperative diagnosis: Facial aging    Postoperative diagnosis: Same    Procedures: Fat transfer to the face with harvest from the trunk    Endoscopic brow lift (revision)    Neck lift with partial excision of the submandibular glands, partial excision of hyper atrophic digastric muscles, and removal midline intra digastric fat.  Platysma plication with opening of the hyoid fascia.    Bilateral deep plane facelift with platysmal tightening in the with mastoid crevasse technique.    Fractional laser carbon dioxide facial skin resurfacing of the central face, forearms and hands    Anesthesia: General With a laser tube    Primary Surgeon: Dr. Aj Smallwood    Estimated blood loss: 30 cc    Drains: 2-10 Azeri round postauricular drains    Implants: Two 3.5 mm Endo kenney forehead fixation devices    Indications for procedure: This is a 49-year-old female with facial aging who has noticed facial relaxation extending into the neck.  The patient had a previous upper blepharoplasty and brow lift.  At this time the brows were still low from the brow lift and it was recommended that her revision of her brow lift in the lateral compartment would be indicated.  The risks benefits serious is a significant complications of the above-named procedures were discussed with the patient in the presurgery consultation and informed consent documents were signed.  Today in the presurgical area preoperative markings were done for the 4 forehead incisions, the deep plane facelift, and proposed areas of fat transfer.    Operative procedure: The patient was taken to the operating room placed supine on the operating table and induced under general endotracheal anesthesia with a laser tube.  Following this the patient's abdomen and thighs were then prepped and draped in the usual fashion and approximately 50 cc of fat was harvested from the abdomen and 20 cc was harvested from the inner thighs.  The fat was processed with centrifugation and  then the patient was then prepped and draped from the head to below the collarbones.    Small cannulas were used to place the fat in the following areas: Temple region supraorbital rim, central and lateral cheek, upper and lower lip, nasolabial folds, prejowl sulcus and chin, and the earlobes.  The remaining fat was then reduced to burke fat and approximately 10 cc of burke fat was injected with a 26 7 gauge needle into the upper and lower lip in addition to the previously placed fat.    Attention was then directed toward the brow lift where and 2 paramedian and 2 temple incisions were made.  The paramedian portal incisions were then dissected and the subperiosteal plane and it should be mentioned that the central portion of the forehead was not elevated in order to not over elevate the patient's brows because of her previous blepharoplasty which may cause problems with lid closure.  The in the temple the dissection was taken to the deep temporal fascia at which time the dissection was then carried from lateral to medial to join through the conjoined tendon to this subperiosteal dissection in the forehead.  Following this dissection was then done in the arcus marginalis lateral to the supraorbital rim and along the lateral orbital rim in order to free up just the lateral portion of the brow and this was elevated with two 3.5 mm Endo kenney forehead fixation devices through the paramedian incision.  After this was done the superficial temporal fascia was elevated on the deep temporal fascia and the temples and all the 4 incisions were closed with 5-0 nylon suture.    Attention was then directed toward the neck where the the patient had preoperative palpable ptotic submandibular glands.  Through a submental incision, a subcutaneous dissection was carried down to the level of the superior margin of the thyroid cartilage exposing the 2 heads of the platysma muscle.  The platysma muscle was sectioned in the midline and with  coagulation cautery a subplatysmal dissection was done to expose the digastric muscles which were hypertrophied.  This was carried down to the level of the digastric tendon at which time it could be seen that there was a ptotic submandibular gland on both sides small portion of the ptotic submandibular gland was removed with the LigaSure device bring it to the level of the hyoid.  Following this the LigaSure device was used to reduce the hypertrophic digastric muscles and also some of the intra digastric fat.  3-0 PDS were suture was then used to imbricate the 2 heads of the digastric muscle supplying a support to the anterior midline.    Following this, the platysma was closed from the hyoid to the menton after the fascia was released off the hyoid bone.following this a subcutaneous dissection was done to the deep plane entry point anteriorly and then posteriorly the skin was elevated off the posterior mastoid with careful preservation of the greater auricular nerve by maintaining the cervical fascia over the nerve at all times.  This dissection was carried into the neck approximately superior margin of the thyroid cartilage.      Attention was then directed toward the right side at which time a pretemporal tuft preauricular post tragal postauricular and continuing along the mastoid hairline incision was done.  A subcutaneous dissection was done in front of the ear to the deep plane entry point and behind the ear dissection was done in the subcutaneous plane above the mastoid fascia at all times so that the greater auricular nerve was protected.  Dissection was carried into the anterior neck under the mandibular margin to the level of the superior thyroid cartilage connected to the anterior midline dissection.  The deep plane entry point was entered the lower premasseteric fascia was preserved as the platysma was raised off the the parotid.  In the upper plane blunt dissection was done over the zygomatic muscle  complex up to the nasolabial fold.  A portion of the zygomatic ligaments were released and then attention was then directed toward the neck dissection where and the platysma was then elevated off of the parotid and the neck and carried along the anterior border of the sternocleidomastoid muscle protecting the greater auricular nerve.  A mastoid crevasse technique was then used to suspend the cervical platysma restoring the contour along the jawline.  Excess skin was removed, a 10 Croatian drain was then placed behind the ear, and the wounds were closed with 5-0 nylon suture.  In the postauricular sulcus the wound was closed with 5-0 gut suture and 3-0 gut suture.    A similar procedure was repeated on the opposite left side with similar findings.    The submental wound was then closed with a 4-0 Vicryl and 5-0 nylon suture.    Following this, the flexible laser was then brought into the surgical field all the surrounding flammable structures were covered with wet towels and only the central face was lasered with a fractional carbon dioxide laser with none of the underlying tissues being exposed.    Following this the forearm and hands on the right and left side were also treated with a Fraxel carbon dioxide laser skin resurfacing.  The laser eye shields were removed ophthalmic ointment was placed around the Kameron orbital region and the remaining resurfaced areas were sealed with dural Vaseline.  The patient was then awakened from anesthesia she tolerated the procedure well there were no complications.

## 2024-11-16 NOTE — PLAN OF CARE
Goal Outcome Evaluation:    Date & Time: 0741-5540   Surgery/POD#: POD 1 of a cosmetic bilateral endoscopic browlift, bilateral facelift and neck lift, fat transfer from trunk to face, and fraxel carbon  dioxide laser to the face  Behavior & Aggression: Green  Fall Risk: Yes  Orientation: A&O x 4  ABNL VS/O2: VSS on RA  Pain Management: PRN tYL #3 given x1  Bowel/Bladder: Passing gas, no BM this shift, up to BSC voided 350cc spontaneously  Drains: PIV SL  Wounds/incisions: Bilateral pelvic puncture sites, umbilicus lap sites, facial incisions-OSIRIS, no drainage, face swelling  Diet: Regular  Activity Level:SBAGB/W  Tests/Procedures: None this shift  Anticipated  DC Date: Today  Significant Information:Per pt only pain medication that works for her is Tyl#3, writer got a T.O order from  the surgeon Dr. Smallwood for one time dose which was given, surgeon came back later and after long talk with pt, plan is to discharge home when  gets here. Per surgeon, pt does not need any medication to go home with as she has all the meds she needs at home already.

## 2025-01-02 ENCOUNTER — PATIENT OUTREACH (OUTPATIENT)
Dept: CARE COORDINATION | Facility: CLINIC | Age: 50
End: 2025-01-02
Payer: COMMERCIAL

## 2025-01-16 ENCOUNTER — PATIENT OUTREACH (OUTPATIENT)
Dept: CARE COORDINATION | Facility: CLINIC | Age: 50
End: 2025-01-16
Payer: COMMERCIAL

## 2025-03-17 ENCOUNTER — PATIENT OUTREACH (OUTPATIENT)
Dept: CARE COORDINATION | Facility: CLINIC | Age: 50
End: 2025-03-17
Payer: COMMERCIAL

## 2025-03-17 DIAGNOSIS — F41.9 ANXIETY: ICD-10-CM

## 2025-03-17 DIAGNOSIS — Z30.011 INITIATION OF OCP (BCP): ICD-10-CM

## 2025-03-17 RX ORDER — DROSPIRENONE AND ETHINYL ESTRADIOL 0.03MG-3MG
KIT ORAL
Qty: 112 TABLET | Refills: 1 | Status: SHIPPED | OUTPATIENT
Start: 2025-03-17

## 2025-03-17 RX ORDER — ESCITALOPRAM OXALATE 10 MG/1
10 TABLET ORAL DAILY
Qty: 90 TABLET | Refills: 3 | Status: SHIPPED | OUTPATIENT
Start: 2025-03-17

## 2025-07-30 ENCOUNTER — PATIENT OUTREACH (OUTPATIENT)
Dept: CARE COORDINATION | Facility: CLINIC | Age: 50
End: 2025-07-30
Payer: COMMERCIAL

## (undated) DEVICE — SYR 20ML LL W/O NDL

## (undated) DEVICE — SU SILK 2-0 FS-1 18" 685G

## (undated) DEVICE — SU PROLENE 6-0 P-1 18" 8697G

## (undated) DEVICE — DRSG KERLIX 4 1/2"X4YDS ROLL 6715

## (undated) DEVICE — SOL WATER IRRIG 1000ML BOTTLE 2F7114

## (undated) DEVICE — DRSG KERLIX FLUFFS X5

## (undated) DEVICE — DECANTER VIAL 2006S

## (undated) DEVICE — SU VICRYL 4-0 PS-2 18" UND J496H

## (undated) DEVICE — TUBING SUCTION 12"X1/4" N612

## (undated) DEVICE — ESU CORD BIPOLAR 12' E0512

## (undated) DEVICE — SU PDS II 3-0 PS-2 18" Z497G

## (undated) DEVICE — ADH LIQUID MASTISOL TOPICAL VIAL 2-3ML 0523-48

## (undated) DEVICE — ESU PENCIL W/SMOKE EVAC CVPLP2000

## (undated) DEVICE — SU MONOCRYL 3-0 PS-2 27" Y427H

## (undated) DEVICE — SU SILK 2-0 TIE 24" SA75H

## (undated) DEVICE — STPL SKIN 35W 6.9MM  PXW35

## (undated) DEVICE — DRAPE LAP W/ARMBOARD 29410

## (undated) DEVICE — DRAPE POUCH IRR 1016

## (undated) DEVICE — LINEN TOWEL PACK X5 5464

## (undated) DEVICE — TUBING SUCTION MEDI-VAC SOFT 3/16"X20' N520A

## (undated) DEVICE — SU MONOCRYL 5-0 P-3 18" UND Y493G

## (undated) DEVICE — SPONGE RAY-TEC 4X4" 7317

## (undated) DEVICE — DRSG TEGADERM 2 3/8X2 3/4" 1624W

## (undated) DEVICE — ESU PENCIL W/HOLSTER E2350H

## (undated) DEVICE — COVER CAMERA ENDOVIDEO

## (undated) DEVICE — SU VICRYL 2-0 FS-1 27" UND  J443H

## (undated) DEVICE — SU MONOCRYL 4-0 PS-2 18" UND Y496G

## (undated) DEVICE — TUBING IV EXTENSION SET 34"

## (undated) DEVICE — SU SILK 6-0 G-1DA 18" 780G

## (undated) DEVICE — APPLICATOR COTTON TIP 6"X2 STERILE LF 6012

## (undated) DEVICE — GOWN IMPERVIOUS ZONED LG

## (undated) DEVICE — GLOVE BIOGEL PI MICRO SZ 8.0 48580

## (undated) DEVICE — ESU GROUND PAD UNIVERSAL W/O CORD

## (undated) DEVICE — SOL NACL 0.9% IRRIG 1000ML BOTTLE 2F7124

## (undated) DEVICE — SU PLAIN 6-0 G-1DA 18" 770G

## (undated) DEVICE — DRAIN JACKSON PRATT RESERVOIR 100ML SU130-1305

## (undated) DEVICE — Device

## (undated) DEVICE — NDL ANGIOCATH 18GA 1.25" 4055

## (undated) DEVICE — GLOVE PROTEXIS MICRO 6.5  2D73PM65

## (undated) DEVICE — DRAIN JACKSON PRATT 10FR ROUND SU130-1321

## (undated) DEVICE — DRSG STERI STRIP 1/2X4" R1547

## (undated) DEVICE — PACK OCULOPLATIC SEN15OCFSD

## (undated) DEVICE — SU CHROMIC 6-0 G-1 18" 790G

## (undated) DEVICE — SYR 10ML LL W/O NDL 302995

## (undated) DEVICE — DECANTER BAG 2002S

## (undated) DEVICE — DRAPE SPLIT EENT 76X124" 3X28" 9447

## (undated) DEVICE — ESU LIGASURE OPEN SEALER/DIVIDER SM JAW 16.5MM LF1212A

## (undated) DEVICE — PEN MARKING SKIN FINE 31145942

## (undated) DEVICE — PREP SKIN SCRUB TRAY 4461A

## (undated) DEVICE — NEEDLE HYPO MONOJECT STANDARD 22GA 1 1/2IN BLUE 1188822112

## (undated) DEVICE — SU PLAIN 3-0 X-1 18" 612G

## (undated) DEVICE — ESU NDL COLORADO MICRO 3CM STR N103A

## (undated) DEVICE — SU PROLENE 5-0 PS-3 18" 8681G

## (undated) DEVICE — EYE PREP BETADINE 5% SOLUTION 30ML 0065-0411-30

## (undated) DEVICE — DRSG TELFA 3X8" 1238

## (undated) DEVICE — DRAPE MAYO STAND 23X54 8337

## (undated) DEVICE — DRAPE STERI TOWEL LG 1010

## (undated) DEVICE — ESU ELEC BLADE 6" COATED/INSULATED E1455-6

## (undated) DEVICE — SU SILK 4-0 G-3DA 18" 783G

## (undated) DEVICE — NDL COUNTER 40CT  31142311

## (undated) DEVICE — LIGHT HANDLE X2

## (undated) DEVICE — SU ETHILON 5-0 PS-2 18" 1666G

## (undated) DEVICE — SU VICRYL 4-0 P-3 18" UND  J494H

## (undated) RX ORDER — ONDANSETRON 2 MG/ML
INJECTION INTRAMUSCULAR; INTRAVENOUS
Status: DISPENSED
Start: 2024-11-14

## (undated) RX ORDER — PROPOFOL 10 MG/ML
INJECTION, EMULSION INTRAVENOUS
Status: DISPENSED
Start: 2021-03-02

## (undated) RX ORDER — EPINEPHRINE 1 MG/ML
INJECTION, SOLUTION INTRAMUSCULAR; SUBCUTANEOUS
Status: DISPENSED
Start: 2024-11-14

## (undated) RX ORDER — APREPITANT 40 MG/1
CAPSULE ORAL
Status: DISPENSED
Start: 2024-11-14

## (undated) RX ORDER — PROPOFOL 10 MG/ML
INJECTION, EMULSION INTRAVENOUS
Status: DISPENSED
Start: 2024-11-14

## (undated) RX ORDER — ONDANSETRON 2 MG/ML
INJECTION INTRAMUSCULAR; INTRAVENOUS
Status: DISPENSED
Start: 2021-03-02

## (undated) RX ORDER — LIDOCAINE HYDROCHLORIDE AND EPINEPHRINE 10; 10 MG/ML; UG/ML
INJECTION, SOLUTION INFILTRATION; PERINEURAL
Status: DISPENSED
Start: 2024-11-14

## (undated) RX ORDER — FENTANYL CITRATE 0.05 MG/ML
INJECTION, SOLUTION INTRAMUSCULAR; INTRAVENOUS
Status: DISPENSED
Start: 2024-11-14

## (undated) RX ORDER — HYDROMORPHONE HYDROCHLORIDE 1 MG/ML
INJECTION, SOLUTION INTRAMUSCULAR; INTRAVENOUS; SUBCUTANEOUS
Status: DISPENSED
Start: 2024-11-14

## (undated) RX ORDER — FENTANYL CITRATE 50 UG/ML
INJECTION, SOLUTION INTRAMUSCULAR; INTRAVENOUS
Status: DISPENSED
Start: 2024-11-14

## (undated) RX ORDER — GINSENG 100 MG
CAPSULE ORAL
Status: DISPENSED
Start: 2024-11-14

## (undated) RX ORDER — FENTANYL CITRATE 0.05 MG/ML
INJECTION, SOLUTION INTRAMUSCULAR; INTRAVENOUS
Status: DISPENSED
Start: 2021-03-02

## (undated) RX ORDER — DEXAMETHASONE SODIUM PHOSPHATE 4 MG/ML
INJECTION, SOLUTION INTRA-ARTICULAR; INTRALESIONAL; INTRAMUSCULAR; INTRAVENOUS; SOFT TISSUE
Status: DISPENSED
Start: 2024-11-14

## (undated) RX ORDER — LIDOCAINE HYDROCHLORIDE 20 MG/ML
INJECTION, SOLUTION EPIDURAL; INFILTRATION; INTRACAUDAL; PERINEURAL
Status: DISPENSED
Start: 2021-03-02